# Patient Record
Sex: MALE | Race: WHITE | NOT HISPANIC OR LATINO | Employment: OTHER | URBAN - METROPOLITAN AREA
[De-identification: names, ages, dates, MRNs, and addresses within clinical notes are randomized per-mention and may not be internally consistent; named-entity substitution may affect disease eponyms.]

---

## 2017-01-05 DIAGNOSIS — J45.20 INTERMITTENT ASTHMA, UNCOMPLICATED: Primary | ICD-10-CM

## 2017-01-05 NOTE — TELEPHONE ENCOUNTER
mometasone (ASMANEX 30 METERED DOSES) 220 MCG/INH inhaler       Last Written Prescription Date: 11-23-15  Last Fill Quantity: 3, # refills: 3    Last Office Visit with G, BRAULIO or [facility] prescribing provider:  8-26-16   Future Office Visit:       Date of Last Asthma Action Plan Letter:   Asthma Action Plan Q1 Year    Topic Date Due     Asthma Action Plan - yearly  11/23/2016      Asthma Control Test:   ACT Total Scores 8/26/2016   ACT TOTAL SCORE -   ASTHMA ER VISITS -   ASTHMA HOSPITALIZATIONS -   ACT TOTAL SCORE (Goal Greater than or Equal to 20) 25   In the past 12 months, how many times did you visit the emergency room for your asthma without being admitted to the hospital? 0   In the past 12 months, how many times were you hospitalized overnight because of your asthma? 0       Date of Last Spirometry Test:   No results found for this or any previous visit.

## 2017-02-01 ENCOUNTER — OFFICE VISIT (OUTPATIENT)
Dept: OTOLARYNGOLOGY | Facility: CLINIC | Age: 57
End: 2017-02-01

## 2017-02-01 VITALS
BODY MASS INDEX: 24.64 KG/M2 | WEIGHT: 157 LBS | DIASTOLIC BLOOD PRESSURE: 81 MMHG | HEART RATE: 75 BPM | HEIGHT: 67 IN | OXYGEN SATURATION: 98 % | SYSTOLIC BLOOD PRESSURE: 132 MMHG

## 2017-02-01 DIAGNOSIS — C09.0 MALIGNANT NEOPLASM OF TONSILLAR FOSSA (H): Primary | ICD-10-CM

## 2017-02-01 ASSESSMENT — PAIN SCALES - GENERAL: PAINLEVEL: NO PAIN (0)

## 2017-02-01 NOTE — Clinical Note
2/1/2017        RE: Jose Wise  [address] [id number]     Dear Colleague,    Thank you for referring your patient, Jose Wise, to the [facility] EAR [facility]ROAT at [facility]. Please see a copy of my visit note below.    Dear Dr. Alvarez:    I had the pleasure of seeing Jose Wise in follow-up today at the [facility].     History of Present Illness:   Mr Wise is a 56 year old man with a history of a [id number] SCC of the right tonsil. He was treated with concurrent chemoradiation, completed 3/14/2014. His post-treatment PET showed complete treatment response. He was seen by Dr Romo on 11/8/2016. His TSH was checked in November and was normal. He had a chest and neck CT at that time as well which did not show any evidence of locoregional recurrence. There were several small pulmonary nodules present which will be observed, with plans for repeat imaging in November 2017. I last saw him 11/2016 at which time he was doing well. He comes in today for follow-up. He has stable dry mouth. He is eating well and able to maintain his weight. He has no speaking or swallowing concerns. He has no new neck masses or ear pain. He has stable tinnitus and does have some occasional lightheadedness which he describes as being similar to getting up too quickly. He has a cyst in his right jaw that he had aspirated in August 2016 with interval resolution that is again reaccumulating. He is not smoking.       MEDICATIONS:     Current Outpatient Prescriptions   Medication Sig Dispense Refill     mometasone (ASMANEX 30 METERED DOSES) 220 MCG/INH Inhaler Inhale 1 puff into the lungs daily 3 Inhaler 3     clobetasol (TEMOVATE) 0.05 % external solution Apply topically 2 times daily 50 mL 3     FLUOCINOLONE ACETONIDE SCALP 0.01 % OIL Externally apply 1 Application topically daily 118 mL 3     ketoconazole (NIZORAL) 2 % cream Apply  topically daily To sides of nose and eyebrows for seborrheic dermatitis of the face. 30 g 2     Salicylic Acid 6 % SHAM        ketoconazole (NIZORAL) 2 % shampoo Apply topically three times a week Alternate with H&S/Selsun Blue, and salicylic acid 120 mL 3     metroNIDAZOLE (METROCREAM) 0.75 % cream Apply topically 2 times daily as needed 45 g 11     albuterol (VENTOLIN HFA) 108 (90 BASE) MCG/ACT inhaler Inhale 2 puffs into the lungs 4 times daily as needed 1 Inhaler 11     calcium carbonate (OS-KAVYA 500 MG [place]. [place]) 500 MG tablet Take by mouth 2 times daily       Glucosamine-Chondroitin (GLUCOSAMINE CHONDR COMPLEX PO) Take 1,000 mg by mouth       Omega-3 Fatty Acids (FISH OIL) 1200 MG CAPS        Multiple Vitamin (MULTI-VITAMIN PO) Take  by mouth. Occasionally           ALLERGIES:    Allergies   Allergen Reactions     Animal Dander Itching     Pet hair causes watery eyes and sneezing     Grass Itching     Red tipped grasses, sneezing and watery eyes       HABITS/SOCIAL HISTORY:     Quit smoking years ago    PAST MEDICAL HISTORY:   Past Medical History   Diagnosis Date     Substance abuse      POLYSUBSTANCE     Asthma      Contact with or exposure to other communicable diseases      Spasm of muscle      Lateral epicondylitis  of elbow      Pain in joint, upper arm      Unspecified asthma(493.90)      takes daily inhaler, never been intubated, no attacks  since 2012     Pure hypercholesterolemia      Impaired fasting glucose 9/4/2012     Tension headache, chronic 10/17/2012     Tinea versicolor 12/5/2012     Rosacea 12/5/2012     Impacted cerumen 10/14/2013     Seasonal allergies      Tonsillar cancer (H) 2013     chemo and radiation     History of radiation therapy      Chemical dependency (H)      Hoarseness      Tinnitus         FAMILY HISTORY:    Family History   Problem Relation Age of Onset     GASTROINTESTINAL DISEASE Son      crohn's disease/ colostomy     Circulatory Mother 45     cerebral  "aneurysm     Alcohol/Drug Mother      sibling     Other - See Comments Mother      aneurysm     Substance Abuse Mother      CANCER Mother      Cardiovascular Father      Heart Attack     Coronary Artery Disease Father      heart attack/ triple bypass     CANCER Father      skin cancer     Skin Cancer Father      Other Cancer Brother      upper thorax     Substance Abuse Brother      CANCER Sister      CANCER Brother      Melanoma No family hx of        REVIEW OF SYSTEMS:  12 point ROS was negative other than the symptoms noted above in the HPI.  Patient Supplied Answers to Review of Systems  [facility] ROS 2/1/2017   Constitutional -   Neurology Dizzy spells, Headache   Ears, Nose, Throat Ringing/noise in ears, Nasal congestion or drainage, Trouble swallowing   Gastrointestinal/Genitourinary -   Musculoskeletal -   Allergy/Immunology -   Endocrine -         PHYSICAL EXAMINATION:   /81 mmHg  Pulse 75  Ht 1.7 m (5' 6.93\")  Wt 71.215 kg (157 lb)  BMI 24.64 kg/m2  SpO2 98%  Appearance:   normal; NAD, age-appropriate appearance, well-developed, normal habitus   Communication:   normal; communicates verbally, normal voice quality   Head/Face:   inspection -  Normal; no scars or visible lesions   Salivary glands -  Normal size, no tenderness, swelling, or palpable masses   Facial strength -  Normal and symmetric bilateral; H/B I/VI   Skin:  normal, no rash   Ocular Motility:  normal occular movements   Ears:  auricle (AD) -  normal  EAC (AD) -  normal  TM (AD) -  Normal, no effusion  auricle (AS) -  normal  EAC (AS) -  normal  TM (AS) -  Normal, no effusion  Normal clinical speech reception   Nose:  Ext. inspection -  Normal  Internal Inspection -  Normal mucosa, septum, and turbinates   Nasopharynx:  normal mucosa, no masses   Oral Cavity:  lips -  Normal mucosa, oral competence, and stoma size   Age-appropriate dentition, healthy gingival mucosa   Hard palate, buccal, floor of mouth mucosa normal   Tongue - normal " movement, no lesions, normal sensation, no palpable BOT masses   Oropharynx:  mucosa -  Normal, no lesions  soft palate -  Normal, no lesions, no asymmetry, normal elevation  No palpable masses in BOT or tonsillar fossa   Hypopharynx:  Normal pyriform sinus and pharyngeal wall mucosa   No pooled secretions    Larynx:  Epiglottis, false vocal cord, true vocal cord normal in appearance, bilaterally mobile cords    Neck: No visible mass or asymmetry   thyroid -  Normal   Normal range of motion   Lymphatic:  no abnormal nodes   Cardiovascular: warm, pink, well-perfused extremities without swelling, tenderness, or edema   Respiratory: Normal respiratory effort, no stridor   Neuro/Psych.:  mood/affect -  normal  mental status -  normal  cranial nerves -  normal        PROCEDURES:   Flexible fiberoptic laryngoscopy: Verbal consent was obtained. The nasal cavity was prepped with an aerosolized solution of topical anesthetic and vasoconstrictive agent. The scope was passed through the anterior nasal cavity and advanced. Inspection of the nasopharynx revealed no gross abnormality. Inspection of the larynx revealed bilaterally mobile vocal cords. Pyriform sinuses are symmetric. No intra-arytenoid irregularities noted. The epiglottis, aryepiglottic folds, vallecula and base of tongue are unremarkable. There are radiation changes to the mucosa. The airway is patent. Procedure tolerated well with no immediate complications noted.    IMPRESSION AND PLAN:   Mr Wise has a history of oropharyngeal cancer, almost 2 years from his treatment. He has no evidence of recurrence at this time. We will plan on repeat imaging in a year with his small pulmonary nodules. I offered a hearing test for him today due to the tinnitus but he says that he tests it himself at work. I will see him back for routine follow-up in 3 months.     Thank you very much for the opportunity to participate in the care of your patient.      Yazmin Bundy,  M.D.  Otolaryngology- Head & Neck Surgery      CC:  Janes Romo   of Medicine  Division of Hematology, Oncology, and Transplantation    Valery Carreon MD  Department of Radiation Oncology  [facility]        Again, thank you for allowing me to participate in the care of your patient.      Sincerely,    Yazmin Bundy MD

## 2017-02-02 NOTE — PROGRESS NOTES
Dear Dr. Alvarez:    I had the pleasure of seeing Jose Wise in follow-up today at the [facility].     History of Present Illness:   Mr Wise is a 56 year old man with a history of a [id number] SCC of the right tonsil. He was treated with concurrent chemoradiation, completed 3/14/2014. His post-treatment PET showed complete treatment response. He was seen by Dr Romo on 11/8/2016. His TSH was checked in November and was normal. He had a chest and neck CT at that time as well which did not show any evidence of locoregional recurrence. There were several small pulmonary nodules present which will be observed, with plans for repeat imaging in November 2017. I last saw him 11/2016 at which time he was doing well. He comes in today for follow-up. He has stable dry mouth. He is eating well and able to maintain his weight. He has no speaking or swallowing concerns. He has no new neck masses or ear pain. He has stable tinnitus and does have some occasional lightheadedness which he describes as being similar to getting up too quickly. He has a cyst in his right jaw that he had aspirated in August 2016 with interval resolution that is again reaccumulating. He is not smoking.       MEDICATIONS:     Current Outpatient Prescriptions   Medication Sig Dispense Refill     mometasone (ASMANEX 30 METERED DOSES) 220 MCG/INH Inhaler Inhale 1 puff into the lungs daily 3 Inhaler 3     clobetasol (TEMOVATE) 0.05 % external solution Apply topically 2 times daily 50 mL 3     FLUOCINOLONE ACETONIDE SCALP 0.01 % OIL Externally apply 1 Application topically daily 118 mL 3     ketoconazole (NIZORAL) 2 % cream Apply topically daily To sides of nose and eyebrows for seborrheic dermatitis of the face. 30 g 2     Salicylic Acid 6 % SHAM        ketoconazole (NIZORAL) 2 % shampoo Apply topically three times a week Alternate with H&S/Selsun Blue, and salicylic acid 120 mL 3     metroNIDAZOLE (METROCREAM) 0.75 %  cream Apply topically 2 times daily as needed 45 g 11     albuterol (VENTOLIN HFA) 108 (90 BASE) MCG/ACT inhaler Inhale 2 puffs into the lungs 4 times daily as needed 1 Inhaler 11     calcium carbonate (OS-KAVYA 500 MG [place]. [place]) 500 MG tablet Take by mouth 2 times daily       Glucosamine-Chondroitin (GLUCOSAMINE CHONDR COMPLEX PO) Take 1,000 mg by mouth       Omega-3 Fatty Acids (FISH OIL) 1200 MG CAPS        Multiple Vitamin (MULTI-VITAMIN PO) Take  by mouth. Occasionally           ALLERGIES:    Allergies   Allergen Reactions     Animal Dander Itching     Pet hair causes watery eyes and sneezing     Grass Itching     Red tipped grasses, sneezing and watery eyes       HABITS/SOCIAL HISTORY:     Quit smoking years ago    PAST MEDICAL HISTORY:   Past Medical History   Diagnosis Date     Substance abuse      POLYSUBSTANCE     Asthma      Contact with or exposure to other communicable diseases      Spasm of muscle      Lateral epicondylitis  of elbow      Pain in joint, upper arm      Unspecified asthma(493.90)      takes daily inhaler, never been intubated, no attacks  since 2012     Pure hypercholesterolemia      Impaired fasting glucose 9/4/2012     Tension headache, chronic 10/17/2012     Tinea versicolor 12/5/2012     Rosacea 12/5/2012     Impacted cerumen 10/14/2013     Seasonal allergies      Tonsillar cancer (H) 2013     chemo and radiation     History of radiation therapy      Chemical dependency (H)      Hoarseness      Tinnitus         FAMILY HISTORY:    Family History   Problem Relation Age of Onset     GASTROINTESTINAL DISEASE Son      crohn's disease/ colostomy     Circulatory Mother 45     cerebral aneurysm     Alcohol/Drug Mother      sibling     Other - See Comments Mother      aneurysm     Substance Abuse Mother      CANCER Mother      Cardiovascular Father      Heart Attack     Coronary Artery Disease Father      heart attack/ triple bypass     CANCER Father      skin cancer     Skin  "Cancer Father      Other Cancer Brother      upper thorax     Substance Abuse Brother      CANCER Sister      CANCER Brother      Melanoma No family hx of        REVIEW OF SYSTEMS:  12 point ROS was negative other than the symptoms noted above in the HPI.  Patient Supplied Answers to Review of Systems  [facility] ROS 2/1/2017   Constitutional -   Neurology Dizzy spells, Headache   Ears, Nose, Throat Ringing/noise in ears, Nasal congestion or drainage, Trouble swallowing   Gastrointestinal/Genitourinary -   Musculoskeletal -   Allergy/Immunology -   Endocrine -         PHYSICAL EXAMINATION:   /81 mmHg  Pulse 75  Ht 1.7 m (5' 6.93\")  Wt 71.215 kg (157 lb)  BMI 24.64 kg/m2  SpO2 98%  Appearance:   normal; NAD, age-appropriate appearance, well-developed, normal habitus   Communication:   normal; communicates verbally, normal voice quality   Head/Face:   inspection -  Normal; no scars or visible lesions   Salivary glands -  Normal size, no tenderness, swelling, or palpable masses   Facial strength -  Normal and symmetric bilateral; H/B I/VI   Skin:  normal, no rash   Ocular Motility:  normal occular movements   Ears:  auricle (AD) -  normal  EAC (AD) -  normal  TM (AD) -  Normal, no effusion  auricle (AS) -  normal  EAC (AS) -  normal  TM (AS) -  Normal, no effusion  Normal clinical speech reception   Nose:  Ext. inspection -  Normal  Internal Inspection -  Normal mucosa, septum, and turbinates   Nasopharynx:  normal mucosa, no masses   Oral Cavity:  lips -  Normal mucosa, oral competence, and stoma size   Age-appropriate dentition, healthy gingival mucosa   Hard palate, buccal, floor of mouth mucosa normal   Tongue - normal movement, no lesions, normal sensation, no palpable BOT masses   Oropharynx:  mucosa -  Normal, no lesions  soft palate -  Normal, no lesions, no asymmetry, normal elevation  No palpable masses in BOT or tonsillar fossa   Hypopharynx:  Normal pyriform sinus and pharyngeal wall mucosa   No " pooled secretions    Larynx:  Epiglottis, false vocal cord, true vocal cord normal in appearance, bilaterally mobile cords    Neck: No visible mass or asymmetry   thyroid -  Normal   Normal range of motion   Lymphatic:  no abnormal nodes   Cardiovascular: warm, pink, well-perfused extremities without swelling, tenderness, or edema   Respiratory: Normal respiratory effort, no stridor   Neuro/Psych.:  mood/affect -  normal  mental status -  normal  cranial nerves -  normal        PROCEDURES:   Flexible fiberoptic laryngoscopy: Verbal consent was obtained. The nasal cavity was prepped with an aerosolized solution of topical anesthetic and vasoconstrictive agent. The scope was passed through the anterior nasal cavity and advanced. Inspection of the nasopharynx revealed no gross abnormality. Inspection of the larynx revealed bilaterally mobile vocal cords. Pyriform sinuses are symmetric. No intra-arytenoid irregularities noted. The epiglottis, aryepiglottic folds, vallecula and base of tongue are unremarkable. There are radiation changes to the mucosa. The airway is patent. Procedure tolerated well with no immediate complications noted.    IMPRESSION AND PLAN:   Mr Wise has a history of oropharyngeal cancer, almost 2 years from his treatment. He has no evidence of recurrence at this time. We will plan on repeat imaging in a year with his small pulmonary nodules. I offered a hearing test for him today due to the tinnitus but he says that he tests it himself at work. I will see him back for routine follow-up in 3 months.     Thank you very much for the opportunity to participate in the care of your patient.      Yazmin Bundy M.D.  Otolaryngology- Head & Neck Surgery      CC:  Janes Romo   of Medicine  Division of Hematology, Oncology, and Transplantation    Valery Carreon MD  Department of Radiation Oncology  [facility]

## 2017-03-02 ENCOUNTER — MYC MEDICAL ADVICE (OUTPATIENT)
Dept: FAMILY MEDICINE | Facility: CLINIC | Age: 57
End: 2017-03-02

## 2017-03-28 ENCOUNTER — OFFICE VISIT (OUTPATIENT)
Dept: DERMATOLOGY | Facility: CLINIC | Age: 57
End: 2017-03-28

## 2017-03-28 DIAGNOSIS — L71.9 ACNE ROSACEA: ICD-10-CM

## 2017-03-28 DIAGNOSIS — L40.8 SEBOPSORIASIS: Primary | ICD-10-CM

## 2017-03-28 DIAGNOSIS — I78.1 TELANGIECTASIA: ICD-10-CM

## 2017-03-28 ASSESSMENT — PAIN SCALES - GENERAL: PAINLEVEL: NO PAIN (0)

## 2017-03-28 NOTE — MR AVS SNAPSHOT
After Visit Summary   3/28/2017    Jose Wise    MRN: [id number]           Patient Information     Date Of Birth          1960        Visit Information        Provider Department      3/28/2017 4:30 PM Brandon Frausto MD [facility] Dermatology        Today's Diagnoses     Sebopsoriasis    -  1       Follow-ups after your visit        Follow-up notes from your care team     Return in about 9 months (around 12/28/2017).      Your next 10 appointments already scheduled     May 03, 2017  4:00 PM CDT   (Arrive by 3:45 PM)   Return Visit with Yazmin Bundy MD   [facility] Ear Nose and Throat ([facility])    [address]  4th Floor  [facility] [address]   [phone]            May 16, 2017  3:45 PM CDT   [facility] Draw with [facility] DRAW   [facility] Lab Draw ([facility])    [address] [place] [address]  [address] [facility] [address]   [phone]            May 16, 2017  4:20 PM CDT   (Arrive by 4:05 PM)   Return Visit with KENTON Mak   [facility] ([facility])    [address] [place] [address]  [address] [facility] [address]   [phone]            Nov 14, 2017 12:00 PM CST   (Arrive by 11:45 AM)   CT CHEST W CONTRAST with UCCT1   [facility] Imaging [place] [facility] ([facility])    [address] [place] [address]  1st Floor  [facility] [address]   [phone]           Please bring any scans or X-rays taken at other hospitals, if similar tests were done. Also bring a list of your medicines, including vitamins, minerals and over-the-counter drugs. It is safest to leave personal items at home.  Be sure to tell your doctor:   If you have any allergies.   If there s any chance you are pregnant.   If you are breastfeeding.   If you have any special needs.  You will have contrast for this exam. To prepare:   Do not eat or drink for 2 hours before your  exam. If you need to take medicine, you may take it with small sips of water. (We may ask you to take liquid medicine as well.)   The day before your exam, drink extra fluids at least six 8-ounce glasses (unless your doctor tells you to restrict your fluids).  Patients over 70 or patients with diabetes or kidney problems:   If you haven t had a blood test (creatinine test) within the last 30 days, go to your clinic or Diagnostic Imaging Department for this test.  If you have diabetes:   If your kidney function is normal, continue taking your metformin (Avandamet, Glucophage, Glucovance, Metaglip) on the day of your exam.   If your kidney function is abnormal, wait 48 hours before restarting this medicine.  Please wear loose clothing, such as a sweat suit or jogging clothes. Avoid snaps, zippers and other metal. We may ask you to undress and put on a hospital gown.  If you have any questions, please call the Imaging Department where you will have your exam.            Nov 14, 2017 12:20 PM CST   (Arrive by 12:05 PM)   CT SOFT TISSUE NECK W CONTRAST with UCCT1   [facility] Imaging Center CT ([facility])    [address] [address] [phone]           Please bring any scans or X-rays taken at other hospitals, if similar tests were done. Also bring a list of your medicines, including vitamins, minerals and over-the-counter drugs. It is safest to leave personal items at home.  Be sure to tell your doctor:   If you have any allergies.   If there s any chance you are pregnant.   If you are breastfeeding.   If you have any special needs.  You will have contrast for this exam. To prepare:   Do not eat or drink for 2 hours before your exam. If you need to take medicine, you may take it with small sips of water. (We may ask you to take liquid medicine as well.)   The day before your exam, drink extra fluids at least six 8-ounce glasses (unless your doctor tells you  to restrict your fluids).  Patients over 70 or patients with diabetes or kidney problems:   If you haven t had a blood test (creatinine test) within the last 30 days, go to your clinic or Diagnostic Imaging Department for this test.  If you have diabetes:   If your kidney function is normal, continue taking your metformin (Avandamet, Glucophage, Glucovance, Metaglip) on the day of your exam.   If your kidney function is abnormal, wait 48 hours before restarting this medicine.  Please wear loose clothing, such as a sweat suit or jogging clothes. Avoid snaps, zippers and other metal. We may ask you to undress and put on a hospital gown.  If you have any questions, please call the Imaging Department where you will have your exam.            Nov 14, 2017  3:00 PM CST   (Arrive by 2:45 PM)   Return Visit with Janes Romo DO   [facility] ([facility])    [address] [place] [address] [phone]              Who to contact     Please call your clinic at [phone] to:    Ask questions about your health    Make or cancel appointments    Discuss your medicines    Learn about your test results    Speak to your doctor   If you have compliments or concerns about an experience at your clinic, or if you wish to file a complaint, please contact [facility] Patient Relations at [phone] or email us at [email or web address]         Additional Information About Your Visit        [employer] Information     [employer] gives you secure access to your electronic health record. If you see a primary care provider, you can also send messages to your care team and make appointments. If you have questions, please call your primary care clinic.  If you do not have a primary care provider, please call [phone] and they will assist you.      [employer] is an electronic gateway that provides easy, online access to your  medical records. With [employer], you can request a clinic appointment, read your test results, renew a prescription or communicate with your care team.     To access your existing account, please contact your [facility] or call [phone] for assistance.        Care EveryWhere ID     This is your Care EveryWhere ID. This could be used by other organizations to access your [facility] medical records  [id number]         Blood Pressure from Last 3 Encounters:   No data found for BP    Weight from Last 3 Encounters:   No data found for Wt              Today, you had the following     No orders found for display         Today's Medication Changes          These changes are accurate as of: 3/28/17 11:59 PM.  If you have any questions, ask your nurse or doctor.               Stop taking these medicines if you haven't already. Please contact your care team if you have questions.     FLUOCINOLONE ACETONIDE SCALP 0.01 % Oil oil   Stopped by:  Brandon Frausto MD           Salicylic Acid 6 % Sham   Stopped by:  Brandon Frausto MD                    Primary Care Provider Office Phone # Fax #    Nicolás Morejon [phone]       [address] [address]        Thank you!     Thank you for choosing [facility] DERMATOLOGY  for your care. Our goal is always to provide you with excellent care. Hearing back from our patients is one way we can continue to improve our services. Please take a few minutes to complete the written survey that you may receive in the mail after your visit with us. Thank you!             Your Updated Medication List - Protect others around you: Learn how to safely use, store and throw away your medicines at www.disposemymeds.org.          This list is accurate as of: 3/28/17 11:59 PM.  Always use your most recent med list.                   Brand Name Dispense Instructions for use    albuterol 108 (90 BASE)  MCG/ACT Inhaler    VENTOLIN HFA    1 Inhaler    Inhale 2 puffs into the lungs 4 times daily as needed       calcium carbonate 500 MG tablet    OS-KAVYA 500 mg [place]. [place]     Take by mouth 2 times daily       clobetasol 0.05 % external solution    TEMOVATE    50 mL    Apply topically 2 times daily       Fish Oil 1200 MG Caps          GLUCOSAMINE CHONDR COMPLEX PO      Take 1,000 mg by mouth       * ketoconazole 2 % shampoo    NIZORAL    120 mL    Apply topically three times a week Alternate with H&S/Selsun Blue, and salicylic acid       * ketoconazole 2 % cream    NIZORAL    30 g    Apply topically daily To sides of nose and eyebrows for seborrheic dermatitis of the face.       metroNIDAZOLE 0.75 % cream    METROCREAM    45 g    Apply topically 2 times daily as needed       mometasone 220 MCG/INH Inhaler    ASMANEX 30 METERED DOSES    3 Inhaler    Inhale 1 puff into the lungs daily       MULTI-VITAMIN PO      Take  by mouth. Occasionally       * Notice:  This list has 2 medication(s) that are the same as other medications prescribed for you. Read the directions carefully, and ask your doctor or other care provider to review them with you.

## 2017-03-28 NOTE — PROGRESS NOTES
"[facility] Dermatology Note    Dermatology Problem List:  1. Sebopsoriasis   - s/p fluocinolone 0.01% oil  - alternate ketoconazole 2% shampoo, salicylic acid shampoo and head & shoulders shampoos, clobetasol 0.05% solution  2. Seborrheic dermatitis - ketoconazole 2% cream  3. Rosacea - metronidazole cream    CC:   Chief Complaint   Patient presents with     Derm Problem     Jose is here today for a 3 month follow up for his \"scalp and sebopsoriasis\" States it's \"better.\"       Date of Service: Mar 28, 2017    History of Present Illness:  Mr. Jose Wise is a 56 year old male who presents for a follow-up for seborrheic dermatitis, sebopsoriasis and rosacea. Today the patient reports that the scalp is doing great and it is feeling good. He does not use the oil on his scalp, but he is using the solution. He uses the solution when he remembers. He reports that there is a red spot on his nose which has not gone away and is still red. He also reports that awhile ago a site was drained on his right chin area and there still is a little \"bulge\" at the site, but this does not produce any symptoms. The patient reports no other lesions of concern at this time.     Otherwise, the patient reports no painful, bleeding, nonhealing, or pruritic lesions, and denies new or changing moles.    Past Medical History:   Patient Active Problem List   Diagnosis     Trigger finger, left long     CARDIOVASCULAR SCREENING; LDL GOAL LESS THAN 130     Impaired fasting glucose     Tension headache, chronic     Tinea versicolor     Rosacea     MALIG NEOPLASM TONSILLAR FOSSA     Counseling regarding advanced directives     Intermittent asthma, uncomplicated     Dermatitis, seborrheic     Past Medical History:   Diagnosis Date     Asthma      Chemical dependency (H)      Contact with or exposure to other communicable diseases      History of radiation therapy      Hoarseness      Impacted cerumen 10/14/2013     Impaired " fasting glucose 9/4/2012     Lateral epicondylitis  of elbow      Pain in joint, upper arm      Pure hypercholesterolemia      Rosacea 12/5/2012     Seasonal allergies      Spasm of muscle      Substance abuse     POLYSUBSTANCE     Tension headache, chronic 10/17/2012     Tinea versicolor 12/5/2012     Tinnitus      Tonsillar cancer (H) 2013    chemo and radiation     Unspecified asthma(493.90)     takes daily inhaler, never been intubated, no attacks  since 2012     Past Surgical History:   Procedure Laterality Date     ABDOMEN SURGERY  january 2014    peg and port     BIOPSY  October 2013    fine needle aspiration in my neck     C HAND/FINGER SURGERY UNLISTED  12/13/10    Lt middle TF relese     COLONOSCOPY  1-17-11    Repeat 10 years     HERNIA REPAIR  1960    as an infant     INSERT PORT VASCULAR ACCESS  1/22/2014    Procedure: INSERT PORT VASCULAR ACCESS;  Insertion Vascular Port Access, Percutaneous Insertion Gastrostomy Tube ;  Surgeon: Adebayo Alston MD;  Location: [facility] OR     LARYNGOSCOPY, ESOPHAGOSCOPY,  BIOPSY, COMBINED  1/15/2014    Procedure: COMBINED LARYNGOSCOPY, ESOPHAGOSCOPY,  BIOPSY;  Direct Laryngoscopy, Esophagoscopy, Biopsies ;  Surgeon: Jayashree Alvarez MD;  Location: [facility] OR     ORTHOPEDIC SURGERY      fracture repair arm, shoulder     SOFT TISSUE SURGERY      cyst removal     Social History:  The patient is .    Family History:  There is a family history of melanoma in the patient's father.    Medications:  Current Outpatient Prescriptions   Medication Sig Dispense Refill     mometasone (ASMANEX 30 METERED DOSES) 220 MCG/INH Inhaler Inhale 1 puff into the lungs daily 3 Inhaler 3     clobetasol (TEMOVATE) 0.05 % external solution Apply topically 2 times daily 50 mL 3     FLUOCINOLONE ACETONIDE SCALP 0.01 % OIL Externally apply 1 Application topically daily 118 mL 3     ketoconazole (NIZORAL) 2 % cream Apply topically daily To sides of nose and eyebrows for seborrheic  dermatitis of the face. 30 g 2     Salicylic Acid 6 % SHAM        ketoconazole (NIZORAL) 2 % shampoo Apply topically three times a week Alternate with H&S/Selsun Blue, and salicylic acid 120 mL 3     metroNIDAZOLE (METROCREAM) 0.75 % cream Apply topically 2 times daily as needed 45 g 11     calcium carbonate (OS-KAVYA 500 MG [place]. [place]) 500 MG tablet Take by mouth 2 times daily       Glucosamine-Chondroitin (GLUCOSAMINE CHONDR COMPLEX PO) Take 1,000 mg by mouth       Omega-3 Fatty Acids (FISH OIL) 1200 MG CAPS        Multiple Vitamin (MULTI-VITAMIN PO) Take  by mouth. Occasionally         albuterol (VENTOLIN HFA) 108 (90 BASE) MCG/ACT inhaler Inhale 2 puffs into the lungs 4 times daily as needed (Patient not taking: Reported on 3/28/2017) 1 Inhaler 11     Allergies:  Allergies   Allergen Reactions     Animal Dander Itching     Pet hair causes watery eyes and sneezing     Grass Itching     Red tipped grasses, sneezing and watery eyes     Review of Systems:  - A six point ROS was negative except as noted in HPI.     Physical exam:  Vitals: There were no vitals taken for this visit.  GEN: This is a well-developed, well-nourished male in no acute distress, in a pleasant mood.      SKIN: Sun-exposed skin, which includes the head/face, neck, both arms, digits, and/or nails was examined.   - Light pinkness on temporal scalp remains  - Top of the scalp and occiput is healthy  - Interval resolution of pink patches on scalp  - Prominent telangiectasias on cheeks and nose  - No inflammatory papules, conjunctivitis  - Pink vascular patch on right nasal dorsum and left nasal ala, eliminated with diascopy   - Well healed scar from prior procedures on the right jaw line with ill defined firm subcutaneous nodule superolateral to the scar  - Superficial yellowish papules on inferior eyelid  - No other lesions of concern on areas examined.     Impression/Plan:  1. Sebopsoriasis  - Continue washing scalp daily, alternating between  ketoconazole 2% shampoo, salicylic acid shampoo and head & shoulders shampoos as needed. Stop salicylic acid shampoo first.   - Stop fluocinolone 0.01% oil - can restart this if needed.   - Continue clobetasol 0.05% solution - apply to scalp as needed  - Discussion to slowly decrease the use of the topical medications until the patient is using the least amount of medication to suppress the symptoms.     2. Seborrheic dermatitis - resolved  - Continue ketoconazole 2% cream - apply daily to eyebrows as needed    3. Rosacea  - Continue metronidazole cream - apply BID on the upper cheeks and eyebrows    4. Telangiectasias - nasal tip, left nasal ala  - No further intervention required. Patient to report changes.     5. Site of prior EIC injected with kenalog, unchanged to my examination and without clinical evidence of residual cyst  - No further intervention required. Patient to report changes.   - If this site produces any further symptoms, consider biopsy.     6. [place] spots - face  - No further intervention required. Patient to report changes.     Follow-up in 9 months, earlier for new or changing lesions.    Staff Involved:  Scribe Disclosure:   I, Ugo Wilkinson, am serving as a scribe to document services personally performed by Dr. Brandon Frausto, based on data collection and the provider's statements to me.     Staff attestation:  The documentation recorded by the scribe accurately reflects the services I personally performed and the decisions I personally made.    Brandon Frausto MD  Staff Dermatologist    Department of Dermatology

## 2017-03-28 NOTE — NURSING NOTE
"Dermatology Rooming Note    Jose Wise's goals for this visit include:   Chief Complaint   Patient presents with     Derm Problem     Jose is here today for a 3 month follow up for his \"scalp and sebopsoriasis\" States it's \"better.\"         Stephani Ayala, APPLE    "

## 2017-03-28 NOTE — LETTER
"3/28/2017       RE: Jose Wise  [address] [address]     Dear Colleague,    Thank you for referring your patient, Jose Wise, to the [facility] DERMATOLOGY at [facility]. Please see a copy of my visit note below.    [facility] Dermatology Note    Dermatology Problem List:  1. Sebopsoriasis   - s/p fluocinolone 0.01% oil  - alternate ketoconazole 2% shampoo, salicylic acid shampoo and head & shoulders shampoos, clobetasol 0.05% solution  2. Seborrheic dermatitis - ketoconazole 2% cream  3. Rosacea - metronidazole cream    CC:   Chief Complaint   Patient presents with     Derm Problem     Jose is here today for a 3 month follow up for his \"scalp and sebopsoriasis\" States it's \"better.\"       Date of Service: Mar 28, 2017    History of Present Illness:  Mr. Jose Wise is a 56 year old male who presents for a follow-up for seborrheic dermatitis, sebopsoriasis and rosacea. Today the patient reports that the scalp is doing great and it is feeling good. He does not use the oil on his scalp, but he is using the solution. He uses the solution when he remembers. He reports that there is a red spot on his nose which has not gone away and is still red. He also reports that awhile ago a site was drained on his right chin area and there still is a little \"bulge\" at the site, but this does not produce any symptoms. The patient reports no other lesions of concern at this time.     Otherwise, the patient reports no painful, bleeding, nonhealing, or pruritic lesions, and denies new or changing moles.    Past Medical History:   Patient Active Problem List   Diagnosis     Trigger finger, left long     CARDIOVASCULAR SCREENING; LDL GOAL LESS THAN 130     Impaired fasting glucose     Tension headache, chronic     Tinea versicolor     Rosacea     MALIG NEOPLASM TONSILLAR FOSSA     Counseling regarding advanced directives     Intermittent " asthma, uncomplicated     Dermatitis, seborrheic     Past Medical History:   Diagnosis Date     Asthma      Chemical dependency (H)      Contact with or exposure to other communicable diseases      History of radiation therapy      Hoarseness      Impacted cerumen 10/14/2013     Impaired fasting glucose 9/4/2012     Lateral epicondylitis  of elbow      Pain in joint, upper arm      Pure hypercholesterolemia      Rosacea 12/5/2012     Seasonal allergies      Spasm of muscle      Substance abuse     POLYSUBSTANCE     Tension headache, chronic 10/17/2012     Tinea versicolor 12/5/2012     Tinnitus      Tonsillar cancer (H) 2013    chemo and radiation     Unspecified asthma(493.90)     takes daily inhaler, never been intubated, no attacks  since 2012     Past Surgical History:   Procedure Laterality Date     ABDOMEN SURGERY  january 2014    peg and port     BIOPSY  October 2013    fine needle aspiration in my neck     C HAND/FINGER SURGERY UNLISTED  12/13/10    Lt middle TF relese     COLONOSCOPY  1-17-11    Repeat 10 years     HERNIA REPAIR  1960    as an infant     INSERT PORT VASCULAR ACCESS  1/22/2014    Procedure: INSERT PORT VASCULAR ACCESS;  Insertion Vascular Port Access, Percutaneous Insertion Gastrostomy Tube ;  Surgeon: Adebayo Alston MD;  Location: [facility] OR     LARYNGOSCOPY, ESOPHAGOSCOPY,  BIOPSY, COMBINED  1/15/2014    Procedure: COMBINED LARYNGOSCOPY, ESOPHAGOSCOPY,  BIOPSY;  Direct Laryngoscopy, Esophagoscopy, Biopsies ;  Surgeon: Jayashree Alvarez MD;  Location: [facility] OR     ORTHOPEDIC SURGERY      fracture repair arm, shoulder     SOFT TISSUE SURGERY      cyst removal     Social History:  The patient is .    Family History:  There is a family history of melanoma in the patient's father.    Medications:  Current Outpatient Prescriptions   Medication Sig Dispense Refill     mometasone (ASMANEX 30 METERED DOSES) 220 MCG/INH Inhaler Inhale 1 puff into the lungs daily 3 Inhaler 3      clobetasol (TEMOVATE) 0.05 % external solution Apply topically 2 times daily 50 mL 3     FLUOCINOLONE ACETONIDE SCALP 0.01 % OIL Externally apply 1 Application topically daily 118 mL 3     ketoconazole (NIZORAL) 2 % cream Apply topically daily To sides of nose and eyebrows for seborrheic dermatitis of the face. 30 g 2     Salicylic Acid 6 % SHAM        ketoconazole (NIZORAL) 2 % shampoo Apply topically three times a week Alternate with H&S/Selsun Blue, and salicylic acid 120 mL 3     metroNIDAZOLE (METROCREAM) 0.75 % cream Apply topically 2 times daily as needed 45 g 11     calcium carbonate (OS-KAVYA 500 MG [place]. [place]) 500 MG tablet Take by mouth 2 times daily       Glucosamine-Chondroitin (GLUCOSAMINE CHONDR COMPLEX PO) Take 1,000 mg by mouth       Omega-3 Fatty Acids (FISH OIL) 1200 MG CAPS        Multiple Vitamin (MULTI-VITAMIN PO) Take  by mouth. Occasionally         albuterol (VENTOLIN HFA) 108 (90 BASE) MCG/ACT inhaler Inhale 2 puffs into the lungs 4 times daily as needed (Patient not taking: Reported on 3/28/2017) 1 Inhaler 11     Allergies:  Allergies   Allergen Reactions     Animal Dander Itching     Pet hair causes watery eyes and sneezing     Grass Itching     Red tipped grasses, sneezing and watery eyes     Review of Systems:  - A six point ROS was negative except as noted in HPI.     Physical exam:  Vitals: There were no vitals taken for this visit.  GEN: This is a well-developed, well-nourished male in no acute distress, in a pleasant mood.      SKIN: Sun-exposed skin, which includes the head/face, neck, both arms, digits, and/or nails was examined.   - Light pinkness on temporal scalp remains  - Top of the scalp and occiput is healthy  - Interval resolution of pink patches on scalp  - Prominent telangiectasias on cheeks and nose  - No inflammatory papules, conjunctivitis  - Pink vascular patch on right nasal dorsum and left nasal ala, eliminated with diascopy   - Well healed scar from prior procedures on  the right jaw line with ill defined firm subcutaneous nodule superolateral to the scar  - Superficial yellowish papules on inferior eyelid  - No other lesions of concern on areas examined.     Impression/Plan:  1. Sebopsoriasis  - Continue washing scalp daily, alternating between ketoconazole 2% shampoo, salicylic acid shampoo and head & shoulders shampoos as needed. Stop salicylic acid shampoo first.   - Stop fluocinolone 0.01% oil - can restart this if needed.   - Continue clobetasol 0.05% solution - apply to scalp as needed  - Discussion to slowly decrease the use of the topical medications until the patient is using the least amount of medication to suppress the symptoms.     2. Seborrheic dermatitis - resolved  - Continue ketoconazole 2% cream - apply daily to eyebrows as needed    3. Rosacea  - Continue metronidazole cream - apply BID on the upper cheeks and eyebrows    4. Telangiectasias - nasal tip, left nasal ala  - No further intervention required. Patient to report changes.     5. Site of prior EIC injected with kenalog, unchanged to my examination and without clinical evidence of residual cyst  - No further intervention required. Patient to report changes.   - If this site produces any symptoms, consider biopsy.     6. [place] spots - face  - No further intervention required. Patient to report changes.       Follow-up in 9 months, earlier for new or changing lesions.    Staff Involved:  Scribe Disclosure:   MARITZA, Ugo Wilkinson, am serving as a scribe to document services personally performed by Dr. Brandon Frausto, based on data collection and the provider's statements to me.         Again, thank you for allowing me to participate in the care of your patient.      Sincerely,    Brandon Frausto MD

## 2017-05-03 ENCOUNTER — OFFICE VISIT (OUTPATIENT)
Dept: OTOLARYNGOLOGY | Facility: CLINIC | Age: 57
End: 2017-05-03

## 2017-05-03 VITALS — WEIGHT: 159 LBS | HEIGHT: 67 IN | BODY MASS INDEX: 24.96 KG/M2

## 2017-05-03 DIAGNOSIS — K21.9 GASTROESOPHAGEAL REFLUX DISEASE WITHOUT ESOPHAGITIS: Primary | ICD-10-CM

## 2017-05-03 DIAGNOSIS — C09.0 MALIGNANT NEOPLASM OF TONSILLAR FOSSA (H): ICD-10-CM

## 2017-05-03 ASSESSMENT — PAIN SCALES - GENERAL: PAINLEVEL: NO PAIN (0)

## 2017-05-03 NOTE — PATIENT INSTRUCTIONS
Please follow up to see Dr Bundy in 4 months. For questions or concerns please call the RN care coordinator.   Ector Major RN  [phone]

## 2017-05-03 NOTE — PROGRESS NOTES
Dear Dr. Alvarez:    I had the pleasure of seeing Jose Wise in follow-up today at the [facility] Otolaryngology [facility].     History of Present Illness:   Mr Wise is a 56 year old man with a history of a [id number] SCC of the right tonsil. He was treated with concurrent chemoradiation, completed 3/14/2014. His post-treatment PET showed complete treatment response. His last imaging in November 2016 did show several small pulmonary nodules, with the plan for repeat imaging in November 2017. His last TSH was in 11/2016 and was normal. He was last seen in my clinic 2/2017, at which time he was doing well. He comes in today with his only complaint being recently having nightly heartburn. He intermittently takes Rolaids for this. His swallowing is stable, with needs for liquids while eating to help facilitate swallowing. He says he lost his swallowing exercises instructions and can't remember how to do them. He denies any sore throat, odynophagia, otalgia, neck masses, hemoptysis. His weight is stable.         MEDICATIONS:     Current Outpatient Prescriptions   Medication Sig Dispense Refill     ranitidine (ZANTAC) 300 MG tablet Take 1 tablet (300 mg) by mouth At Bedtime 30 tablet 5     mometasone (ASMANEX 30 METERED DOSES) 220 MCG/INH Inhaler Inhale 1 puff into the lungs daily 3 Inhaler 3     clobetasol (TEMOVATE) 0.05 % external solution Apply topically 2 times daily 50 mL 3     ketoconazole (NIZORAL) 2 % cream Apply topically daily To sides of nose and eyebrows for seborrheic dermatitis of the face. 30 g 2     ketoconazole (NIZORAL) 2 % shampoo Apply topically three times a week Alternate with H&S/Selsun Blue, and salicylic acid 120 mL 3     metroNIDAZOLE (METROCREAM) 0.75 % cream Apply topically 2 times daily as needed 45 g 11     albuterol (VENTOLIN HFA) 108 (90 BASE) MCG/ACT inhaler Inhale 2 puffs into the lungs 4 times daily as needed 1 Inhaler 11     calcium carbonate (OS-KAVYA 500 MG [place]. [place])  500 MG tablet Take by mouth 2 times daily       Glucosamine-Chondroitin (GLUCOSAMINE CHONDR COMPLEX PO) Take 1,000 mg by mouth       Omega-3 Fatty Acids (FISH OIL) 1200 MG CAPS        Multiple Vitamin (MULTI-VITAMIN PO) Take  by mouth. Occasionally           ALLERGIES:    Allergies   Allergen Reactions     Animal Dander Itching     Pet hair causes watery eyes and sneezing     Grass Itching     Red tipped grasses, sneezing and watery eyes       HABITS/SOCIAL HISTORY:     Quit smoking years ago    PAST MEDICAL HISTORY:   Past Medical History:   Diagnosis Date     Asthma      Chemical dependency (H)      Contact with or exposure to other communicable diseases      History of radiation therapy      Hoarseness      Impacted cerumen 10/14/2013     Impaired fasting glucose 9/4/2012     Lateral epicondylitis  of elbow      Pain in joint, upper arm      Pure hypercholesterolemia      Rosacea 12/5/2012     Seasonal allergies      Spasm of muscle      Substance abuse     POLYSUBSTANCE     Tension headache, chronic 10/17/2012     Tinea versicolor 12/5/2012     Tinnitus      Tonsillar cancer (H) 2013    chemo and radiation     Unspecified asthma(493.90)     takes daily inhaler, never been intubated, no attacks  since 2012        FAMILY HISTORY:    Family History   Problem Relation Age of Onset     Circulatory Mother 45     cerebral aneurysm     Alcohol/Drug Mother      sibling     Other - See Comments Mother      aneurysm     Substance Abuse Mother      CANCER Mother      Cardiovascular Father      Heart Attack     Coronary Artery Disease Father      heart attack/ triple bypass     CANCER Father      skin cancer     Skin Cancer Father      Other Cancer Brother      upper thorax     Substance Abuse Brother      CANCER Sister      CANCER Brother      GASTROINTESTINAL DISEASE Son      crohn's disease/ colostomy     Melanoma No family hx of        REVIEW OF SYSTEMS:  12 point ROS was negative other than the  "symptoms noted above in the HPI.  Patient Supplied Answers to Review of Systems   ENT ROS 4/30/2017   Constitutional -   Neurology Dizzy spells, Headache   Ears, Nose, Throat Ringing/noise in ears, Trouble swallowing   Gastrointestinal/Genitourinary Heartburn/indigestion   Musculoskeletal -   Allergy/Immunology -   Endocrine -         PHYSICAL EXAMINATION:   Ht 1.7 m (5' 6.93\")  Wt 72.1 kg (159 lb)  BMI 24.96 kg/m2  Appearance:   normal; NAD, age-appropriate appearance, well-developed, normal habitus   Communication:   normal; communicates verbally, normal voice quality   Head/Face:   inspection -  Normal; no scars or visible lesions   Salivary glands -  Normal size, no tenderness, swelling, or palpable masses   Facial strength -  Normal and symmetric bilateral; H/B I/VI   Skin:  normal, no rash   Ocular Motility:  normal occular movements   Ears:  auricle (AD) -  normal  EAC (AD) -  normal  TM (AD) -  Normal, no effusion  auricle (AS) -  normal  EAC (AS) -  normal  TM (AS) -  Normal, no effusion  Normal clinical speech reception   Nose:  Ext. inspection -  Normal  Internal Inspection -  Normal mucosa, septum, and turbinates   Nasopharynx:  normal mucosa, no masses   Oral Cavity:  lips -  Normal mucosa, oral competence, and stoma size   Age-appropriate dentition, healthy gingival mucosa   Hard palate, buccal, floor of mouth mucosa normal   Tongue - normal movement, no lesions, normal sensation, no palpable BOT masses   Oropharynx:  mucosa -  Normal, no lesions  soft palate -  Normal, no lesions, no asymmetry, normal elevation  No palpable masses in BOT or tonsillar fossa   Hypopharynx:  Normal pyriform sinus and pharyngeal wall mucosa   No pooled secretions    Larynx:  Epiglottis, false vocal cord, true vocal cord normal in appearance, bilaterally mobile cords  No significant signs of LPR    Neck: No visible mass or asymmetry   thyroid -  Normal   Normal range of motion   Lymphatic:  no abnormal nodes "   Cardiovascular: warm, pink, well-perfused extremities without swelling, tenderness, or edema   Respiratory: Normal respiratory effort, no stridor   Neuro/Psych.:  mood/affect -  normal  mental status -  normal  cranial nerves -  normal        PROCEDURES:   Flexible fiberoptic laryngoscopy: Verbal consent was obtained. The nasal cavity was prepped with an aerosolized solution of topical anesthetic and vasoconstrictive agent. The scope was passed through the anterior nasal cavity and advanced. Inspection of the nasopharynx revealed no gross abnormality. Inspection of the larynx revealed bilaterally mobile vocal cords. Pyriform sinuses are symmetric. No intra-arytenoid irregularities noted. The epiglottis, aryepiglottic folds, vallecula and base of tongue are unremarkable. There are radiation changes to the mucosa. The airway is patent. Procedure tolerated well with no immediate complications noted.    IMPRESSION AND PLAN:   Mr Wise has a history of oropharyngeal cancer, 3 years from his treatment. He has no evidence of recurrence at this time. We will plan on repeat imaging in November 2017 with his small pulmonary nodules. We will have him see speech today to discuss swallowing exercises. I did offer him a swallow study but he deferred at this time. If his swallowing becomes more bothersome we can certainly obtain a swallow study at his next visit. I will plan on seeing him back in about 4 months.    Thank you very much for the opportunity to participate in the care of your patient.      Yazmin Bundy M.D.  Otolaryngology- Head & Neck Surgery      CC:  [facility]   of Medicine  Division of Hematology, Oncology, and Transplantation    Valery Carreon MD  Department of Radiation Oncology  [facility]

## 2017-05-03 NOTE — LETTER
5/3/2017       RE: Jose Wise  [address] [id number]     Dear Colleague,    Thank you for referring your patient, Jose Wise, to the [facility] EAR NOSE AND THROAT at [facility]. Please see a copy of my visit note below.    Dear Dr. Alvarez:    I had the pleasure of seeing Jose Wise in follow-up today at the [facility] Otolaryngology Clinic.     History of Present Illness:   Mr Wise is a 56 year old man with a history of a [id number] SCC of the right tonsil. He was treated with concurrent chemoradiation, completed 3/14/2014. His post-treatment PET showed complete treatment response. His last imaging in November 2016 did show several small pulmonary nodules, with the plan for repeat imaging in November 2017. His last TSH was in 11/2016 and was normal. He was last seen in my clinic 2/2017, at which time he was doing well. He comes in today with his only complaint being recently having nightly heartburn. He intermittently takes Rolaids for this. His swallowing is stable, with needs for liquids while eating to help facilitate swallowing. He says he lost his swallowing exercises instructions and can't remember how to do them. He denies any sore throat, odynophagia, otalgia, neck masses, hemoptysis. His weight is stable.         MEDICATIONS:     Current Outpatient Prescriptions   Medication Sig Dispense Refill     ranitidine (ZANTAC) 300 MG tablet Take 1 tablet (300 mg) by mouth At Bedtime 30 tablet 5     mometasone (ASMANEX 30 METERED DOSES) 220 MCG/INH Inhaler Inhale 1 puff into the lungs daily 3 Inhaler 3     clobetasol (TEMOVATE) 0.05 % external solution Apply topically 2 times daily 50 mL 3     ketoconazole (NIZORAL) 2 % cream Apply topically daily To sides of nose and eyebrows for seborrheic dermatitis of the face. 30 g 2     ketoconazole (NIZORAL) 2 % shampoo Apply topically three times a week Alternate with H&S/Selsun Blue, and  salicylic acid 120 mL 3     metroNIDAZOLE (METROCREAM) 0.75 % cream Apply topically 2 times daily as needed 45 g 11     albuterol (VENTOLIN HFA) 108 (90 BASE) MCG/ACT inhaler Inhale 2 puffs into the lungs 4 times daily as needed 1 Inhaler 11     calcium carbonate (OS-KAVYA 500 MG [place]. [place]) 500 MG tablet Take by mouth 2 times daily       Glucosamine-Chondroitin (GLUCOSAMINE CHONDR COMPLEX PO) Take 1,000 mg by mouth       Omega-3 Fatty Acids (FISH OIL) 1200 MG CAPS        Multiple Vitamin (MULTI-VITAMIN PO) Take  by mouth. Occasionally           ALLERGIES:    Allergies   Allergen Reactions     Animal Dander Itching     Pet hair causes watery eyes and sneezing     Grass Itching     Red tipped grasses, sneezing and watery eyes       HABITS/SOCIAL HISTORY:     Quit smoking years ago    PAST MEDICAL HISTORY:   Past Medical History:   Diagnosis Date     Asthma      Chemical dependency (H)      Contact with or exposure to other communicable diseases      History of radiation therapy      Hoarseness      Impacted cerumen 10/14/2013     Impaired fasting glucose 9/4/2012     Lateral epicondylitis  of elbow      Pain in joint, upper arm      Pure hypercholesterolemia      Rosacea 12/5/2012     Seasonal allergies      Spasm of muscle      Substance abuse     POLYSUBSTANCE     Tension headache, chronic 10/17/2012     Tinea versicolor 12/5/2012     Tinnitus      Tonsillar cancer (H) 2013    chemo and radiation     Unspecified asthma(493.90)     takes daily inhaler, never been intubated, no attacks  since 2012        FAMILY HISTORY:    Family History   Problem Relation Age of Onset     Circulatory Mother 45     cerebral aneurysm     Alcohol/Drug Mother      sibling     Other - See Comments Mother      aneurysm     Substance Abuse Mother      CANCER Mother      Cardiovascular Father      Heart Attack     Coronary Artery Disease Father      heart attack/ triple bypass     CANCER Father      skin cancer     Skin  "Cancer Father      Other Cancer Brother      upper thorax     Substance Abuse Brother      CANCER Sister      CANCER Brother      GASTROINTESTINAL DISEASE Son      crohn's disease/ colostomy     Melanoma No family hx of        REVIEW OF SYSTEMS:  12 point ROS was negative other than the symptoms noted above in the HPI.  Patient Supplied Answers to Review of Systems  [facility] ROS 4/30/2017   Constitutional -   Neurology Dizzy spells, Headache   Ears, Nose, Throat Ringing/noise in ears, Trouble swallowing   Gastrointestinal/Genitourinary Heartburn/indigestion   Musculoskeletal -   Allergy/Immunology -   Endocrine -         PHYSICAL EXAMINATION:   Ht 1.7 m (5' 6.93\")  Wt 72.1 kg (159 lb)  BMI 24.96 kg/m2  Appearance:   normal; NAD, age-appropriate appearance, well-developed, normal habitus   Communication:   normal; communicates verbally, normal voice quality   Head/Face:   inspection -  Normal; no scars or visible lesions   Salivary glands -  Normal size, no tenderness, swelling, or palpable masses   Facial strength -  Normal and symmetric bilateral; H/B I/VI   Skin:  normal, no rash   Ocular Motility:  normal occular movements   Ears:  auricle (AD) -  normal  EAC (AD) -  normal  TM (AD) -  Normal, no effusion  auricle (AS) -  normal  EAC (AS) -  normal  TM (AS) -  Normal, no effusion  Normal clinical speech reception   Nose:  Ext. inspection -  Normal  Internal Inspection -  Normal mucosa, septum, and turbinates   Nasopharynx:  normal mucosa, no masses   Oral Cavity:  lips -  Normal mucosa, oral competence, and stoma size   Age-appropriate dentition, healthy gingival mucosa   Hard palate, buccal, floor of mouth mucosa normal   Tongue - normal movement, no lesions, normal sensation, no palpable BOT masses   Oropharynx:  mucosa -  Normal, no lesions  soft palate -  Normal, no lesions, no asymmetry, normal elevation  No palpable masses in BOT or tonsillar fossa   Hypopharynx:  Normal pyriform sinus and pharyngeal wall " mucosa   No pooled secretions    Larynx:  Epiglottis, false vocal cord, true vocal cord normal in appearance, bilaterally mobile cords  No significant signs of LPR    Neck: No visible mass or asymmetry   thyroid -  Normal   Normal range of motion   Lymphatic:  no abnormal nodes   Cardiovascular: warm, pink, well-perfused extremities without swelling, tenderness, or edema   Respiratory: Normal respiratory effort, no stridor   Neuro/Psych.:  mood/affect -  normal  mental status -  normal  cranial nerves -  normal        PROCEDURES:   Flexible fiberoptic laryngoscopy: Verbal consent was obtained. The nasal cavity was prepped with an aerosolized solution of topical anesthetic and vasoconstrictive agent. The scope was passed through the anterior nasal cavity and advanced. Inspection of the nasopharynx revealed no gross abnormality. Inspection of the larynx revealed bilaterally mobile vocal cords. Pyriform sinuses are symmetric. No intra-arytenoid irregularities noted. The epiglottis, aryepiglottic folds, vallecula and base of tongue are unremarkable. There are radiation changes to the mucosa. The airway is patent. Procedure tolerated well with no immediate complications noted.    IMPRESSION AND PLAN:   Mr Wise has a history of oropharyngeal cancer, 3 years from his treatment. He has no evidence of recurrence at this time. We will plan on repeat imaging in November 2017 with his small pulmonary nodules. We will have him see speech today to discuss swallowing exercises. I did offer him a swallow study but he deferred at this time. If his swallowing becomes more bothersome we can certainly obtain a swallow study at his next visit. I will plan on seeing him back in about 4 months.    Thank you very much for the opportunity to participate in the care of your patient.      Yazmin Bundy M.D.  Otolaryngology- Head & Neck Surgery      CC:  [facility]   of Medicine  Division of Hematology, Oncology,  and Transplantation    Valery Carreon MD  Department of Radiation Oncology  [facility]

## 2017-05-03 NOTE — MR AVS SNAPSHOT
After Visit Summary   5/3/2017    Jose Wise    MRN: [id number]           Patient Information     Date Of Birth          1960        Visit Information        Provider Department      5/3/2017 4:00 PM Yazmin Bundy MD [facility]        Today's Diagnoses     Gastroesophageal reflux disease without esophagitis    -  1      Care Instructions    Please follow up to see Dr Bundy in 4 months. For questions or concerns please call the RN care coordinator.   Ector MajorRN  [phone]            Follow-ups after your visit        Your next 10 appointments already scheduled     May 16, 2017  3:45 PM CDT   [facility]   [facility] ([facility])    [address] [place] [address]  [facility] [address]   [phone]            May 16, 2017  4:20 PM CDT   (Arrive by 4:05 PM)   Return Visit with Dana Boston PA-C   [facility] ([facility])    [address] [place] [address]  [facility] [address]   [phone]            Sep 06, 2017  4:20 PM CDT   (Arrive by 4:05 PM)   Return Visit with Yazmin Bundy MD   [facility] ([facility])    [address] [place] [address]  4th Floor  [facility] [address]   [phone]            Nov 14, 2017 12:00 PM CST   (Arrive by 11:45 AM)   CT CHEST W CONTRAST with UCCT1   [facility] ([facility])    [address] [place] [address]  1st Floor  [facility] [address]   [phone]           Please bring any scans or X-rays taken at other hospitals, if similar tests were done. Also bring a list of your medicines, including vitamins, minerals and over-the-counter drugs. It is safest to leave personal items at home.  Be sure to tell your doctor:   If you have any allergies.   If there s any chance you are pregnant.   If you are  breastfeeding.   If you have any special needs.  You will have contrast for this exam. To prepare:   Do not eat or drink for 2 hours before your exam. If you need to take medicine, you may take it with small sips of water. (We may ask you to take liquid medicine as well.)   The day before your exam, drink extra fluids at least six 8-ounce glasses (unless your doctor tells you to restrict your fluids).  Patients over 70 or patients with diabetes or kidney problems:   If you haven t had a blood test (creatinine test) within the last 30 days, go to your clinic or Diagnostic Imaging Department for this test.  If you have diabetes:   If your kidney function is normal, continue taking your metformin (Avandamet, Glucophage, Glucovance, Metaglip) on the day of your exam.   If your kidney function is abnormal, wait 48 hours before restarting this medicine.  Please wear loose clothing, such as a sweat suit or jogging clothes. Avoid snaps, zippers and other metal. We may ask you to undress and put on a hospital gown.  If you have any questions, please call the Imaging Department where you will have your exam.            Nov 14, 2017 12:20 PM CST   (Arrive by 12:05 PM)   CT SOFT TISSUE NECK W CONTRAST with UCCT1   [facility] ([facility])    [address] [address] [phone]           Please bring any scans or X-rays taken at other hospitals, if similar tests were done. Also bring a list of your medicines, including vitamins, minerals and over-the-counter drugs. It is safest to leave personal items at home.  Be sure to tell your doctor:   If you have any allergies.   If there s any chance you are pregnant.   If you are breastfeeding.   If you have any special needs.  You will have contrast for this exam. To prepare:   Do not eat or drink for 2 hours before your exam. If you need to take medicine, you may take it with small sips of water. (We may ask  you to take liquid medicine as well.)   The day before your exam, drink extra fluids at least six 8-ounce glasses (unless your doctor tells you to restrict your fluids).  Patients over 70 or patients with diabetes or kidney problems:   If you haven t had a blood test (creatinine test) within the last 30 days, go to your clinic or Diagnostic Imaging Department for this test.  If you have diabetes:   If your kidney function is normal, continue taking your metformin (Avandamet, Glucophage, Glucovance, Metaglip) on the day of your exam.   If your kidney function is abnormal, wait 48 hours before restarting this medicine.  Please wear loose clothing, such as a sweat suit or jogging clothes. Avoid snaps, zippers and other metal. We may ask you to undress and put on a hospital gown.  If you have any questions, please call the Imaging Department where you will have your exam.            Nov 14, 2017  3:00 PM CST   (Arrive by 2:45 PM)   Return Visit with Janes Romo DO   [facility] ([facility] [place])    [address] [place] [address] [phone]              Who to contact     Please call your clinic at [phone] to:    Ask questions about your health    Make or cancel appointments    Discuss your medicines    Learn about your test results    Speak to your doctor   If you have compliments or concerns about an experience at your clinic, or if you wish to file a complaint, please contact [facility] Patient Relations at [phone] or email us at [email or web address]         Additional Information About Your Visit        MyChart Information     [employer] gives you secure access to your electronic health record. If you see a primary care provider, you can also send messages to your care team and make appointments. If you have questions, please call your primary care clinic.  If you do not have a primary care  "provider, please call [phone] and they will assist you.      [employer] is an electronic gateway that provides easy, online access to your medical records. With [employer], you can request a clinic appointment, read your test results, renew a prescription or communicate with your care team.     To access your existing account, please contact your [facility] or call [phone] for assistance.        Care EveryWhere ID     This is your Care EveryWhere ID. This could be used by other organizations to access your [place] medical records  [id number]        Your Vitals Were     Height BMI (Body Mass Index)                1.7 m (5' 6.93\") 24.96 kg/m2           Blood Pressure from Last 3 Encounters:   02/01/17 132/81   11/08/16 132/87   08/26/16 117/81    Weight from Last 3 Encounters:   05/03/17 72.1 kg (159 lb)   02/01/17 71.2 kg (157 lb)   11/09/16 70.8 kg (156 lb)              Today, you had the following     No orders found for display         Today's Medication Changes          These changes are accurate as of: 5/3/17  4:14 PM.  If you have any questions, ask your nurse or doctor.               Start taking these medicines.        Dose/Directions    ranitidine 300 MG tablet   Commonly known as:  ZANTAC   Used for:  Gastroesophageal reflux disease without esophagitis   Started by:  Yazmin Bundy MD        Dose:  300 mg   Take 1 tablet (300 mg) by mouth At Bedtime   Quantity:  30 tablet   Refills:  5            Where to get your medicines      These medications were sent to [facility]/pharmacy #[id number] - [place] - [address] [address]     Phone:  [phone]     ranitidine 300 MG tablet                Primary Care Provider Office Phone # Fax #    Nicolás Morejon [phone]       [facility] [address] [facility] [address]        Thank you!     Thank you for choosing [facility] E[facility]SE [facility] "  for your care. Our goal is always to provide you with excellent care. Hearing back from our patients is one way we can continue to improve our services. Please take a few minutes to complete the written survey that you may receive in the mail after your visit with us. Thank you!             Your Updated Medication List - Protect others around you: Learn how to safely use, store and throw away your medicines at www.disposemymeds.org.          This list is accurate as of: 5/3/17  4:14 PM.  Always use your most recent med list.                   Brand Name Dispense Instructions for use    albuterol 108 (90 BASE) MCG/ACT Inhaler    VENTOLIN HFA    1 Inhaler    Inhale 2 puffs into the lungs 4 times daily as needed       calcium carbonate 500 MG tablet    OS-KAVYA 500 mg [place]. Ca     Take by mouth 2 times daily       clobetasol 0.05 % external solution    TEMOVATE    50 mL    Apply topically 2 times daily       Fish Oil 1200 MG Caps          GLUCOSAMINE CHONDR COMPLEX PO      Take 1,000 mg by mouth       * ketoconazole 2 % shampoo    NIZORAL    120 mL    Apply topically three times a week Alternate with H&S/Selsun Blue, and salicylic acid       * ketoconazole 2 % cream    NIZORAL    30 g    Apply topically daily To sides of nose and eyebrows for seborrheic dermatitis of the face.       metroNIDAZOLE 0.75 % cream    METROCREAM    45 g    Apply topically 2 times daily as needed       mometasone 220 MCG/INH Inhaler    ASMANEX 30 METERED DOSES    3 Inhaler    Inhale 1 puff into the lungs daily       MULTI-VITAMIN PO      Take  by mouth. Occasionally       ranitidine 300 MG tablet    ZANTAC    30 tablet    Take 1 tablet (300 mg) by mouth At Bedtime       * Notice:  This list has 2 medication(s) that are the same as other medications prescribed for you. Read the directions carefully, and ask your doctor or other care provider to review them with you.

## 2017-05-16 ENCOUNTER — APPOINTMENT (OUTPATIENT)
Dept: LAB | Facility: CLINIC | Age: 57
End: 2017-05-16
Attending: PHYSICIAN ASSISTANT
Payer: COMMERCIAL

## 2017-05-16 ENCOUNTER — ONCOLOGY VISIT (OUTPATIENT)
Dept: ONCOLOGY | Facility: CLINIC | Age: 57
End: 2017-05-16
Attending: PHYSICIAN ASSISTANT
Payer: COMMERCIAL

## 2017-05-16 VITALS
SYSTOLIC BLOOD PRESSURE: 123 MMHG | BODY MASS INDEX: 24.88 KG/M2 | WEIGHT: 158.5 LBS | RESPIRATION RATE: 16 BRPM | TEMPERATURE: 98.4 F | DIASTOLIC BLOOD PRESSURE: 85 MMHG | OXYGEN SATURATION: 98 % | HEART RATE: 91 BPM

## 2017-05-16 DIAGNOSIS — C09.0 MALIGNANT NEOPLASM OF TONSILLAR FOSSA (H): ICD-10-CM

## 2017-05-16 LAB
ALBUMIN SERPL-MCNC: 3.8 G/DL (ref 3.4–5)
ALP SERPL-CCNC: 79 U/L (ref 40–150)
ALT SERPL W P-5'-P-CCNC: 24 U/L (ref 0–70)
ANION GAP SERPL CALCULATED.3IONS-SCNC: 8 MMOL/L (ref 3–14)
AST SERPL W P-5'-P-CCNC: 18 U/L (ref 0–45)
BASOPHILS # BLD AUTO: 0 10E9/L (ref 0–0.2)
BASOPHILS NFR BLD AUTO: 0.4 %
BILIRUB SERPL-MCNC: 0.5 MG/DL (ref 0.2–1.3)
BUN SERPL-MCNC: 19 MG/DL (ref 7–30)
CALCIUM SERPL-MCNC: 8.6 MG/DL (ref 8.5–10.1)
CHLORIDE SERPL-SCNC: 104 MMOL/L (ref 94–109)
CO2 SERPL-SCNC: 25 MMOL/L (ref 20–32)
CREAT SERPL-MCNC: 0.94 MG/DL (ref 0.66–1.25)
DIFFERENTIAL METHOD BLD: NORMAL
EOSINOPHIL # BLD AUTO: 0.1 10E9/L (ref 0–0.7)
EOSINOPHIL NFR BLD AUTO: 3.1 %
ERYTHROCYTE [DISTWIDTH] IN BLOOD BY AUTOMATED COUNT: 12.6 % (ref 10–15)
GFR SERPL CREATININE-BSD FRML MDRD: 82 ML/MIN/1.7M2
GLUCOSE SERPL-MCNC: 94 MG/DL (ref 70–99)
HCT VFR BLD AUTO: 41.9 % (ref 40–53)
HGB BLD-MCNC: 14.4 G/DL (ref 13.3–17.7)
IMM GRANULOCYTES # BLD: 0 10E9/L (ref 0–0.4)
IMM GRANULOCYTES NFR BLD: 0.2 %
LYMPHOCYTES # BLD AUTO: 1.2 10E9/L (ref 0.8–5.3)
LYMPHOCYTES NFR BLD AUTO: 26.4 %
MCH RBC QN AUTO: 31.6 PG (ref 26.5–33)
MCHC RBC AUTO-ENTMCNC: 34.4 G/DL (ref 31.5–36.5)
MCV RBC AUTO: 92 FL (ref 78–100)
MONOCYTES # BLD AUTO: 0.5 10E9/L (ref 0–1.3)
MONOCYTES NFR BLD AUTO: 10.9 %
NEUTROPHILS # BLD AUTO: 2.7 10E9/L (ref 1.6–8.3)
NEUTROPHILS NFR BLD AUTO: 59 %
NRBC # BLD AUTO: 0 10*3/UL
NRBC BLD AUTO-RTO: 0 /100
PLATELET # BLD AUTO: 186 10E9/L (ref 150–450)
POTASSIUM SERPL-SCNC: 4 MMOL/L (ref 3.4–5.3)
PROT SERPL-MCNC: 6.9 G/DL (ref 6.8–8.8)
RBC # BLD AUTO: 4.56 10E12/L (ref 4.4–5.9)
SODIUM SERPL-SCNC: 138 MMOL/L (ref 133–144)
TSH SERPL DL<=0.005 MIU/L-ACNC: 2.84 MU/L (ref 0.4–4)
WBC # BLD AUTO: 4.6 10E9/L (ref 4–11)

## 2017-05-16 PROCEDURE — 99214 OFFICE O/P EST MOD 30 MIN: CPT | Performed by: PHYSICIAN ASSISTANT

## 2017-05-16 PROCEDURE — 85025 COMPLETE CBC W/AUTO DIFF WBC: CPT | Performed by: INTERNAL MEDICINE

## 2017-05-16 PROCEDURE — 36415 COLL VENOUS BLD VENIPUNCTURE: CPT

## 2017-05-16 PROCEDURE — 80053 COMPREHEN METABOLIC PANEL: CPT | Performed by: INTERNAL MEDICINE

## 2017-05-16 PROCEDURE — 84443 ASSAY THYROID STIM HORMONE: CPT | Performed by: INTERNAL MEDICINE

## 2017-05-16 PROCEDURE — 99212 OFFICE O/P EST SF 10 MIN: CPT | Mod: ZF

## 2017-05-16 ASSESSMENT — PAIN SCALES - GENERAL: PAINLEVEL: NO PAIN (0)

## 2017-05-16 NOTE — LETTER
5/16/2017      RE: Jose Wise  [address] [facility] [address]       REASON FOR VISIT:    CANCER STAGE: MALIG NEOPLASM TONSILLAR FOSSA    Staging form: Pharynx - Oropharynx, AJCC 7th Edition      Clinical: Stage KWADWO (T2, N2b, M0) - Signed by Janes Romo DO on 11/8/2016     HISTORY OF PRESENT ILLNESS:  - 10/2013 lump in his R neck.  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 There was no LN element so it could be a soft tissue mass.  - 1/9/14 He was referred to Dr. Jayashree Alvarez at [facility] who perfromed a laryngoscopy on  revealed enlarged R tonsil.    - 1/15/14 Direct laryngoscopy and a biopsy of the R tonsil was identified squamous cell carcinoma.  P16+.     - 1/9/14 PET/CT showed a hypermetabolic tonsillar mass and 2 hypermetabolic LNs on the R side in level 2, measured 2.1cm.  No evidence of distant mets, but multiple tiny pulmonary nodules were seen, felt unlikely to be mets.     -1/17/14 he consulted radiation oncology and Dr. Romo in medical oncology.     -1/27/14 started on chemorads with HD cisplatin. He developed ototoxicity and was switched to carboplatin (AUC 6) for day 22.  He did not receive carboplatin day 43 due to thrombocytopenia.    Radiation therapy completed on 3/14/14 (7000cGy).    - His PET/CT after treatment showed a complete response. .       Jose returns today in follow up. He is a little over 3 years out from completion of therapy. He is feeling well. He continues to have dry mouth, but is able to eat most foods well. His hearing is still affected but does not really bother him. He is very active and him and his wife just placed an offer on a new home today and are hoping to move to a new home locally. He has had some worsening dysphagia over the past several months. He discussed this with ENT earlier this month. His exam and in clinic scope was not revealing for any signs of disease. They discussed a VSS but he preferred to start with swallow  exercises before further w/u. It is minimally bothersome to him at this time and not really limiting. He otherwise has no concerns.     Review Of Systems  10-point review of systems were negative except as noted in HPI.        EXAM:  Blood pressure 123/85, pulse 91, temperature 98.4  F (36.9  C), temperature source Oral, resp. rate 16, weight 71.9 kg (158 lb 8 oz), SpO2 98 %.   Wt Readings from Last 10 Encounters:   05/16/17 71.9 kg (158 lb 8 oz)   05/03/17 72.1 kg (159 lb)   02/01/17 71.2 kg (157 lb)   11/09/16 70.8 kg (156 lb)   11/08/16 71.1 kg (156 lb 12 oz)   08/26/16 70.8 kg (156 lb)   05/10/16 71.6 kg (157 lb 12.8 oz)   03/16/16 71.7 kg (158 lb)   01/28/16 71.2 kg (157 lb)   01/18/16 71.2 kg (157 lb)       GEN: alert and oriented x 3, nad  HEENT: perrla, eomi, sclera anicteric, oral mucosa moist without thrush or lesions  NECK: supple, no palpable LAD  HT: reg rate and rhythm, no murmurs  LUNGS: clear to auscultation bilaterally  ABD: soft, nt, nd, +bs x 4  EXT: no clubbing, cyanosis, or edema  NEURO: CN 2-12 grossly intact, MS grossly 5/5 b/l    Current Outpatient Prescriptions   Medication Sig Dispense Refill     ranitidine (ZANTAC) 300 MG tablet Take 1 tablet (300 mg) by mouth At Bedtime 30 tablet 5     mometasone (ASMANEX 30 METERED DOSES) 220 MCG/INH Inhaler Inhale 1 puff into the lungs daily 3 Inhaler 3     clobetasol (TEMOVATE) 0.05 % external solution Apply topically 2 times daily 50 mL 3     ketoconazole (NIZORAL) 2 % cream Apply topically daily To sides of nose and eyebrows for seborrheic dermatitis of the face. 30 g 2     ketoconazole (NIZORAL) 2 % shampoo Apply topically three times a week Alternate with H&S/Selsun Blue, and salicylic acid 120 mL 3     metroNIDAZOLE (METROCREAM) 0.75 % cream Apply topically 2 times daily as needed 45 g 11     albuterol (VENTOLIN HFA) 108 (90 BASE) MCG/ACT inhaler Inhale 2 puffs into the lungs 4 times daily as needed 1 Inhaler 11     calcium carbonate (OS-KAVYA 500  MG [place]. [place]) 500 MG tablet Take by mouth 2 times daily       Glucosamine-Chondroitin (GLUCOSAMINE CHONDR COMPLEX PO) Take 1,000 mg by mouth       Omega-3 Fatty Acids (FISH OIL) 1200 MG CAPS        Multiple Vitamin (MULTI-VITAMIN PO) Take  by mouth. Occasionally         LABs:   Results for NGA SWEET (MRN [id number]) as of 5/18/2017 07:25   Ref. Range 11/7/2016 10:42 5/16/2017 15:46   Sodium Latest Ref Range: 133 - 144 mmol/L 139 138   Potassium Latest Ref Range: 3.4 - 5.3 mmol/L 4.0 4.0   Chloride Latest Ref Range: 94 - 109 mmol/L 104 104   Carbon Dioxide Latest Ref Range: 20 - 32 mmol/L 29 25   Urea Nitrogen Latest Ref Range: 7 - 30 mg/dL 16 19   Creatinine Latest Ref Range: 0.66 - 1.25 mg/dL 1.05 0.94   GFR Estimate Latest Ref Range: >60 mL/min/1.7m2 73 82   GFR Estimate If Black Latest Ref Range: >60 mL/min/1.7m2 88 >90...   Calcium Latest Ref Range: 8.5 - 10.1 mg/dL 8.2 (L) 8.6   Anion Gap Latest Ref Range: 3 - 14 mmol/L 6 8   Albumin Latest Ref Range: 3.4 - 5.0 g/dL 3.2 (L) 3.8   Protein Total Latest Ref Range: 6.8 - 8.8 g/dL 6.1 (L) 6.9   Bilirubin Total Latest Ref Range: 0.2 - 1.3 mg/dL 0.7 0.5   Alkaline Phosphatase Latest Ref Range: 40 - 150 U/L 67 79   ALT Latest Ref Range: 0 - 70 U/L 19 24   AST Latest Ref Range: 0 - 45 U/L 13 18   TSH Latest Ref Range: 0.40 - 4.00 mU/L 2.74 2.84   Glucose Latest Ref Range: 70 - 99 mg/dL 89 94   WBC Latest Ref Range: 4.0 - 11.0 10e9/L 3.9 (L) 4.6   Hemoglobin Latest Ref Range: 13.3 - 17.7 g/dL 13.0 (L) 14.4   Hematocrit Latest Ref Range: 40.0 - 53.0 % 38.5 (L) 41.9   Platelet Count Latest Ref Range: 150 - 450 10e9/L 166 186   RBC Count Latest Ref Range: 4.4 - 5.9 10e12/L 4.13 (L) 4.56   MCV Latest Ref Range: 78 - 100 fl 93 92   MCH Latest Ref Range: 26.5 - 33.0 pg 31.5 31.6   MCHC Latest Ref Range: 31.5 - 36.5 g/dL 33.8 34.4   RDW Latest Ref Range: 10.0 - 15.0 % 12.6 12.6   Diff Method Unknown Automated Method Automated Method   % Neutrophils Latest Units: % 65.0  59.0   % Lymphocytes Latest Units: % 23.0 26.4   % Monocytes Latest Units: % 9.2 10.9   % Eosinophils Latest Units: % 2.0 3.1   % Basophils Latest Units: % 0.5 0.4   % Immature Granulocytes Latest Units: % 0.3 0.2   Nucleated RBCs Latest Ref Range: 0 /100 0 0   Absolute Neutrophil Latest Ref Range: 1.6 - 8.3 10e9/L 2.5 2.7   Absolute Lymphocytes Latest Ref Range: 0.8 - 5.3 10e9/L 0.9 1.2   Absolute Monocytes Latest Ref Range: 0.0 - 1.3 10e9/L 0.4 0.5   Absolute Eosinophils Latest Ref Range: 0.0 - 0.7 10e9/L 0.1 0.1   Absolute Basophils Latest Ref Range: 0.0 - 0.2 10e9/L 0.0 0.0   Abs Immature Granulocytes Latest Ref Range: 0 - 0.4 10e9/L 0.0 0.0   Absolute Nucleated RBC Unknown 0.0 0.0                 Assessment/Plan  R tonsil cancer [id number] - HPV+ - Jose is doing great. He has not had any evidence of disease on his scans-last in Nov 2016 and is a little over 3 years out from treatment.  He has minimal late toxicity from treatment. He has had some worsening dysphagia over the past several months-it is minimally bothersome at this time and not really limiting. He discussed this at his ENT appt earlier this month. Their exam at that time was unremarkable. They had recommended a videoswallow study, but Jose decided he would like to meet with speech and work on some swallowing exercises first and if he was not seeming to notice any change then would pursue further w/u. He has not really been diligent about doing hte exercises. He will start doing them routinely and let us know if there is no improvement-he again notes this change is quite mild at this time. Otherwise he has ENT f/u in Sept and sees Dr. Romo again in November with annual CT chest and CT neck.      Pulmonary nodules - These are small and stable.  We will continue to follow with yearly surveillance.     TSH - His TSH is normal today.     It is my privilege to be involved in the care of the above patient.     Dana Boston PA-C  [facility]  [address]  [place] [address]  [phone]

## 2017-05-16 NOTE — MR AVS SNAPSHOT
After Visit Summary   5/16/2017    Jose Wise    MRN: [id number]           Patient Information     Date Of Birth          1960        Visit Information        Provider Department      5/16/2017 4:20 PM Dana Boston PA-C [facility]        Today's Diagnoses     MALIG NEOPLASM TONSILLAR FOSSA           Follow-ups after your visit        Your next 10 appointments already scheduled     Sep 06, 2017  4:20 PM CDT   (Arrive by 4:05 PM)   Return Visit with Yazmin Bundy MD   [facility] ([facility] [place])    [address]  4th Floor  [facility] [address]   [phone]            Nov 14, 2017 12:00 PM CST   (Arrive by 11:45 AM)   CT CHEST W CONTRAST with UCCT1   [facility] CT ([facility] [place])    [address]  1st Floor  [facility] [id number] [phone]           Please bring any scans or X-rays taken at other hospitals, if similar tests were done. Also bring a list of your medicines, including vitamins, minerals and over-the-counter drugs. It is safest to leave personal items at home.  Be sure to tell your doctor:   If you have any allergies.   If there s any chance you are pregnant.   If you are breastfeeding.   If you have any special needs.  You will have contrast for this exam. To prepare:   Do not eat or drink for 2 hours before your exam. If you need to take medicine, you may take it with small sips of water. (We may ask you to take liquid medicine as well.)   The day before your exam, drink extra fluids at least six 8-ounce glasses (unless your doctor tells you to restrict your fluids).  Patients over 70 or patients with diabetes or kidney problems:   If you haven t had a blood test (creatinine test) within the last 30 days, go to your clinic or Diagnostic Imaging Department for this test.  If you have diabetes:   If your kidney function is normal, continue  taking your metformin (Avandamet, Glucophage, Glucovance, Metaglip) on the day of your exam.   If your kidney function is abnormal, wait 48 hours before restarting this medicine.  Please wear loose clothing, such as a sweat suit or jogging clothes. Avoid snaps, zippers and other metal. We may ask you to undress and put on a hospital gown.  If you have any questions, please call the Imaging Department where you will have your exam.            Nov 14, 2017 12:20 PM CST   (Arrive by 12:05 PM)   CT SOFT TISSUE NECK W CONTRAST with UCCT1   [facility] [place] CT ([facility] [place])    [address]  1st Floor  [facility] [address] [phone]           Please bring any scans or X-rays taken at other hospitals, if similar tests were done. Also bring a list of your medicines, including vitamins, minerals and over-the-counter drugs. It is safest to leave personal items at home.  Be sure to tell your doctor:   If you have any allergies.   If there s any chance you are pregnant.   If you are breastfeeding.   If you have any special needs.  You will have contrast for this exam. To prepare:   Do not eat or drink for 2 hours before your exam. If you need to take medicine, you may take it with small sips of water. (We may ask you to take liquid medicine as well.)   The day before your exam, drink extra fluids at least six 8-ounce glasses (unless your doctor tells you to restrict your fluids).  Patients over 70 or patients with diabetes or kidney problems:   If you haven t had a blood test (creatinine test) within the last 30 days, go to your clinic or Diagnostic Imaging Department for this test.  If you have diabetes:   If your kidney function is normal, continue taking your metformin (Avandamet, Glucophage, Glucovance, Metaglip) on the day of your exam.   If your kidney function is abnormal, wait 48 hours before restarting this medicine.  Please wear loose clothing, such as a sweat suit or  jogging clothes. Avoid snaps, zippers and other metal. We may ask you to undress and put on a hospital gown.  If you have any questions, please call the Imaging Department where you will have your exam.            Nov 14, 2017  3:00 PM CST   (Arrive by 2:45 PM)   Return Visit with Janes Romo DO   [facility] ([facility] [place])    [address]  2nd Floor  [facility] [address] [phone]              Who to contact     If you have questions or need follow up information about today's clinic visit or your schedule please contact [facility] directly at [phone].  Normal or non-critical lab and imaging results will be communicated to you by Ch[employer]an[employer], letter or phone within 4 business days after the clinic has received the results. If you do not hear from us within 7 days, please contact the clinic through Chobanihart or phone. If you have a critical or abnormal lab result, we will notify you by phone as soon as possible.  Submit refill requests through [employer] or call your pharmacy and they will forward the refill request to us. Please allow 3 business days for your refill to be completed.          Additional Information About Your Visit        [employer] Information     [employer] gives you secure access to your electronic health record. If you see a primary care provider, you can also send messages to your care team and make appointments. If you have questions, please call your primary care clinic.  If you do not have a primary care provider, please call [phone] and they will assist you.        Care EveryWhere ID     This is your Care EveryWhere ID. This could be used by other organizations to access your [place] medical records  [id number]        Your Vitals Were     Pulse Temperature Respirations Pulse Oximetry BMI (Body Mass Index)       91 98.4  F (36.9  C) (Oral) 16 98% 24.88 kg/m2        Blood Pressure from Last 3 Encounters:    05/16/17 123/85   02/01/17 132/81   11/08/16 132/87    Weight from Last 3 Encounters:   05/16/17 71.9 kg (158 lb 8 oz)   05/03/17 72.1 kg (159 lb)   02/01/17 71.2 kg (157 lb)              We Performed the Following     CBC with platelets differential     Comprehensive metabolic panel     TSH with free T4 reflex        Primary Care Provider Office Phone # Fax #    Nicolás Morejon [phone]       [facility] [address] [facility] [address]        Thank you!     Thank you for choosing [facility]  for your care. Our goal is always to provide you with excellent care. Hearing back from our patients is one way we can continue to improve our services. Please take a few minutes to complete the written survey that you may receive in the mail after your visit with us. Thank you!             Your Updated Medication List - Protect others around you: Learn how to safely use, store and throw away your medicines at www.disposemymeds.org.          This list is accurate as of: 5/16/17 11:59 PM.  Always use your most recent med list.                   Brand Name Dispense Instructions for use    albuterol 108 (90 BASE) MCG/ACT Inhaler    VENTOLIN HFA    1 Inhaler    Inhale 2 puffs into the lungs 4 times daily as needed       calcium carbonate 500 MG tablet    OS-KAVYA 500 mg [place]. [place]     Take by mouth 2 times daily       clobetasol 0.05 % external solution    TEMOVATE    50 mL    Apply topically 2 times daily       Fish Oil 1200 MG Caps          GLUCOSAMINE CHONDR COMPLEX PO      Take 1,000 mg by mouth       * ketoconazole 2 % shampoo    NIZORAL    120 mL    Apply topically three times a week Alternate with H&S/Selsun Blue, and salicylic acid       * ketoconazole 2 % cream    NIZORAL    30 g    Apply topically daily To sides of nose and eyebrows for seborrheic dermatitis of the face.       metroNIDAZOLE 0.75 % cream    METROCREAM    45 g    Apply topically 2 times  daily as needed       mometasone 220 MCG/INH Inhaler    ASMANEX 30 METERED DOSES    3 Inhaler    Inhale 1 puff into the lungs daily       MULTI-VITAMIN PO      Take  by mouth. Occasionally       ranitidine 300 MG tablet    ZANTAC    30 tablet    Take 1 tablet (300 mg) by mouth At Bedtime       * Notice:  This list has 2 medication(s) that are the same as other medications prescribed for you. Read the directions carefully, and ask your doctor or other care provider to review them with you.

## 2017-05-16 NOTE — NURSING NOTE
Chief Complaint   Patient presents with     Blood Draw     labs collected from R arm venipuncture, vitals WDL      Arabella Ferrari RN

## 2017-05-16 NOTE — NURSING NOTE
"Oncology Rooming Note    May 16, 2017 4:04 PM   Jose Wise is a 56 year old male who presents for:    Chief Complaint   Patient presents with     Blood Draw     labs collected from R arm venipuncture, vitals WDL      Oncology Clinic Visit     Return for Tonsil Ca      Initial Vitals: /85  Pulse 91  Temp 98.4  F (36.9  C) (Oral)  Resp 16  Wt 71.9 kg (158 lb 8 oz)  SpO2 98%  BMI 24.88 kg/m2 Estimated body mass index is 24.88 kg/(m^2) as calculated from the following:    Height as of 5/3/17: 1.7 m (5' 6.93\").    Weight as of this encounter: 71.9 kg (158 lb 8 oz). Body surface area is 1.84 meters squared.  No Pain (0) Comment: Data Unavailable   No LMP for male patient.  Allergies reviewed: Yes  Medications reviewed: Yes    Medications: Medication refills not needed today.  Pharmacy name entered into EPIC:    [id number] - [place], [place] - [address] [facility] - [place], [place] [address] [place] [address] [facility] - [place], [place] - [address] [place] [address] [facility] [place] - [place], [place] - [address] [place] [address]    Clinical concerns: no    Dana was notified.    6 minutes for nursing intake (face to face time)     Lauren Montero MA              "

## 2017-05-18 NOTE — PROGRESS NOTES
REASON FOR VISIT:    CANCER STAGE: MALIG NEOPLASM TONSILLAR FOSSA    Staging form: Pharynx - Oropharynx, AJCC 7th Edition      Clinical: Stage KWADWO (T2, N2b, M0) - Signed by Janes Romo DO on 11/8/2016     HISTORY OF PRESENT ILLNESS:  - 10/2013 lump in his R neck.  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 There was no LN element so it could be a soft tissue mass.  - 1/9/14 He was referred to Dr. Jayashree Alvarez at [facility] who perfromed a laryngoscopy on  revealed enlarged R tonsil.    - 1/15/14 Direct laryngoscopy and a biopsy of the R tonsil was identified squamous cell carcinoma.  P16+.     - 1/9/14 PET/CT showed a hypermetabolic tonsillar mass and 2 hypermetabolic LNs on the R side in level 2, measured 2.1cm.  No evidence of distant mets, but multiple tiny pulmonary nodules were seen, felt unlikely to be mets.     -1/17/14 he consulted radiation oncology and Dr. Romo in medical oncology.     -1/27/14 started on chemorads with HD cisplatin. He developed ototoxicity and was switched to carboplatin (AUC 6) for day 22.  He did not receive carboplatin day 43 due to thrombocytopenia.    Radiation therapy completed on 3/14/14 (7000cGy).    - His PET/CT after treatment showed a complete response. .       Jose returns today in follow up. He is a little over 3 years out from completion of therapy. He is feeling well. He continues to have dry mouth, but is able to eat most foods well. His hearing is still affected but does not really bother him. He is very active and him and his wife just placed an offer on a new home today and are hoping to move to a new home locally. He has had some worsening dysphagia over the past several months. He discussed this with ENT earlier this month. His exam and in clinic scope was not revealing for any signs of disease. They discussed a VSS but he preferred to start with swallow exercises before further w/u. It is minimally bothersome to him at this time and not  really limiting. He otherwise has no concerns.     Review Of Systems  10-point review of systems were negative except as noted in HPI.        EXAM:  Blood pressure 123/85, pulse 91, temperature 98.4  F (36.9  C), temperature source Oral, resp. rate 16, weight 71.9 kg (158 lb 8 oz), SpO2 98 %.   Wt Readings from Last 10 Encounters:   05/16/17 71.9 kg (158 lb 8 oz)   05/03/17 72.1 kg (159 lb)   02/01/17 71.2 kg (157 lb)   11/09/16 70.8 kg (156 lb)   11/08/16 71.1 kg (156 lb 12 oz)   08/26/16 70.8 kg (156 lb)   05/10/16 71.6 kg (157 lb 12.8 oz)   03/16/16 71.7 kg (158 lb)   01/28/16 71.2 kg (157 lb)   01/18/16 71.2 kg (157 lb)       GEN: alert and oriented x 3, nad  HEENT: perrla, eomi, sclera anicteric, oral mucosa moist without thrush or lesions  NECK: supple, no palpable LAD  HT: reg rate and rhythm, no murmurs  LUNGS: clear to auscultation bilaterally  ABD: soft, nt, nd, +bs x 4  EXT: no clubbing, cyanosis, or edema  NEURO: CN 2-12 grossly intact, MS grossly 5/5 b/l    Current Outpatient Prescriptions   Medication Sig Dispense Refill     ranitidine (ZANTAC) 300 MG tablet Take 1 tablet (300 mg) by mouth At Bedtime 30 tablet 5     mometasone (ASMANEX 30 METERED DOSES) 220 MCG/INH Inhaler Inhale 1 puff into the lungs daily 3 Inhaler 3     clobetasol (TEMOVATE) 0.05 % external solution Apply topically 2 times daily 50 mL 3     ketoconazole (NIZORAL) 2 % cream Apply topically daily To sides of nose and eyebrows for seborrheic dermatitis of the face. 30 g 2     ketoconazole (NIZORAL) 2 % shampoo Apply topically three times a week Alternate with H&S/Selsun Blue, and salicylic acid 120 mL 3     metroNIDAZOLE (METROCREAM) 0.75 % cream Apply topically 2 times daily as needed 45 g 11     albuterol (VENTOLIN HFA) 108 (90 BASE) MCG/ACT inhaler Inhale 2 puffs into the lungs 4 times daily as needed 1 Inhaler 11     calcium carbonate (OS-KAVYA 500 MG [place]. [place]) 500 MG tablet Take by mouth 2 times daily       Glucosamine-Chondroitin  (GLUCOSAMINE CHONDR COMPLEX PO) Take 1,000 mg by mouth       Omega-3 Fatty Acids (FISH OIL) 1200 MG CAPS        Multiple Vitamin (MULTI-VITAMIN PO) Take  by mouth. Occasionally         LABs:   Results for NGA SWEET (MRN [id number]) as of 5/18/2017 07:25   Ref. Range 11/7/2016 10:42 5/16/2017 15:46   Sodium Latest Ref Range: 133 - 144 mmol/L 139 138   Potassium Latest Ref Range: 3.4 - 5.3 mmol/L 4.0 4.0   Chloride Latest Ref Range: 94 - 109 mmol/L 104 104   Carbon Dioxide Latest Ref Range: 20 - 32 mmol/L 29 25   Urea Nitrogen Latest Ref Range: 7 - 30 mg/dL 16 19   Creatinine Latest Ref Range: 0.66 - 1.25 mg/dL 1.05 0.94   GFR Estimate Latest Ref Range: >60 mL/min/1.7m2 73 82   GFR Estimate If Black Latest Ref Range: >60 mL/min/1.7m2 88 >90...   Calcium Latest Ref Range: 8.5 - 10.1 mg/dL 8.2 (L) 8.6   Anion Gap Latest Ref Range: 3 - 14 mmol/L 6 8   Albumin Latest Ref Range: 3.4 - 5.0 g/dL 3.2 (L) 3.8   Protein Total Latest Ref Range: 6.8 - 8.8 g/dL 6.1 (L) 6.9   Bilirubin Total Latest Ref Range: 0.2 - 1.3 mg/dL 0.7 0.5   Alkaline Phosphatase Latest Ref Range: 40 - 150 U/L 67 79   ALT Latest Ref Range: 0 - 70 U/L 19 24   AST Latest Ref Range: 0 - 45 U/L 13 18   TSH Latest Ref Range: 0.40 - 4.00 mU/L 2.74 2.84   Glucose Latest Ref Range: 70 - 99 mg/dL 89 94   WBC Latest Ref Range: 4.0 - 11.0 10e9/L 3.9 (L) 4.6   Hemoglobin Latest Ref Range: 13.3 - 17.7 g/dL 13.0 (L) 14.4   Hematocrit Latest Ref Range: 40.0 - 53.0 % 38.5 (L) 41.9   Platelet Count Latest Ref Range: 150 - 450 10e9/L 166 186   RBC Count Latest Ref Range: 4.4 - 5.9 10e12/L 4.13 (L) 4.56   MCV Latest Ref Range: 78 - 100 fl 93 92   MCH Latest Ref Range: 26.5 - 33.0 pg 31.5 31.6   MCHC Latest Ref Range: 31.5 - 36.5 g/dL 33.8 34.4   RDW Latest Ref Range: 10.0 - 15.0 % 12.6 12.6   Diff Method Unknown Automated Method Automated Method   % Neutrophils Latest Units: % 65.0 59.0   % Lymphocytes Latest Units: % 23.0 26.4   % Monocytes Latest Units: % 9.2 10.9    % Eosinophils Latest Units: % 2.0 3.1   % Basophils Latest Units: % 0.5 0.4   % Immature Granulocytes Latest Units: % 0.3 0.2   Nucleated RBCs Latest Ref Range: 0 /100 0 0   Absolute Neutrophil Latest Ref Range: 1.6 - 8.3 10e9/L 2.5 2.7   Absolute Lymphocytes Latest Ref Range: 0.8 - 5.3 10e9/L 0.9 1.2   Absolute Monocytes Latest Ref Range: 0.0 - 1.3 10e9/L 0.4 0.5   Absolute Eosinophils Latest Ref Range: 0.0 - 0.7 10e9/L 0.1 0.1   Absolute Basophils Latest Ref Range: 0.0 - 0.2 10e9/L 0.0 0.0   Abs Immature Granulocytes Latest Ref Range: 0 - 0.4 10e9/L 0.0 0.0   Absolute Nucleated RBC Unknown 0.0 0.0                 Assessment/Plan  R tonsil cancer [id number] - HPV+ - Jose is doing great. He has not had any evidence of disease on his scans-last in Nov 2016 and is a little over 3 years out from treatment.  He has minimal late toxicity from treatment. He has had some worsening dysphagia over the past several months-it is minimally bothersome at this time and not really limiting. He discussed this at his ENT appt earlier this month. Their exam at that time was unremarkable. They had recommended a videoswallow study, but Jose decided he would like to meet with speech and work on some swallowing exercises first and if he was not seeming to notice any change then would pursue further w/u. He has not really been diligent about doing hte exercises. He will start doing them routinely and let us know if there is no improvement-he again notes this change is quite mild at this time. Otherwise he has ENT f/u in Sept and sees Dr. Romo again in November with annual CT chest and CT neck.      Pulmonary nodules - These are small and stable.  We will continue to follow with yearly surveillance.     TSH - His TSH is normal today.     It is my privilege to be involved in the care of the above patient.     Dana Boston PA-C  [facility] [address] [address]  [phone]

## 2017-08-07 DIAGNOSIS — K21.9 GASTROESOPHAGEAL REFLUX DISEASE WITHOUT ESOPHAGITIS: ICD-10-CM

## 2017-08-07 NOTE — TELEPHONE ENCOUNTER
Routing refill request to provider for review/approval because:  Drug not active on patient's medication list  Rx has       Kiana Chavarria RN  [facility]

## 2017-08-07 NOTE — TELEPHONE ENCOUNTER
ranitidine (ZANTAC) 300 MG tablet      Last Written Prescription Date:  5-3-17  Last Fill Quantity: 30,   # refills: 5  Last Office Visit with [facility], [facility] or [facility] prescribing provider: 8-26-16  Future Office visit:       Routing refill request to provider for review/approval because:  Drug not active on patient's medication list

## 2017-09-06 ENCOUNTER — OFFICE VISIT (OUTPATIENT)
Dept: OTOLARYNGOLOGY | Facility: CLINIC | Age: 57
End: 2017-09-06

## 2017-09-06 VITALS — BODY MASS INDEX: 24.64 KG/M2 | WEIGHT: 157 LBS | HEIGHT: 67 IN

## 2017-09-06 DIAGNOSIS — C09.0 MALIGNANT NEOPLASM OF TONSILLAR FOSSA (H): ICD-10-CM

## 2017-09-06 DIAGNOSIS — R13.19 OTHER DYSPHAGIA: Primary | ICD-10-CM

## 2017-09-06 ASSESSMENT — PAIN SCALES - GENERAL: PAINLEVEL: NO PAIN (0)

## 2017-09-06 NOTE — LETTER
"9/6/2017       RE: Jose Wise  [address]  [facility] [address]     Dear Colleague,    Thank you for referring your patient, Jose Wise, to the [facility] EAR NOSE AND THROAT at [facility]. Please see a copy of my visit note below.    Dear Dr. Alvarez:    I had the pleasure of seeing Jose Wise in follow-up today at the [facility].     History of Present Illness:   Mr Wise is a 57 year old man with a history of a [id number] SCC of the right tonsil. He was treated with concurrent chemoradiation, completed 3/14/2014. His post-treatment PET showed complete treatment response. His last imaging in November 2016 did show several small pulmonary nodules, with the plan for repeat imaging in November 2017. His last TSH was in 5/2017 and was normal.     He was last seen in clinic 5/2017 and was doing well. Since that time he was seen by medical oncology. He continues to have ongoing intermittent issues with his swallowing. He admits to not doing his swallow exercises regularly. He feels that things get \"hung up\" near the back of his throat when he tries to swallow. We had offered him a swallow study in the past but he declined. He is not having any reflux symptoms with use of ranitidine. He denies any sore throat, odynophagia, neck masses, hemoptysis, otalgia. His weight is stable. He is not smoking. He denies any other changes in his general health. He is scheduled for repeat imaging by Dr Romo in 11/2017.      MEDICATIONS:     Current Outpatient Prescriptions   Medication Sig Dispense Refill     ranitidine (ZANTAC) 300 MG tablet Take 1 tablet (300 mg) by mouth At Bedtime 30 tablet 5     mometasone (ASMANEX 30 METERED DOSES) 220 MCG/INH Inhaler Inhale 1 puff into the lungs daily 3 Inhaler 3     clobetasol (TEMOVATE) 0.05 % external solution Apply topically 2 times daily 50 mL 3     ketoconazole (NIZORAL) 2 % cream Apply topically daily " To sides of nose and eyebrows for seborrheic dermatitis of the face. 30 g 2     ketoconazole (NIZORAL) 2 % shampoo Apply topically three times a week Alternate with H&S/Selsun Blue, and salicylic acid 120 mL 3     metroNIDAZOLE (METROCREAM) 0.75 % cream Apply topically 2 times daily as needed 45 g 11     albuterol (VENTOLIN HFA) 108 (90 BASE) MCG/ACT inhaler Inhale 2 puffs into the lungs 4 times daily as needed 1 Inhaler 11     calcium carbonate (OS-KAVYA 500 MG [place]. CA) 500 MG tablet Take by mouth 2 times daily       Glucosamine-Chondroitin (GLUCOSAMINE CHONDR COMPLEX PO) Take 1,000 mg by mouth       Omega-3 Fatty Acids (FISH OIL) 1200 MG CAPS        Multiple Vitamin (MULTI-VITAMIN PO) Take  by mouth. Occasionally           ALLERGIES:    Allergies   Allergen Reactions     Animal Dander Itching     Pet hair causes watery eyes and sneezing     Grass Itching     Red tipped grasses, sneezing and watery eyes       HABITS/SOCIAL HISTORY:     Quit smoking years ago    PAST MEDICAL HISTORY:   Past Medical History:   Diagnosis Date     Asthma      Chemical dependency (H)      Communic dis contact NEC      History of radiation therapy      Hoarseness      Impacted cerumen 10/14/2013     Impaired fasting glucose 9/4/2012     Lateral epicondylitis  of elbow      Pain in joint, upper arm      Pure hypercholesterolemia      Rosacea 12/5/2012     Seasonal allergies      Spasm of muscle      Substance abuse     POLYSUBSTANCE     Tension headache, chronic 10/17/2012     Tinea versicolor 12/5/2012     Tinnitus      Tonsillar cancer (H) 2013    chemo and radiation     Unspecified asthma(493.90)     takes daily inhaler, never been intubated, no attacks  since 2012        FAMILY HISTORY:    Family History   Problem Relation Age of Onset     Circulatory Mother 45     cerebral aneurysm     Alcohol/Drug Mother      sibling     Other - See Comments Mother      aneurysm     Substance Abuse Mother      CANCER Mother       "Cardiovascular Father      Heart Attack     Coronary Artery Disease Father      heart attack/ triple bypass     CANCER Father      skin cancer     Skin Cancer Father      Other Cancer Brother      upper thorax     Substance Abuse Brother      CANCER Sister      CANCER Brother      GASTROINTESTINAL DISEASE Son      crohn's disease/ colostomy     Melanoma No family hx of        REVIEW OF SYSTEMS:  12 point ROS was negative other than the symptoms noted above in the HPI.  Patient Supplied Answers to Review of Systems  [facility] ROS 9/6/2017   Constitutional -   Neurology Dizzy spells, Headache   Ears, Nose, Throat Ringing/noise in ears, Trouble swallowing, Hoarseness   Gastrointestinal/Genitourinary -   Musculoskeletal -   Allergy/Immunology -   Endocrine -         PHYSICAL EXAMINATION:   Ht 1.69 m (5' 6.53\")  Wt 71.2 kg (157 lb)  BMI 24.93 kg/m2  Appearance:   normal; NAD, age-appropriate appearance, well-developed, normal habitus   Communication:   normal; communicates verbally, normal voice quality   Head/Face:   inspection -  Normal; no scars or visible lesions   Salivary glands -  Normal size, no tenderness, swelling, or palpable masses   Facial strength -  Normal and symmetric bilateral; H/B I/VI   Skin:  normal, no rash   Ocular Motility:  normal occular movements   Ears:  auricle (AD) -  normal  EAC (AD) -  normal  TM (AD) -  Normal, no effusion  auricle (AS) -  normal  EAC (AS) -  normal  TM (AS) -  Normal, no effusion  Normal clinical speech reception   Nose:  Ext. inspection -  Normal  Internal Inspection -  Normal mucosa, septum, and turbinates   Nasopharynx:  normal mucosa, no masses   Oral Cavity:  lips -  Normal mucosa, oral competence, and stoma size   Age-appropriate dentition, healthy gingival mucosa   Hard palate, buccal, floor of mouth mucosa normal   Tongue - normal movement, no lesions, normal sensation, no palpable BOT masses   Oropharynx:  mucosa -  Normal, no lesions  soft palate -  Normal, no " lesions, no asymmetry, normal elevation  No palpable masses in BOT or tonsillar fossa   Hypopharynx:  Normal pyriform sinus and pharyngeal wall mucosa   No pooled secretions    Larynx:  Epiglottis, false vocal cord, true vocal cord normal in appearance, bilaterally mobile cords   Neck: No visible mass or asymmetry   thyroid -  Normal   Normal range of motion   Lymphatic:  no abnormal nodes   Cardiovascular: warm, pink, well-perfused extremities without swelling, tenderness, or edema   Respiratory: Normal respiratory effort, no stridor   Neuro/Psych.:  mood/affect -  normal  mental status -  normal  cranial nerves -  normal        PROCEDURES:   Flexible fiberoptic laryngoscopy: Verbal consent was obtained. The nasal cavity was prepped with an aerosolized solution of topical anesthetic and vasoconstrictive agent. The scope was passed through the anterior nasal cavity and advanced. Inspection of the nasopharynx revealed no gross abnormality. Inspection of the larynx revealed bilaterally mobile vocal cords. Pyriform sinuses are symmetric. No intra-arytenoid irregularities noted. The epiglottis, aryepiglottic folds, vallecula and base of tongue are unremarkable. There are radiation changes to the mucosa. The airway is patent. Procedure tolerated well with no immediate complications noted.    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 He has no evidence of recurrence at this time. We will plan on repeat imaging in November 2017 for his small pulmonary nodules. He is amenable to the swallow study if we can coordinate it with his other imaging in November which we will attempt to facilitate. We will perform an esophagram with the video swallow to ensure no strictures, webs, or reflux. I will plan on seeing him back in 6 months unless his swallow study shows findings indicating need for intervention.    Thank you very much for the opportunity to participate in the  care of your patient.      Yazmin Bundy M.D.  Otolaryngology- Head & Neck Surgery      CC:  Janes Romo   of Medicine  Division of Hematology, Oncology, and Transplantation    Valery Carreon MD  Department of Radiation Oncology  [facility]

## 2017-09-06 NOTE — MR AVS SNAPSHOT
After Visit Summary   9/6/2017    Jose Wise    MRN: [id number]           Patient Information     Date Of Birth          1960        Visit Information        Provider Department      9/6/2017 4:20 PM Yazmin Bundy MD [facility]        Today's Diagnoses     Other dysphagia    -  1    MALIG NEOPLASM TONSILLAR FOSSA           Follow-ups after your visit        Additional Services     Speech Therapy Referral       *This order will print in the [facility] Central Scheduling Office*    [facility] provides Speech Therapy evaluation and treatment and many specialty services across the [facility] system.  If requesting a specialty program, please choose from the list below.    Call [phone] to schedule [facility] at all locations, with the exception of [facility], please call [phone].     Treatment: Evaluation & Treatment  Speech Treatment Diagnosis: Dysphagia  Special Instructions: To be done with [facility]  Special Programs: Video Swallow Study    Please be aware that coverage of these services is subject to the terms and limitations of your health insurance plan.  Call member services at your health plan with any benefit or coverage questions.      **Note to Provider** To refer patients to therapy outside of the location list, change the order class to External Referral in the order composer.                  Your next 10 appointments already scheduled     Oct 16, 2017  8:00 AM CDT   MyChart Physical Adult with Nicolás Morejon MD   [facility] ([facility])    [address] [place] [id number]   [phone]            Nov 14, 2017  8:20 AM CST   (Arrive by 8:05 AM)   CT SOFT TISSUE NECK W CONTRAST with UCCT1   [facility] ([facility])    [address]  [id number] [phone]           Please bring any scans or X-rays taken at other hospitals, if similar tests were done. Also bring a list of your medicines, including vitamins, minerals and over-the-counter drugs. It is safest to leave personal items at home.  Be sure to tell your doctor:   If you have any allergies.   If there s any chance you are pregnant.   If you are breastfeeding.   If you have any special needs.  You will have contrast for this exam. To prepare:   Do not eat or drink for 2 hours before your exam. If you need to take medicine, you may take it with small sips of water. (We may ask you to take liquid medicine as well.)   The day before your exam, drink extra fluids at least six 8-ounce glasses (unless your doctor tells you to restrict your fluids).  Patients over 70 or patients with diabetes or kidney problems:   If you haven t had a blood test (creatinine test) within the last 30 days, go to your clinic or Diagnostic Imaging Department for this test.  If you have diabetes:   If your kidney function is normal, continue taking your metformin (Avandamet, Glucophage, Glucovance, Metaglip) on the day of your exam.   If your kidney function is abnormal, wait 48 hours before restarting this medicine.  Please wear loose clothing, such as a sweat suit or jogging clothes. Avoid snaps, zippers and other metal. We may ask you to undress and put on a hospital gown.  If you have any questions, please call the Imaging Department where you will have your exam.            Nov 14, 2017  8:40 AM CST   (Arrive by 8:25 AM)   CT CHEST W CONTRAST with UCCT1   [facility] ([facility])    [address] [facility]  1st Floor  [facility] [id number] [phone]           Please bring any scans or X-rays taken at other hospitals, if similar tests were done. Also bring a list of your medicines, including vitamins, minerals and over-the-counter drugs. It is safest to leave  personal items at home.  Be sure to tell your doctor:   If you have any allergies.   If there s any chance you are pregnant.   If you are breastfeeding.   If you have any special needs.  You will have contrast for this exam. To prepare:   Do not eat or drink for 2 hours before your exam. If you need to take medicine, you may take it with small sips of water. (We may ask you to take liquid medicine as well.)   The day before your exam, drink extra fluids at least six 8-ounce glasses (unless your doctor tells you to restrict your fluids).  Patients over 70 or patients with diabetes or kidney problems:   If you haven t had a blood test (creatinine test) within the last 30 days, go to your clinic or Diagnostic Imaging Department for this test.  If you have diabetes:   If your kidney function is normal, continue taking your metformin (Avandamet, Glucophage, Glucovance, Metaglip) on the day of your exam.   If your kidney function is abnormal, wait 48 hours before restarting this medicine.  Please wear loose clothing, such as a sweat suit or jogging clothes. Avoid snaps, zippers and other metal. We may ask you to undress and put on a hospital gown.  If you have any questions, please call the Imaging Department where you will have your exam.            Nov 14, 2017  9:00 AM CST   Video Swallow with GRANT Garcia   [facility] ([facility])    [address] [place] [address]  4th [facility] [address] [phone]           Please check in for this visit in Imaging on the 1st floor.            Nov 14, 2017  9:00 AM CST   XR VIDEO SPEECH EVALUATION WITH ESOPHAGRAM with UCXR2, UC GIGU RAD   [facility] [place] [facility] ([facility])    [address] [place] [address]  1st [facility] [address] [phone]           Please bring a list of your current medicines to your exam. (Include vitamins, minerals and over-the-counter medicines.) Leave your valuables at home.   Tell the doctor if there is a chance you could be pregnant.  Do not eat for 4 hours before the exam. Keep drinking clear liquids until 2 hours before the exam.  You may take pain medicine (with a sip of water) up to 4 hours before the exam.  Do not swallow any other medicines unless your doctor tells you to. Talk to your doctor to be sure it s safe to stop your medicines.  Please call the Imaging Department at your exam site with any questions.            Nov 14, 2017  3:00 PM CST   (Arrive by 2:45 PM)   Return Visit with Janes Romo DO   [facility] ([facility])    [address] [place] [address]  2nd Floor  [facility] [address] [phone]            Mar 07, 2018  4:20 PM CST   (Arrive by 4:05 PM)   Return Visit with Yazmin Bundy MD   [facility] ([facility])    [address] [facility]  4th Floor  [facility] [address] [phone]              Future tests that were ordered for you today     Open Future Orders        Priority Expected Expires Ordered    XR Video Swallow w Esophagram - Order with Speech Therapy Referral Routine 9/6/2017 9/6/2018 9/6/2017            Who to contact     Please call your clinic at [phone] to:    Ask questions about your health    Make or cancel appointments    Discuss your medicines    Learn about your test results    Speak to your doctor   If you have compliments or concerns about an experience at your clinic, or if you wish to file a complaint, please contact [facility] Patient Relations at [phone] or email us at [email or web address]         Additional Information About Your Visit        MyChart Information     [facility] gives you secure access to your electronic health record. If you see a primary care provider, you can also send messages to your care team and make appointments. If you have questions, please call your primary care  "clinic.  If you do not have a primary care provider, please call [phone] and they will assist you.      [employer] is an electronic gateway that provides easy, online access to your medical records. With [employer], you can request a clinic appointment, read your test results, renew a prescription or communicate with your care team.     To access your existing account, please contact your [facility] or call [phone] for assistance.        Care EveryWhere ID     This is your Care EveryWhere ID. This could be used by other organizations to access your [facility] medical records  [id number]        Your Vitals Were     Height BMI (Body Mass Index)                1.69 m (5' 6.53\") 24.93 kg/m2           Blood Pressure from Last 3 Encounters:   05/16/17 123/85   02/01/17 132/81   11/08/16 132/87    Weight from Last 3 Encounters:   09/06/17 71.2 kg (157 lb)   05/16/17 71.9 kg (158 lb 8 oz)   05/03/17 72.1 kg (159 lb)              We Performed the Following     Speech Therapy Referral        Primary Care Provider Office Phone # Fax #    Nicolás Morejon [phone]       [address] [facility] [address]        Equal Access to Services     ANUJA STEWART : Hadii aad ku hadasho Soomaali, waaxda luqadaha, qaybta kaalmada adeegyada, waxay michellein hayget duron. So [facility] [phone].    ATENCIÓN: Si habla español, tiene a william disposición servicios gratuitos de asistencia lingüística. Llcolleen al [phone].    We comply with applicable federal civil rights laws and [place] laws. We do not discriminate on the basis of race, color, national origin, age, disability sex, sexual orientation or gender identity.            Thank you!     Thank you for choosing [facility] EAR NOSE AND THROAT  for your care. Our goal is always to provide you with excellent care. Hearing back from our patients is one way we can continue to improve our services. Please take a few " minutes to complete the written survey that you may receive in the mail after your visit with us. Thank you!             Your Updated Medication List - Protect others around you: Learn how to safely use, store and throw away your medicines at www.disposemymeds.org.          This list is accurate as of: 9/6/17 11:59 PM.  Always use your most recent med list.                   Brand Name Dispense Instructions for use Diagnosis    albuterol 108 (90 BASE) MCG/ACT Inhaler    VENTOLIN HFA    1 Inhaler    Inhale 2 puffs into the lungs 4 times daily as needed    Intermittent asthma, uncomplicated       calcium carbonate 1250 MG tablet    OS-KAVYA 500 mg [place]. Ca     Take by mouth 2 times daily        clobetasol 0.05 % external solution    TEMOVATE    50 mL    Apply topically 2 times daily    Seborrheic dermatitis       Fish Oil 1200 MG Caps           GLUCOSAMINE CHONDR COMPLEX PO      Take 1,000 mg by mouth        * ketoconazole 2 % shampoo    NIZORAL    120 mL    Apply topically three times a week Alternate with H&S/Selsun Blue, and salicylic acid    Seborrheic dermatitis       * ketoconazole 2 % cream    NIZORAL    30 g    Apply topically daily To sides of nose and eyebrows for seborrheic dermatitis of the face.    Sebopsoriasis       metroNIDAZOLE 0.75 % cream    METROCREAM    45 g    Apply topically 2 times daily as needed    Rosacea       mometasone 220 MCG/INH Inhaler    ASMANEX 30 METERED DOSES    3 Inhaler    Inhale 1 puff into the lungs daily    Intermittent asthma, uncomplicated       MULTI-VITAMIN PO      Take  by mouth. Occasionally        ranitidine 300 MG tablet    ZANTAC    30 tablet    Take 1 tablet (300 mg) by mouth At Bedtime    Gastroesophageal reflux disease without esophagitis       * Notice:  This list has 2 medication(s) that are the same as other medications prescribed for you. Read the directions carefully, and ask your doctor or other care provider to review them with you.

## 2017-09-06 NOTE — PROGRESS NOTES
"Dear Dr. Alvarez:    I had the pleasure of seeing Jose Wise in follow-up today at the [facility].     History of Present Illness:   Mr Wise is a 57 year old man with a history of a [id number] SCC of the right tonsil. He was treated with concurrent chemoradiation, completed 3/14/2014. His post-treatment PET showed complete treatment response. His last imaging in November 2016 did show several small pulmonary nodules, with the plan for repeat imaging in November 2017. His last TSH was in 5/2017 and was normal.     He was last seen in clinic 5/2017 and was doing well. Since that time he was seen by medical oncology. He continues to have ongoing intermittent issues with his swallowing. He admits to not doing his swallow exercises regularly. He feels that things get \"hung up\" near the back of his throat when he tries to swallow. We had offered him a swallow study in the past but he declined. He is not having any reflux symptoms with use of ranitidine. He denies any sore throat, odynophagia, neck masses, hemoptysis, otalgia. His weight is stable. He is not smoking. He denies any other changes in his general health. He is scheduled for repeat imaging by Dr Romo in 11/2017.      MEDICATIONS:     Current Outpatient Prescriptions   Medication Sig Dispense Refill     ranitidine (ZANTAC) 300 MG tablet Take 1 tablet (300 mg) by mouth At Bedtime 30 tablet 5     mometasone (ASMANEX 30 METERED DOSES) 220 MCG/INH Inhaler Inhale 1 puff into the lungs daily 3 Inhaler 3     clobetasol (TEMOVATE) 0.05 % external solution Apply topically 2 times daily 50 mL 3     ketoconazole (NIZORAL) 2 % cream Apply topically daily To sides of nose and eyebrows for seborrheic dermatitis of the face. 30 g 2     ketoconazole (NIZORAL) 2 % shampoo Apply topically three times a week Alternate with H&S/Selsun Blue, and salicylic acid 120 mL 3     metroNIDAZOLE (METROCREAM) 0.75 % cream Apply topically 2 times daily " as needed 45 g 11     albuterol (VENTOLIN HFA) 108 (90 BASE) MCG/ACT inhaler Inhale 2 puffs into the lungs 4 times daily as needed 1 Inhaler 11     calcium carbonate (OS-KAVYA 500 MG [place]. [place]) 500 MG tablet Take by mouth 2 times daily       Glucosamine-Chondroitin (GLUCOSAMINE CHONDR COMPLEX PO) Take 1,000 mg by mouth       Omega-3 Fatty Acids (FISH OIL) 1200 MG CAPS        Multiple Vitamin (MULTI-VITAMIN PO) Take  by mouth. Occasionally           ALLERGIES:    Allergies   Allergen Reactions     Animal Dander Itching     Pet hair causes watery eyes and sneezing     Grass Itching     Red tipped grasses, sneezing and watery eyes       HABITS/SOCIAL HISTORY:     Quit smoking years ago    PAST MEDICAL HISTORY:   Past Medical History:   Diagnosis Date     Asthma      Chemical dependency (H)      Communic dis contact [facility]EC      History of radiation therapy      Hoarseness      Impacted cerumen 10/14/2013     Impaired fasting glucose 9/4/2012     Lateral epicondylitis  of elbow      Pain in joint, upper arm      Pure hypercholesterolemia      Rosacea 12/5/2012     Seasonal allergies      Spasm of muscle      Substance abuse     POLYSUBSTANCE     Tension headache, chronic 10/17/2012     Tinea versicolor 12/5/2012     Tinnitus      Tonsillar cancer (H) 2013    chemo and radiation     Unspecified asthma(493.90)     takes daily inhaler, never been intubated, no attacks  since 2012        FAMILY HISTORY:    Family History   Problem Relation Age of Onset     Circulatory Mother 45     cerebral aneurysm     Alcohol/Drug Mother      sibling     Other - See Comments Mother      aneurysm     Substance Abuse Mother      CANCER Mother      Cardiovascular Father      Heart Attack     Coronary Artery Disease Father      heart attack/ triple bypass     CANCER Father      skin cancer     Skin Cancer Father      Other Cancer Brother      upper thorax     Substance Abuse Brother      CANCER Sister      CANCER Brother       "GASTROINTESTINAL DISEASE Son      crohn's disease/ colostomy     Melanoma No family hx of        REVIEW OF SYSTEMS:  12 point ROS was negative other than the symptoms noted above in the HPI.  Patient Supplied Answers to Review of Systems  [facility] E[facility] ROS 9/6/2017   Constitutional -   Neurology Dizzy spells, Headache   Ears, Nose, Throat Ringing/noise in ears, Trouble swallowing, Hoarseness   Gastrointestinal/Genitourinary -   Musculoskeletal -   Allergy/Immunology -   Endocrine -         PHYSICAL EXAMINATION:   Ht 1.69 m (5' 6.53\")  Wt 71.2 kg (157 lb)  BMI 24.93 kg/m2  Appearance:   normal; NAD, age-appropriate appearance, well-developed, normal habitus   Communication:   normal; communicates verbally, normal voice quality   Head/Face:   inspection -  Normal; no scars or visible lesions   Salivary glands -  Normal size, no tenderness, swelling, or palpable masses   Facial strength -  Normal and symmetric bilateral; H/B I/VI   Skin:  normal, no rash   Ocular Motility:  normal occular movements   Ears:  auricle (AD) -  normal  EAC (AD) -  normal  TM (AD) -  Normal, no effusion  auricle (AS) -  normal  EAC (AS) -  normal  TM (AS) -  Normal, no effusion  Normal clinical speech reception   Nose:  Ext. inspection -  Normal  Internal Inspection -  Normal mucosa, septum, and turbinates   Nasopharynx:  normal mucosa, no masses   Oral Cavity:  lips -  Normal mucosa, oral competence, and stoma size   Age-appropriate dentition, healthy gingival mucosa   Hard palate, buccal, floor of mouth mucosa normal   Tongue - normal movement, no lesions, normal sensation, no palpable BOT masses   Oropharynx:  mucosa -  Normal, no lesions  soft palate -  Normal, no lesions, no asymmetry, normal elevation  No palpable masses in BOT or tonsillar fossa   Hypopharynx:  Normal pyriform sinus and pharyngeal wall mucosa   No pooled secretions    Larynx:  Epiglottis, false vocal cord, true vocal cord normal in appearance, bilaterally mobile cords "   Neck: No visible mass or asymmetry   thyroid -  Normal   Normal range of motion   Lymphatic:  no abnormal nodes   Cardiovascular: warm, pink, well-perfused extremities without swelling, tenderness, or edema   Respiratory: Normal respiratory effort, no stridor   Neuro/Psych.:  mood/affect -  normal  mental status -  normal  cranial nerves -  normal        PROCEDURES:   Flexible fiberoptic laryngoscopy: Verbal consent was obtained. The nasal cavity was prepped with an aerosolized solution of topical anesthetic and vasoconstrictive agent. The scope was passed through the anterior nasal cavity and advanced. Inspection of the nasopharynx revealed no gross abnormality. Inspection of the larynx revealed bilaterally mobile vocal cords. Pyriform sinuses are symmetric. No intra-arytenoid irregularities noted. The epiglottis, aryepiglottic folds, vallecula and base of tongue are unremarkable. There are radiation changes to the mucosa. The airway is patent. Procedure tolerated well with no immediate complications noted.    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 He has no evidence of recurrence at this time. We will plan on repeat imaging in November 2017 for his small pulmonary nodules. He is amenable to the swallow study if we can coordinate it with his other imaging in November which we will attempt to facilitate. We will perform an esophagram with the video swallow to ensure no strictures, webs, or reflux. I will plan on seeing him back in 6 months unless his swallow study shows findings indicating need for intervention.    Thank you very much for the opportunity to participate in the care of your patient.      Yazmin Bundy M.D.  Otolaryngology- Head & Neck Surgery      CC:  Janes Romo   of Medicine  Division of Hematology, Oncology, and Transplantation    Valery Carreon MD  Department of Radiation Oncology  [facility]  [facility]

## 2017-09-25 ENCOUNTER — MYC MEDICAL ADVICE (OUTPATIENT)
Dept: FAMILY MEDICINE | Facility: CLINIC | Age: 57
End: 2017-09-25

## 2017-09-25 ENCOUNTER — OFFICE VISIT (OUTPATIENT)
Dept: FAMILY MEDICINE | Facility: CLINIC | Age: 57
End: 2017-09-25
Payer: COMMERCIAL

## 2017-09-25 VITALS
DIASTOLIC BLOOD PRESSURE: 68 MMHG | OXYGEN SATURATION: 99 % | SYSTOLIC BLOOD PRESSURE: 111 MMHG | HEART RATE: 67 BPM | WEIGHT: 158 LBS | TEMPERATURE: 98.1 F | BODY MASS INDEX: 25.09 KG/M2

## 2017-09-25 DIAGNOSIS — H81.12 BPPV (BENIGN PAROXYSMAL POSITIONAL VERTIGO), LEFT: Primary | ICD-10-CM

## 2017-09-25 DIAGNOSIS — J45.20 INTERMITTENT ASTHMA, UNCOMPLICATED: ICD-10-CM

## 2017-09-25 PROCEDURE — 99213 OFFICE O/P EST LOW 20 MIN: CPT | Performed by: FAMILY MEDICINE

## 2017-09-25 ASSESSMENT — PAIN SCALES - GENERAL: PAINLEVEL: NO PAIN (0)

## 2017-09-25 NOTE — TELEPHONE ENCOUNTER
Routing to PCP to review and advise.  Scheduled appointment 11:20 am today for vitrigo started Sunday, does have some ear pain, no temp, no stiff neck    Adelaide Arnold RN  [facility]

## 2017-09-25 NOTE — LETTER
[address] [address] [phone]              2017    To whom it may concern:    Jose Wise (  [birth year] ) was seen here in clinic today.  He missed work for medical reasons but should be okay to return to work tomorrow.            Sincerely,                    Nicolás Mroejon MD

## 2017-09-25 NOTE — LETTER
My Asthma Action Plan  Name: Jose Wise   YOB: 1960  Date: 9/25/2017   My doctor: Nicolás Morejon MD   My clinic: [facility]        My Control Medicine: Mometasone (Asmanex) -  Twisthaler 220 mcg    My Rescue Medicine: Albuterol (Proair/Ventolin/Proventil) inhaler     My Asthma Severity: intermittent  Avoid your asthma triggers: see list                GREEN ZONE   Good Control    I feel good    No cough or wheeze    Can work, sleep and play without asthma symptoms       Take your asthma control medicine every day.     1. If exercise triggers your asthma, take your rescue medication    15 minutes before exercise or sports, and    During exercise if you have asthma symptoms  2. Spacer to use with inhaler: If you have a spacer, make sure to use it with your inhaler             YELLOW ZONE Getting Worse  I have ANY of these:    I do not feel good    Cough or wheeze    Chest feels tight    Wake up at night   1. Keep taking your Green Zone medications  2. Start taking your rescue medicine:    every 20 minutes for up to 1 hour. Then every 4 hours for 24-48 hours.  3. If you stay in the Yellow Zone for more than 12-24 hours, contact your doctor.  4. If you do not return to the Green Zone in 12-24 hours or you get worse, start taking your oral steroid medicine if prescribed by your provider.           RED ZONE Medical Alert - Get Help  I have ANY of these:    I feel awful    Medicine is not helping    Breathing getting harder    Trouble walking or talking    Nose opens wide to breathe       1. Take your rescue medicine NOW  2. If your provider has prescribed an oral steroid medicine, start taking it NOW  3. Call your doctor NOW  4. If you are still in the Red Zone after 20 minutes and you have not reached your doctor:    Take your rescue medicine again and    Call 911 or go to the emergency room right away    See your regular doctor within 2 weeks of an Emergency Room or Urgent  Care visit for follow-up treatment.        Electronically signed by: Nicolás Morejon, September 25, 2017    Annual Reminders:  Meet with Asthma Educator,  Flu Shot in the Fall, consider Pneumonia Vaccination for patients with asthma (aged 19 and older).    Pharmacy:    "[employer] Simple[employer] Fast and [employer]ant Tablet [employer] App" - A MAIL ORDER [facility]  [id number] [place] - [address] [address] [facility]  [facility] [place], [place] - [address] [facility] - [place], [place] - [address] [facility] [place] - [place], [place] - [address] [facility]                    Asthma Triggers  How To Control Things That Make Your Asthma Worse    Triggers are things that make your asthma worse.  Look at the list below to help you find your triggers and what you can do about them.  You can help prevent asthma flare-ups by staying away from your triggers.      Trigger                                                          What you can do   Cigarette Smoke  Tobacco smoke can make asthma worse. Do not allow smoking in your home, car or around you.  Be sure no one smokes at a child s day care or school.  If you smoke, ask your health care provider for ways to help you quit.  Ask family members to quit too.  Ask your health care provider for a referral to Quit Plan to help you quit smoking, or call 3-064-991-PLAN.     Colds, Flu, Bronchitis  These are common triggers of asthma. Wash your hands often.  Don t touch your eyes, nose or mouth.  Get a flu shot every year.     Dust Mites  These are tiny bugs that live in cloth or carpet. They are too small to see. Wash sheets and blankets in hot water every week.   Encase pillows and mattress in dust mite proof covers.  Avoid having carpet if you can. If you have carpet, vacuum weekly.   Use a dust mask and HEPA vacuum.   Pollen and Outdoor Mold  Some people are allergic to trees, grass, or weed pollen, or molds. Try to keep your windows closed.  Limit time out doors  when pollen count is high.   Ask you health care provider about taking medicine during allergy season.     Animal Dander  Some people are allergic to skin flakes, urine or saliva from pets with fur or feathers. Keep pets with fur or feathers out of your home.    If you can t keep the pet outdoors, then keep the pet out of your bedroom.  Keep the bedroom door closed.  Keep pets off cloth furniture and away from stuffed toys.     Mice, Rats, and Cockroaches  Some people are allergic to the waste from these pests.   Cover food and garbage.  Clean up spills and food crumbs.  Store grease in the refrigerator.   Keep food out of the bedroom.   Indoor Mold  This can be a trigger if your home has high moisture. Fix leaking faucets, pipes, or other sources of water.   Clean moldy surfaces.  Dehumidify basement if it is damp and smelly.   Smoke, Strong Odors, and Sprays  These can reduce air quality. Stay away from strong odors and sprays, such as perfume, powder, hair spray, paints, smoke incense, paint, cleaning products, candles and new carpet.   Exercise or Sports  Some people with asthma have this trigger. Be active!  Ask your doctor about taking medicine before sports or exercise to prevent symptoms.    Warm up for 5-10 minutes before and after sports or exercise.     Other Triggers of Asthma  Cold air:  Cover your nose and mouth with a scarf.  Sometimes laughing or crying can be a trigger.  Some medicines and food can trigger asthma.

## 2017-09-25 NOTE — MR AVS SNAPSHOT
After Visit Summary   9/25/2017    Jose Wise    MRN: [id number]           Patient Information     Date Of Birth          1960        Visit Information        Provider Department      9/25/2017 11:20 AM Nicolás Morejon MD [facility]        Today's Diagnoses     BPPV (benign paroxysmal positional vertigo), left    -  1    Intermittent asthma, uncomplicated          Care Instructions    Stay well hydrated    Monitor symptoms    Return to clinic / call as needed             Follow-ups after your visit        Your next 10 appointments already scheduled     Oct 16, 2017  8:00 AM CDT   MyChart Physical Adult with Nicolás Morejon MD   [facility] ([facility])    [address] [facility] [address] [facility] [address]   [phone]            Nov 14, 2017  8:20 AM CST   (Arrive by 8:05 AM)   CT SOFT TISSUE NECK W CONTRAST with UCCT1   [facility] ([facility])    [address] [address] [phone]           Please bring any scans or X-rays taken at other hospitals, if similar tests were done. Also bring a list of your medicines, including vitamins, minerals and over-the-counter drugs. It is safest to leave personal items at home.  Be sure to tell your doctor:   If you have any allergies.   If there s any chance you are pregnant.   If you are breastfeeding.   If you have any special needs.  You will have contrast for this exam. To prepare:   Do not eat or drink for 2 hours before your exam. If you need to take medicine, you may take it with small sips of water. (We may ask you to take liquid medicine as well.)   The day before your exam, drink extra fluids at least six 8-ounce glasses (unless your doctor tells you to restrict your fluids).  Patients over 70 or patients with diabetes or kidney problems:   If you haven t had a blood test (creatinine  test) within the last 30 days, go to your clinic or Diagnostic Imaging Department for this test.  If you have diabetes:   If your kidney function is normal, continue taking your metformin (Avandamet, Glucophage, Glucovance, Metaglip) on the day of your exam.   If your kidney function is abnormal, wait 48 hours before restarting this medicine.  Please wear loose clothing, such as a sweat suit or jogging clothes. Avoid snaps, zippers and other metal. We may ask you to undress and put on a hospital gown.  If you have any questions, please call the Imaging Department where you will have your exam.            Nov 14, 2017  8:40 AM CST   (Arrive by 8:25 AM)   CT CHEST W CONTRAST with UCCT1   [facility] [place] [facility] ([facility])    [address] [address] [phone]           Please bring any scans or X-rays taken at other hospitals, if similar tests were done. Also bring a list of your medicines, including vitamins, minerals and over-the-counter drugs. It is safest to leave personal items at home.  Be sure to tell your doctor:   If you have any allergies.   If there s any chance you are pregnant.   If you are breastfeeding.   If you have any special needs.  You will have contrast for this exam. To prepare:   Do not eat or drink for 2 hours before your exam. If you need to take medicine, you may take it with small sips of water. (We may ask you to take liquid medicine as well.)   The day before your exam, drink extra fluids at least six 8-ounce glasses (unless your doctor tells you to restrict your fluids).  Patients over 70 or patients with diabetes or kidney problems:   If you haven t had a blood test (creatinine test) within the last 30 days, go to your clinic or Diagnostic Imaging Department for this test.  If you have diabetes:   If your kidney function is normal, continue taking your metformin (Avandamet, Glucophage, Glucovance, Metaglip) on the day  of your exam.   If your kidney function is abnormal, wait 48 hours before restarting this medicine.  Please wear loose clothing, such as a sweat suit or jogging clothes. Avoid snaps, zippers and other metal. We may ask you to undress and put on a hospital gown.  If you have any questions, please call the Imaging Department where you will have your exam.            Nov 14, 2017  9:00 AM CST   Video Swallow with GRANT Sun   [facility] ([facility])    [address] [place] [address]  4th Floor  [facility] [address] [phone]           Please check in for this visit in Imaging on the 1st floor.            Nov 14, 2017  9:00 AM CST   XR VIDEO SPEECH EVALUATION WITH ESOPHAGRAM with UCXR2 UC GIGU RAD   [facility] ([facility])    [address] [place] [address]  1st [facility] [address] [phone]           Please bring a list of your current medicines to your exam. (Include vitamins, minerals and over-the-counter medicines.) Leave your valuables at home.  Tell the doctor if there is a chance you could be pregnant.  Do not eat for 4 hours before the exam. Keep drinking clear liquids until 2 hours before the exam.  You may take pain medicine (with a sip of water) up to 4 hours before the exam.  Do not swallow any other medicines unless your doctor tells you to. Talk to your doctor to be sure it s safe to stop your medicines.  Please call the Imaging Department at your exam site with any questions.            Nov 14, 2017  3:00 PM CST   (Arrive by 2:45 PM)   Return Visit with Janes Romo DO   [facility] ([facility])    [address] [place] [address]  2nd Floor  [facility] [address] [phone]            Mar 07, 2018  4:20 PM CST   (Arrive by 4:05 PM)   Return Visit with Yazmin Bundy MD   [facility] ([facility])    [address] [place] [address]  [facility] [address] [phone]              Who to contact     If you have questions or need follow up information about today's clinic visit or your schedule please contact [facility] directly at [phone].  Normal or non-critical lab and imaging results will be communicated to you by MyChart, letter or phone within 4 business days after the clinic has received the results. If you do not hear from us within 7 days, please contact the clinic through MyChart or phone. If you have a critical or abnormal lab result, we will notify you by phone as soon as possible.  Submit refill requests through RE2 or call your pharmacy and they will forward the refill request to us. Please allow 3 business days for your refill to be completed.          Additional Information About Your Visit        [facility] Information     RE2 gives you secure access to your electronic health record. If you see a primary care provider, you can also send messages to your care team and make appointments. If you have questions, please call your primary care clinic.  If you do not have a primary care provider, please call [phone] and they will assist you.        Care EveryWhere ID     This is your Care EveryWhere ID. This could be used by other organizations to access your [facility] medical records  [id number]        Your Vitals Were     Pulse Temperature Pulse Oximetry BMI (Body Mass Index)          67 98.1  F (36.7  C) (Oral) 99% 25.09 kg/m2         Blood Pressure from Last 3 Encounters:   09/25/17 111/68   05/16/17 123/85   02/01/17 132/81    Weight from Last 3 Encounters:   09/25/17 158 lb (71.7 kg)   09/06/17 157 lb (71.2 kg)   05/16/17 158 lb 8 oz (71.9 kg)              We Performed the Following     Asthma Action Plan (AAP)        Primary Care Provider Office Phone # Fax #    Nicolás Morejon [phone]       [address] [place], [place]. \Hospital Has a New Name and Outlook.\"" [id number]        Equal Access  to Services     ANUJA STEWART : Justin Bryan, wasascha ramirez, qajermantiera kachristenopal santiago. So [facility] [phone].    ATENCIÓN: Si habla wanda, tiene a william disposición servicios gratuitos de asistencia lingüística. Llame al [phone].    We comply with applicable federal civil rights laws and [place] laws. We do not discriminate on the basis of race, color, national origin, age, disability sex, sexual orientation or gender identity.            Thank you!     Thank you for choosing [facility]  for your care. Our goal is always to provide you with excellent care. Hearing back from our patients is one way we can continue to improve our services. Please take a few minutes to complete the written survey that you may receive in the mail after your visit with us. Thank you!             Your Updated Medication List - Protect others around you: Learn how to safely use, store and throw away your medicines at www.disposemymeds.org.          This list is accurate as of: 9/25/17 11:48 AM.  Always use your most recent med list.                   Brand Name Dispense Instructions for use Diagnosis    albuterol 108 (90 BASE) MCG/ACT Inhaler    VENTOLIN HFA    1 Inhaler    Inhale 2 puffs into the lungs 4 times daily as needed    Intermittent asthma, uncomplicated       calcium carbonate 1250 MG tablet    OS-KAVYA 500 mg [place]. [place]     Take by mouth 2 times daily        Fish Oil 1200 MG Caps           GLUCOSAMINE CHONDR COMPLEX PO      Take 1,000 mg by mouth        metroNIDAZOLE 0.75 % cream    METROCREAM    45 g    Apply topically 2 times daily as needed    Rosacea       mometasone 220 MCG/INH Inhaler    ASMANEX 30 METERED DOSES    3 Inhaler    Inhale 1 puff into the lungs daily    Intermittent asthma, uncomplicated       MULTI-VITAMIN PO      Take  by mouth. Occasionally        ranitidine 300 MG tablet    ZANTAC    30 tablet    Take 1 tablet (300 mg) by  mouth At Bedtime    Gastroesophageal reflux disease without esophagitis

## 2017-09-25 NOTE — NURSING NOTE
"Chief Complaint   Patient presents with     Dizziness     Health Maintenance     ACT       Initial /68 (BP Location: Left arm, Patient Position: Chair, Cuff Size: Adult Regular)  Pulse 67  Temp 98.1  F (36.7  C) (Oral)  Wt 158 lb (71.7 kg)  SpO2 99%  BMI 25.09 kg/m2 Estimated body mass index is 25.09 kg/(m^2) as calculated from the following:    Height as of 9/6/17: 5' 6.53\" (1.69 m).    Weight as of this encounter: 158 lb (71.7 kg).  Medication Reconciliation: complete   Hemalatha Parikh CMA      "

## 2017-09-25 NOTE — PROGRESS NOTES
SUBJECTIVE:   Jose Wise is a 57 year old male who presents to clinic today for the following health issues:       Dizziness  Onset: yesterday morning    Description:   Do you feel faint:  no   Does it feel like the surroundings (bed, room) are moving: YES  Unsteady/off balance: YES  Have you passed out or fallen: no     Intensity: mild    Progression of Symptoms:  same    Accompanying Signs & Symptoms:  Heart palpitations: no   Nausea, vomiting: YES- nausea  Weakness in arms or legs: no   Fatigue: YES  Vision or speech changes: no   Ringing in ears (Tinnitus): YES  Hearing Loss: no     History:   Head trauma/concussion hx: no   Previous similar symptoms: no   Recent bleeding history: no     Precipitating factors:   Worse with activity or head movement: YES  Any new medications (BP?): no   Alcohol/drug abuse/withdrawal: no     Alleviating factors:   Does staying in a fixed position give relief:  YES    Therapies Tried and outcome: none      none    Problem list and histories reviewed & adjusted, as indicated.  Additional history: as documented         [facility]  Meds  Problems       Reviewed and updated as needed this visit by Provider          woke patient up about 5 am yest    Had to grab hold of bed    Room was spinning    Sat up, better    Then layed down, okay    Went to left side, worse    Sat down on couch, went to sleep    Fine during the day    Most of last night bothering     Not much sleep last night    Drank a lot of water yest     Day before yest was out side, mowing etc     No confusion/ speech problems    In 2009 had something similar      for work, so not real active      Physical Exam   Constitutional: He is oriented to person, place, and time and well-developed, well-nourished, and in no distress. No distress.   HENT:   Head: Normocephalic and atraumatic.   Right Ear: Tympanic membrane, external ear and ear  canal normal.   Left Ear: Tympanic membrane, external ear and ear canal normal.   Nose: Nose normal.   Mouth/Throat: Oropharynx is clear and moist.   Eyes: Conjunctivae are normal.   Neck: Neck supple. Carotid bruit is not present.   Cardiovascular: Normal rate, regular rhythm, normal heart sounds and intact distal pulses.  Exam reveals no gallop and no friction rub.    No murmur heard.  Pulmonary/Chest: Effort normal and breath sounds normal. No respiratory distress. He has no wheezes. He has no rales.   Musculoskeletal: He exhibits no edema.   Lymphadenopathy:     He has no cervical adenopathy.   Neurological: He is alert and oriented to person, place, and time.   Skin: He is not diaphoretic.   Psychiatric: Mood and affect normal.     Sensation/strength in extremities normal.  Romberg, finger nose test, standing on one leg test, heel toe walk all normal.  Cranial nerves normal.  Provocative test for bppv neg here in clinic    See ACT    ASSESSMENT / PLAN:  (H81.12) BPPV (benign paroxysmal positional vertigo), left  (primary encounter diagnosis)  Comment: even though provocative test neg here, the history is classic for bppv.  Exam normal; reassuring.   Plan: monitor symptoms closely.  Follow up prn.  Stay well hydrated.      (J45.20) Intermittent asthma, uncomplicated  Comment: very well controlled as demonstrated by ACT score   Plan: Asthma Action Plan (AAP)        Continue current plan.  Hardly ever needs the albuterol.       I reviewed the patient's medications, allergies, medical history, family history, and social history.    Nicolás Morejon MD

## 2017-09-25 NOTE — TELEPHONE ENCOUNTER
My chart message sent requesting patient call nurse line for appointment     Adelaide Arnold RN  [facility]

## 2017-09-26 ASSESSMENT — ASTHMA QUESTIONNAIRES: ACT_TOTALSCORE: 24

## 2017-10-16 ENCOUNTER — OFFICE VISIT (OUTPATIENT)
Dept: FAMILY MEDICINE | Facility: CLINIC | Age: 57
End: 2017-10-16
Payer: COMMERCIAL

## 2017-10-16 VITALS
WEIGHT: 158.5 LBS | TEMPERATURE: 97.3 F | DIASTOLIC BLOOD PRESSURE: 77 MMHG | BODY MASS INDEX: 25.17 KG/M2 | SYSTOLIC BLOOD PRESSURE: 117 MMHG | HEART RATE: 57 BPM | OXYGEN SATURATION: 100 %

## 2017-10-16 DIAGNOSIS — Z11.9 SCREENING EXAMINATION FOR INFECTIOUS DISEASE: ICD-10-CM

## 2017-10-16 DIAGNOSIS — R73.01 IMPAIRED FASTING GLUCOSE: ICD-10-CM

## 2017-10-16 DIAGNOSIS — Z00.00 ROUTINE GENERAL MEDICAL EXAMINATION AT A HEALTH CARE FACILITY: Primary | ICD-10-CM

## 2017-10-16 DIAGNOSIS — L71.9 ROSACEA: ICD-10-CM

## 2017-10-16 DIAGNOSIS — K21.9 GASTROESOPHAGEAL REFLUX DISEASE, ESOPHAGITIS PRESENCE NOT SPECIFIED: ICD-10-CM

## 2017-10-16 DIAGNOSIS — Z12.5 SCREENING FOR PROSTATE CANCER: ICD-10-CM

## 2017-10-16 DIAGNOSIS — J45.20 INTERMITTENT ASTHMA, UNCOMPLICATED: ICD-10-CM

## 2017-10-16 DIAGNOSIS — Z23 NEED FOR PROPHYLACTIC VACCINATION AND INOCULATION AGAINST INFLUENZA: ICD-10-CM

## 2017-10-16 LAB
HBA1C MFR BLD: 5.4 % (ref 4.3–6)
PSA SERPL-ACNC: 0.55 UG/L (ref 0–4)

## 2017-10-16 PROCEDURE — 90471 IMMUNIZATION ADMIN: CPT | Performed by: FAMILY MEDICINE

## 2017-10-16 PROCEDURE — 83036 HEMOGLOBIN GLYCOSYLATED A1C: CPT | Performed by: FAMILY MEDICINE

## 2017-10-16 PROCEDURE — 36415 COLL VENOUS BLD VENIPUNCTURE: CPT | Performed by: FAMILY MEDICINE

## 2017-10-16 PROCEDURE — 90686 IIV4 VACC NO PRSV 0.5 ML IM: CPT | Performed by: FAMILY MEDICINE

## 2017-10-16 PROCEDURE — G0103 PSA SCREENING: HCPCS | Performed by: FAMILY MEDICINE

## 2017-10-16 PROCEDURE — 99396 PREV VISIT EST AGE 40-64: CPT | Mod: 25 | Performed by: FAMILY MEDICINE

## 2017-10-16 PROCEDURE — 86803 HEPATITIS C AB TEST: CPT | Performed by: FAMILY MEDICINE

## 2017-10-16 ASSESSMENT — PAIN SCALES - GENERAL: PAINLEVEL: NO PAIN (0)

## 2017-10-16 NOTE — MR AVS SNAPSHOT
After Visit Summary   10/16/2017    Jose Wise    MRN: [id number]           Patient Information     Date Of Birth          1960        Visit Information        Provider Department      10/16/2017 8:00 AM Nicolás Morejon MD [facility]        Today's Diagnoses     Need for prophylactic vaccination and inoculation against influenza    -  1    Screening examination for infectious disease        Screening for prostate cancer        Impaired fasting glucose        Gastroesophageal reflux disease without esophagitis        Intermittent asthma, uncomplicated        Rosacea          Care Instructions    We will send you lab results    Stay on same medcations for now    Call to schedule upper scope exam              Follow-ups after your visit        Additional Services     GASTROENTEROLOGY ADULT REF PROCEDURE ONLY       Last Lab Result: Creatinine (mg/dL)       Date                     Value                 05/16/2017               0.94             ----------  Body mass index is 25.17 kg/(m^2).     Needed:  No  Language:  English    Patient will be contacted to schedule procedure.     Please be aware that coverage of these services is subject to the terms and limitations of your health insurance plan.  Call member services at your health plan with any benefit or coverage questions.  Any procedures must be performed at a [facility] facility OR coordinated by your clinic's referral office.    Please bring the following with you to your appointment:    (1) Any X-Rays, CTs or MRIs which have been performed.  Contact the facility where they were done to arrange for  prior to your scheduled appointment.    (2) List of current medications   (3) This referral request   (4) Any documents/labs given to you for this referral                  Your next 10 appointments already scheduled     Nov 14, 2017  8:20 AM CST   (Arrive by 8:05 AM)   CT SOFT TISSUE NECK W CONTRAST  with UCCT1   [facility] ([facility])    [address] [id number] [phone]           Please bring any scans or X-rays taken at other hospitals, if similar tests were done. Also bring a list of your medicines, including vitamins, minerals and over-the-counter drugs. It is safest to leave personal items at home.  Be sure to tell your doctor:   If you have any allergies.   If there s any chance you are pregnant.   If you are breastfeeding.   If you have any special needs.  You will have contrast for this exam. To prepare:   Do not eat or drink for 2 hours before your exam. If you need to take medicine, you may take it with small sips of water. (We may ask you to take liquid medicine as well.)   The day before your exam, drink extra fluids at least six 8-ounce glasses (unless your doctor tells you to restrict your fluids).  Patients over 70 or patients with diabetes or kidney problems:   If you haven t had a blood test (creatinine test) within the last 30 days, go to your clinic or Diagnostic Imaging Department for this test.  If you have diabetes:   If your kidney function is normal, continue taking your metformin (Avandamet, Glucophage, Glucovance, Metaglip) on the day of your exam.   If your kidney function is abnormal, wait 48 hours before restarting this medicine.  Please wear loose clothing, such as a sweat suit or jogging clothes. Avoid snaps, zippers and other metal. We may ask you to undress and put on a hospital gown.  If you have any questions, please call the Imaging Department where you will have your exam.            Nov 14, 2017  8:40 AM CST   (Arrive by 8:25 AM)   CT CHEST W CONTRAST with UCCT1   [facility] CT ([facility])    [address] [id number] [phone]           Please bring any scans or X-rays taken at other hospitals, if similar tests were  done. Also bring a list of your medicines, including vitamins, minerals and over-the-counter drugs. It is safest to leave personal items at home.  Be sure to tell your doctor:   If you have any allergies.   If there s any chance you are pregnant.   If you are breastfeeding.   If you have any special needs.  You will have contrast for this exam. To prepare:   Do not eat or drink for 2 hours before your exam. If you need to take medicine, you may take it with small sips of water. (We may ask you to take liquid medicine as well.)   The day before your exam, drink extra fluids at least six 8-ounce glasses (unless your doctor tells you to restrict your fluids).  Patients over 70 or patients with diabetes or kidney problems:   If you haven t had a blood test (creatinine test) within the last 30 days, go to your clinic or Diagnostic Imaging Department for this test.  If you have diabetes:   If your kidney function is normal, continue taking your metformin (Avandamet, Glucophage, Glucovance, Metaglip) on the day of your exam.   If your kidney function is abnormal, wait 48 hours before restarting this medicine.  Please wear loose clothing, such as a sweat suit or jogging clothes. Avoid snaps, zippers and other metal. We may ask you to undress and put on a hospital gown.  If you have any questions, please call the Imaging Department where you will have your exam.            Nov 14, 2017  9:00 AM CST   Video Swallow with GRANT Sun   [facility] ([facility])    [address] [place] [address]  4th Floor  [place] [address] [phone]           Please check in for this visit in Imaging on the 1st floor.            Nov 14, 2017  9:00 AM CST   XR VIDEO SPEECH EVALUATION WITH ESOPHAGRAM with UCXR2, KEELEY GIGU RAD   [facility] ([facility])    [address] [place] [address]  1st Floor  [facility] [id number] [phone]           Please bring a list of your  current medicines to your exam. (Include vitamins, minerals and over-the-counter medicines.) Leave your valuables at home.  Tell the doctor if there is a chance you could be pregnant.  Do not eat for 4 hours before the exam. Keep drinking clear liquids until 2 hours before the exam.  You may take pain medicine (with a sip of water) up to 4 hours before the exam.  Do not swallow any other medicines unless your doctor tells you to. Talk to your doctor to be sure it s safe to stop your medicines.  Please call the Imaging Department at your exam site with any questions.            Nov 14, 2017  3:00 PM CST   (Arrive by 2:45 PM)   Return Visit with Janes Romo DO   [facility] ([facility])    [address] [place] [address]  2nd [facility] [address] [phone]            Mar 07, 2018  4:20 PM CST   (Arrive by 4:05 PM)   Return Visit with Yazmin Bundy MD   [facility] ([facility])    [address] [place] [address]  4th [facility] [address] [phone]              Who to contact     If you have questions or need follow up information about today's clinic visit or your schedule please contact [facility] directly at [phone].  Normal or non-critical lab and imaging results will be communicated to you by MyChart, letter or phone within 4 business days after the clinic has received the results. If you do not hear from us within 7 days, please contact the clinic through MyChart or phone. If you have a critical or abnormal lab result, we will notify you by phone as soon as possible.  Submit refill requests through [employer] or call your pharmacy and they will forward the refill request to us. Please allow 3 business days for your refill to be completed.          Additional Information About Your Visit        [employer] Information     [employer] gives you secure access to your electronic health  record. If you see a primary care provider, you can also send messages to your care team and make appointments. If you have questions, please call your primary care clinic.  If you do not have a primary care provider, please call [phone] and they will assist you.        Care EveryWhere ID     This is your Care EveryWhere ID. This could be used by other organizations to access your [facility] medical records  [id number]        Your Vitals Were     Pulse Temperature Pulse Oximetry BMI (Body Mass Index)          57 97.3  F (36.3  C) (Oral) 100% 25.17 kg/m2         Blood Pressure from Last 3 Encounters:   10/16/17 117/77   09/25/17 111/68   05/16/17 123/85    Weight from Last 3 Encounters:   10/16/17 158 lb 8 oz (71.9 kg)   09/25/17 158 lb (71.7 kg)   09/06/17 157 lb (71.2 kg)              We Performed the Following     FLU VAC, SPLIT VIRUS IM > 3 YO (QUADRIVALENT) [32516]     GASTROENTEROLOGY ADULT REF PROCEDURE ONLY     Hemoglobin A1c     Hepatitis C antibody     Prostate spec antigen screen     Vaccine Administration, Initial [00826]          Where to get your medicines      These medications were sent to [facility]/pharmacy #[id number] [address] [address]     Phone:  [phone]     metroNIDAZOLE 0.75 % cream         These medications were sent to [facility] - [address] [address]     Phone:  [phone]     mometasone 220 MCG/INH Inhaler    ranitidine 300 MG tablet          Primary Care Provider Office Phone # Fax #    Nicolás Morejon [phone]       [address] [facility] [address]        Equal Access to Services     ANUJA STEWART AH: Justin Bryan, wateshada luqadaha, qaybta kaalmada dorothyyada, opal duron. So [facility] [phone].    ATENCIÓN: Si habla español, tiene a william disposición servicios gratuitos de  asistencia lingüística. Magen al [phone].    We comply with applicable federal civil rights laws and [place] laws. We do not discriminate on the basis of race, color, national origin, age, disability, sex, sexual orientation, or gender identity.            Thank you!     Thank you for choosing [facility]  for your care. Our goal is always to provide you with excellent care. Hearing back from our patients is one way we can continue to improve our services. Please take a few minutes to complete the written survey that you may receive in the mail after your visit with us. Thank you!             Your Updated Medication List - Protect others around you: Learn how to safely use, store and throw away your medicines at www.disposemymeds.org.          This list is accurate as of: 10/16/17  8:36 AM.  Always use your most recent med list.                   Brand Name Dispense Instructions for use Diagnosis    albuterol 108 (90 BASE) MCG/ACT Inhaler    VENTOLIN HFA    1 Inhaler    Inhale 2 puffs into the lungs 4 times daily as needed    Intermittent asthma, uncomplicated       calcium carbonate 1250 MG tablet    OS-KAVYA 500 mg [place]. Ca     Take by mouth 2 times daily        fish Oil 1200 MG capsule           GLUCOSAMINE CHONDR COMPLEX PO      Take 1,000 mg by mouth        metroNIDAZOLE 0.75 % cream    METROCREAM    45 g    Apply topically 2 times daily as needed    Rosacea       mometasone 220 MCG/INH Inhaler    ASMANEX 30 METERED DOSES    3 Inhaler    Inhale 1 puff into the lungs daily    Intermittent asthma, uncomplicated       MULTI-VITAMIN PO      Take  by mouth. Occasionally        ranitidine 300 MG tablet    ZANTAC    90 tablet    Take 1 tablet (300 mg) by mouth At Bedtime    Gastroesophageal reflux disease without esophagitis

## 2017-10-16 NOTE — PATIENT INSTRUCTIONS
We will send you lab results    Stay on same medcations for now    Call to schedule upper scope exam

## 2017-10-16 NOTE — PROGRESS NOTES
SUBJECTIVE:   CC: Jose Wise is an 57 year old male who presents for preventative health visit.     Physical   Annual:     Getting at least 3 servings of Calcium per day::  Yes    Bi-annual eye exam::  Yes    Dental care twice a year::  Yes    Sleep apnea or symptoms of sleep apnea::  Daytime drowsiness    Diet::  Regular (no restrictions)    Frequency of exercise::  None    Taking medications regularly::  Yes    Medication side effects::  None    Additional concerns today::  YES            None    Feeling well    Not as dizzy as was, but notices that it is there      Today's PHQ-2 Score:   PHQ-2 ( 1999 Pfizer) 10/15/2017   Q1: Little interest or pleasure in doing things 0   Q2: Feeling down, depressed or hopeless 0   PHQ-2 Score 0   Q1: Little interest or pleasure in doing things Not at all   Q2: Feeling down, depressed or hopeless Not at all   PHQ-2 Score 0       Abuse: Current or Past(Physical, Sexual or Emotional)- No  Do you feel safe in your environment - Yes    Social History   Substance Use Topics     Smoking status: Former Smoker     Packs/day: 0.50     Years: 25.00     Types: Cigarettes     Start date: 6/6/1980     Quit date: 1/9/2006     Smokeless tobacco: Never Used      Comment: 1 pack a day     Alcohol use No      Comment: 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    Last PSA:   PSA   Date Value Ref Range Status   12/15/2014 0.53 0 - 4 ug/L Final       Reviewed orders with patient. Reviewed health maintenance and updated orders accordingly - Yes       Reviewed and updated as needed this visit by clinical staff  Tobacco  Allergies  Meds  Problems  Med Hx  Surg Hx  Fam Hx  Soc Hx          Reviewed and updated as needed this visit by Provider              ROS:  C: NEGATIVE for fever, chills, change in weight  I: NEGATIVE for worrisome rashes, moles or lesions  E: NEGATIVE for vision changes or irritation  ENT: NEGATIVE  for ear, mouth and throat problems  R: NEGATIVE for significant cough or SOB  CV: NEGATIVE for chest pain, palpitations or peripheral edema  GI: NEGATIVE for nausea, abdominal pain, heartburn, or change in bowel habits   male: negative for dysuria, hematuria, decreased urinary stream, erectile dysfunction, urethral discharge  M: NEGATIVE for significant arthralgias or myalgia  N: NEGATIVE for weakness, dizziness or paresthesias    Very little exercise    Walks a fair amount, active    Some nights has some problems sleeping, not taking anything for it    OBJECTIVE:   /77 (BP Location: Right arm, Patient Position: Chair, Cuff Size: Adult Regular)  Pulse 57  Temp 97.3  F (36.3  C) (Oral)  Wt 158 lb 8 oz (71.9 kg)  SpO2 100%  BMI 25.17 kg/m2    EXAM:  GENERAL: healthy, alert and no distress  HENT: ear canals and TM's normal, nose and mouth without ulcers or lesions  NECK: no adenopathy, no asymmetry, masses, or scars and thyroid normal to palpation  RESP: lungs clear to auscultation - no rales, rhonchi or wheezes  CV: regular rate and rhythm, normal S1 S2, no S3 or S4, no murmur, click or rub, no peripheral edema and peripheral pulses strong  ABDOMEN: soft, nontender, no hepatosplenomegaly, no masses and bowel sounds normal   (male): normal male genitalia without lesions or urethral discharge, no hernia  RECTAL: normal sphincter tone, no rectal masses, prostate normal size, smooth, nontender without nodules or masses  MS: no gross musculoskeletal defects noted, no edema  SKIN: no suspicious lesions or rashes  NEURO: Normal strength and tone, mentation intact and speech normal  PSYCH: mentation appears normal, affect normal/bright    ASSESSMENT/PLAN:   Jose was seen today for physical and health maintenance.    Diagnoses and all orders for this visit:    Routine general medical examination at a health care facility    Gastroesophageal reflux disease, esophagitis presence not specified  -     ranitidine  "(ZANTAC) 300 MG tablet; Take 1 tablet (300 mg) by mouth At Bedtime  -     GASTROENTEROLOGY ADULT REF PROCEDURE ONLY  -     GASTROENTEROLOGY ADULT REF PROCEDURE ONLY    Need for prophylactic vaccination and inoculation against influenza  -     FLU VAC, SPLIT VIRUS IM > 3 YO (QUADRIVALENT) [26647]  -     Vaccine Administration, Initial [28676]    Screening examination for infectious disease  -     Hepatitis C antibody    Screening for prostate cancer  -     Prostate spec antigen screen    Impaired fasting glucose  -     Hemoglobin A1c    Intermittent asthma, uncomplicated  -     mometasone (ASMANEX 30 METERED DOSES) 220 MCG/INH Inhaler; Inhale 1 puff into the lungs daily    Rosacea  -     metroNIDAZOLE (METROCREAM) 0.75 % cream; Apply topically 2 times daily as needed      Overall patient stable  With the gerd and family history of esophageal cancer, prudent to do upper scope exam  I put in order; patient will call and schedule  Up to date on colonoscopy  Keep working on healthy diet/exercise   Refill meds  Check a few labs ( he had several checked earlier this year )   Has the scheduled follow up with specialist regarding history of cancer   Flu shot given        COUNSELING:   Reviewed preventive health counseling, as reflected in patient instructions       Regular exercise       Healthy diet/nutrition       Vision screening       Safe sex practices/STD prevention       Colon cancer screening       Prostate cancer screening           reports that he quit smoking about 11 years ago. His smoking use included Cigarettes. He started smoking about 37 years ago. He has a 12.50 pack-year smoking history. He has never used smokeless tobacco.      Estimated body mass index is 25.17 kg/(m^2) as calculated from the following:    Height as of 9/6/17: 5' 6.53\" (1.69 m).    Weight as of this encounter: 158 lb 8 oz (71.9 kg).         Counseling Resources:  ATP IV Guidelines  Pooled Cohorts Equation Calculator  FRAX Risk " Assessment  ICSI Preventive Guidelines  Dietary Guidelines for Americans, 2010  USDA's MyPlate  ASA Prophylaxis  Lung CA Screening    Nicolás Morejon MD  [facility] for HPI/ROS submitted by the patient on 10/15/2017   PHQ-2 Score: 0    Injectable Influenza Immunization Documentation    1.  Is the person to be vaccinated sick today?   No    2. Does the person to be vaccinated have an allergy to a component   of the vaccine?   No    3. Has the person to be vaccinated ever had a serious reaction   to influenza vaccine in the past?   No    4. Has the person to be vaccinated ever had Guillain-Barré syndrome?   No    Form completed by Hemalatha Parikh CMA

## 2017-10-16 NOTE — NURSING NOTE
"Chief Complaint   Patient presents with     Physical     Health Maintenance       Initial /77 (BP Location: Right arm, Patient Position: Chair, Cuff Size: Adult Regular)  Pulse 57  Temp 97.3  F (36.3  C) (Oral)  Wt 158 lb 8 oz (71.9 kg)  SpO2 100%  BMI 25.17 kg/m2 Estimated body mass index is 25.17 kg/(m^2) as calculated from the following:    Height as of 9/6/17: 5' 6.53\" (1.69 m).    Weight as of this encounter: 158 lb 8 oz (71.9 kg).  Medication Reconciliation: complete   Hemalatha Parikh CMA      "

## 2017-10-17 LAB — HCV AB SERPL QL IA: NONREACTIVE

## 2017-10-18 NOTE — PROGRESS NOTES
Here are these results, normal.    You do not have hepatitis C or diabetes.  Prostate test is fine.    Nicolás Morejon MD

## 2017-11-09 ASSESSMENT — ENCOUNTER SYMPTOMS
EYE PAIN: 0
RECTAL PAIN: 0
TROUBLE SWALLOWING: 1
DOUBLE VISION: 0
STIFFNESS: 0
FATIGUE: 0
SEIZURES: 0
EYE REDNESS: 0
ARTHRALGIAS: 1
DIARRHEA: 0
ALTERED TEMPERATURE REGULATION: 1
FEVER: 0
CONSTIPATION: 0
NECK PAIN: 1
BACK PAIN: 1
BLOOD IN STOOL: 0
HOARSE VOICE: 1
SPEECH CHANGE: 0
MUSCLE CRAMPS: 0
ORTHOPNEA: 0
EYE WATERING: 0
TREMORS: 0
PALPITATIONS: 0
MUSCLE WEAKNESS: 0
DISTURBANCES IN COORDINATION: 0
POLYPHAGIA: 0
WEIGHT GAIN: 0
JOINT SWELLING: 0
NUMBNESS: 0
HEADACHES: 1
TASTE DISTURBANCE: 0
EXERCISE INTOLERANCE: 0
SINUS PAIN: 0
TINGLING: 0
DECREASED APPETITE: 0
INCREASED ENERGY: 0
SINUS CONGESTION: 0
SYNCOPE: 0
VOMITING: 0
SMELL DISTURBANCE: 0
ABDOMINAL PAIN: 0
DIZZINESS: 1
POLYDIPSIA: 0
PARALYSIS: 0
LOSS OF CONSCIOUSNESS: 1
CHILLS: 1
NIGHT SWEATS: 0
BOWEL INCONTINENCE: 0
NAUSEA: 0
SLEEP DISTURBANCES DUE TO BREATHING: 0
WEIGHT LOSS: 0
HALLUCINATIONS: 0
HYPOTENSION: 0
SORE THROAT: 0
HYPERTENSION: 0
NECK MASS: 0
MYALGIAS: 1
HEARTBURN: 1
LIGHT-HEADEDNESS: 1
BLOATING: 0
EYE IRRITATION: 1
LEG PAIN: 0
WEAKNESS: 0
JAUNDICE: 0

## 2017-11-14 ENCOUNTER — THERAPY VISIT (OUTPATIENT)
Dept: SPEECH THERAPY | Facility: CLINIC | Age: 57
End: 2017-11-14
Attending: OTOLARYNGOLOGY

## 2017-11-14 ENCOUNTER — ONCOLOGY VISIT (OUTPATIENT)
Dept: ONCOLOGY | Facility: CLINIC | Age: 57
End: 2017-11-14
Attending: INTERNAL MEDICINE
Payer: COMMERCIAL

## 2017-11-14 VITALS
SYSTOLIC BLOOD PRESSURE: 127 MMHG | HEART RATE: 80 BPM | WEIGHT: 158 LBS | RESPIRATION RATE: 18 BRPM | DIASTOLIC BLOOD PRESSURE: 83 MMHG | OXYGEN SATURATION: 96 % | BODY MASS INDEX: 24.8 KG/M2 | TEMPERATURE: 98.2 F | HEIGHT: 67 IN

## 2017-11-14 DIAGNOSIS — R13.13 PHARYNGEAL DYSPHAGIA: ICD-10-CM

## 2017-11-14 DIAGNOSIS — C09.0 MALIGNANT NEOPLASM OF TONSILLAR FOSSA (H): Primary | ICD-10-CM

## 2017-11-14 PROCEDURE — 99213 OFFICE O/P EST LOW 20 MIN: CPT | Mod: GC | Performed by: INTERNAL MEDICINE

## 2017-11-14 PROCEDURE — 99213 OFFICE O/P EST LOW 20 MIN: CPT | Mod: ZF

## 2017-11-14 ASSESSMENT — PAIN SCALES - GENERAL: PAINLEVEL: NO PAIN (0)

## 2017-11-14 NOTE — MR AVS SNAPSHOT
After Visit Summary   11/14/2017    Jose Wise    MRN: [id number]           Patient Information     Date Of Birth          1960        Visit Information        Provider Department      11/14/2017 3:00 PM Janes Romo DO [facility]        Today's Diagnoses     MALIG NEOPLASM TONSILLAR FOSSA    -  1       Follow-ups after your visit        Follow-up notes from your care team     Return for Physical Exam, Lab Work.      Your next 10 appointments already scheduled     Mar 07, 2018  4:20 PM CST   (Arrive by 4:05 PM)   Return Visit with Yazmin Bundy MD   [facility] ([facility])    [address] [place] [address]  [address]th [facility] [id number] [phone]            May 14, 2018  4:30 PM CDT   (Arrive by 4:15 PM)   Return Visit with KENTON Mak   [facility] ([facility])    [address] [place] [address]  [address] [facility] [id number] [phone]            Nov 13, 2018  3:30 PM CST   [facility] Lab Draw with  [facility] DRAW   [facility] Lab Draw ([facility])    [address] [place] [address]  2[address] [facility] [id number] [phone]            Nov 13, 2018  4:00 PM CST   (Arrive by 3:45 PM)   Return Visit with Janes Romo DO   [facility] ([facility])    [address] [place] [address]  [address] [facility] [id number] [phone]              Who to contact     If you have questions or need follow up information about today's clinic visit or your schedule please contact [facility] directly at [phone].  Normal or non-critical lab and imaging results will be communicated to you by MyChart, letter or phone within 4 business days after the clinic has received the results. If you do not hear from us within 7 days, please contact the clinic through MyChart or phone.  "If you have a critical or abnormal lab result, we will notify you by phone as soon as possible.  Submit refill requests through HotPads or call your pharmacy and they will forward the refill request to us. Please allow 3 business days for your refill to be completed.          Additional Information About Your Visit        Sodbusterhart Information     HotPads gives you secure access to your electronic health record. If you see a primary care provider, you can also send messages to your care team and make appointments. If you have questions, please call your primary care clinic.  If you do not have a primary care provider, please call [phone] and they will assist you.        Care EveryWhere ID     This is your Care EveryWhere ID. This could be used by other organizations to access your [facility] medical records  [id number]        Your Vitals Were     Pulse Temperature Respirations Height Pulse Oximetry BMI (Body Mass Index)    80 98.2  F (36.8  C) (Tympanic) 18 1.69 m (5' 6.53\") 96% 25.09 kg/m2       Blood Pressure from Last 3 Encounters:   11/14/17 127/83   10/16/17 117/77   09/25/17 111/68    Weight from Last 3 Encounters:   11/14/17 71.7 kg (158 lb)   10/16/17 71.9 kg (158 lb 8 oz)   09/25/17 71.7 kg (158 lb)               Primary Care Provider Office Phone # Fax #    Nicolás Morejon [phone]       [address] [facility] [address]        Equal Access to Services     [facility] AH: Hadii aad ku hadasho Soomaali, waaxda luqadaha, qaybta kaalmada adeegyada, opal snell'get . So [facility] [phone].    ATENCIÓN: Si habla español, tiene a william disposición servicios gratuitos de asistencia lingüística. Llame al [phone].    We comply with applicable federal civil rights laws and [place] laws. We do not discriminate on the basis of race, color, national origin, age, disability, sex, sexual orientation, or gender identity.            Thank you!     Thank you " for choosing [facility]  for your care. Our goal is always to provide you with excellent care. Hearing back from our patients is one way we can continue to improve our services. Please take a few minutes to complete the written survey that you may receive in the mail after your visit with us. Thank you!             Your Updated Medication List - Protect others around you: Learn how to safely use, store and throw away your medicines at www.disposemymeds.org.          This list is accurate as of: 11/14/17 11:59 PM.  Always use your most recent med list.                   Brand Name Dispense Instructions for use Diagnosis    albuterol 108 (90 BASE) MCG/ACT Inhaler    VENTOLIN HFA    1 Inhaler    Inhale 2 puffs into the lungs 4 times daily as needed    Intermittent asthma, uncomplicated       calcium carbonate 1250 MG tablet    OS-KAVYA 500 mg [place]. Ca     Take by mouth 2 times daily        fish Oil 1200 MG capsule           GLUCOSAMINE CHONDR COMPLEX PO      Take 1,000 mg by mouth        metroNIDAZOLE 0.75 % cream    METROCREAM    45 g    Apply topically 2 times daily as needed    Rosacea       mometasone 220 MCG/INH Inhaler    ASMANEX 30 METERED DOSES    3 Inhaler    Inhale 1 puff into the lungs daily    Intermittent asthma, uncomplicated       MULTI-VITAMIN PO      Take  by mouth. Occasionally        ranitidine 300 MG tablet    ZANTAC    90 tablet    Take 1 tablet (300 mg) by mouth At Bedtime    Gastroesophageal reflux disease, esophagitis presence not specified

## 2017-11-14 NOTE — LETTER
11/14/2017       RE: Jose Wise  [address]  [place] [address]     Dear Colleague,    Thank you for referring your patient, Jose Wise, to the [facility]. Please see a copy of my visit note below.    REASON FOR VISIT:    CANCER STAGE: MALIG NEOPLASM TONSILLAR FOSSA    Staging form: Pharynx - Oropharynx, AJCC 7th Edition      Clinical: Stage KWADWO (T2, N2b, M0) - Signed by Janes Romo DO on 11/8/2016     HISTORY OF PRESENT ILLNESS:  - 10/2013 lump in his R neck.  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 There was no LN element so it could be a soft tissue mass.  - 1/9/14 He was referred to Dr. Jayashree Alvarez at [facility] who perfromed a laryngoscopy on  revealed enlarged R tonsil.    - 1/15/14 Direct laryngoscopy and a biopsy of the R tonsil was identified squamous cell carcinoma.  P16+.     - 1/9/14 PET/CT showed a hypermetabolic tonsillar mass and 2 hypermetabolic LNs on the R side in level 2, measured 2.1cm.  No evidence of distant mets, but multiple tiny pulmonary nodules were seen, felt unlikely to be mets.     -1/17/14 he consulted radiation oncology and Dr. Romo in medical oncology.     -1/27/14 started on chemorads with HD cisplatin. He developed ototoxicity and was switched to carboplatin (AUC 6) for day 22.  He did not receive carboplatin day 43 due to thrombocytopenia.    Radiation therapy completed on 3/14/14 (7000cGy).    - His PET/CT after treatment showed a complete response.       INTERVAL HISTORY:  Jose returns today for followup.  He reports overall doing well.  He does not feel any masses in his neck or any lymph nodes.  He still continues to have dry mouth; however, this is not bothersome to him.  He is having some difficulty with swallowing food, especially when it is dry food, but overall he really does not have any new symptoms or concerns.  He stays very active.  He has no limitations in his activities.  He does have some symptoms of  "heartburn and is having an upper endoscopy scheduled.  He also has chronic lower back pain that has been controlled with current medications.  He has no other new symptoms.  He has no bone pain, no fevers, no chills, no headaches, no dyspnea or other respiratory complaints, no abdominal pain.  He does have chronic ringing in his ears after the radiation and chemotherapy.                 Review Of Systems  10-point review of systems were negative except as noted in HPI.        EXAM:  Blood pressure 127/83, pulse 80, temperature 98.2  F (36.8  C), temperature source Tympanic, resp. rate 18, height 1.69 m (5' 6.53\"), weight 71.7 kg (158 lb), SpO2 96 %.   Wt Readings from Last 10 Encounters:   11/14/17 71.7 kg (158 lb)   10/16/17 71.9 kg (158 lb 8 oz)   09/25/17 71.7 kg (158 lb)   09/06/17 71.2 kg (157 lb)   05/16/17 71.9 kg (158 lb 8 oz)   05/03/17 72.1 kg (159 lb)   02/01/17 71.2 kg (157 lb)   11/09/16 70.8 kg (156 lb)   11/08/16 71.1 kg (156 lb 12 oz)   08/26/16 70.8 kg (156 lb)       GEN: alert and oriented x 3, nad  HEENT: perrla, eomi, sclera anicteric, oral mucosa moist without thrush or lesions  NECK: supple, no palpable LAD or masses  HT: reg rate and rhythm, no murmurs, or gallops  LUNGS: clear to auscultation bilaterally  ABD: soft, nt, nd, +bs x 4, no HSM appreciated  EXT: no clubbing, cyanosis, or edema  NEURO: CN 2-12 grossly intact    Current Outpatient Prescriptions   Medication Sig Dispense Refill     ranitidine (ZANTAC) 300 MG tablet Take 1 tablet (300 mg) by mouth At Bedtime 90 tablet 3     mometasone (ASMANEX 30 METERED DOSES) 220 MCG/INH Inhaler Inhale 1 puff into the lungs daily 3 Inhaler 3     metroNIDAZOLE (METROCREAM) 0.75 % cream Apply topically 2 times daily as needed 45 g 11     calcium carbonate (OS-KAVYA 500 MG [place]. CA) 500 MG tablet Take by mouth 2 times daily       Glucosamine-Chondroitin (GLUCOSAMINE CHONDR COMPLEX PO) Take 1,000 mg by mouth       Omega-3 Fatty Acids (FISH OIL) 1200 MG " CAPS        Multiple Vitamin (MULTI-VITAMIN PO) Take  by mouth. Occasionally         albuterol (VENTOLIN HFA) 108 (90 BASE) MCG/ACT inhaler Inhale 2 puffs into the lungs 4 times daily as needed (Patient not taking: Reported on 11/14/2017) 1 Inhaler 11     LABs:  No labs done today    Imaging: reviewed    CT chest 11/14/17:      IMPRESSION: Slight interval increase in size of peripheral left lower  lobe nodule, remainder of the sub-4 mm pulmonary nodules are  unchanged. No new pulmonary nodule. Recommend close attention on  follow-up scans.      CT neck 11/14/17:        Impression: No evidence of tumor recurrence. Stable postradiation  changes throughout the neck and degenerative spine disease at C5-C6.    Assessment/Plan  R tonsil cancer [id number] - HPV+ - Jose is doing great. He has not had any evidence of disease on his recent scans. He is ~3.5 years out from treatment.   Had video swallow studies done and is following with speech therapy for this.  Jose will be working on swallowing exercises he was taught earlier today. Otherwise, he has minimal late toxicity from treatment.    Pulmonary nodules - These are small and stable, there is may be some enlargement of one of the nodule in LLL, however it is unlikely significant.  We will continue to follow with yearly surveillance.     TSH - we will repeat it next visit.   He will return in 6 mo with labs to see AP/NP and in 1 year to see Dr. Romo with CT scans prior.    Patient seen and discussed with Dr. Romo.    Edilia Dominguez MD  Hem-Onc Fellow      I saw and examined th patient with Dr. dominguez and agree with her note.  Jose is doing well.  We will monitor the pulmonary nodules, but I am not concerned about these at this time.  We discussed that 1mm change is within the margin of error, but it bears watching and we will.  RTC in 6 months.     Again, thank you for allowing me to participate in the care of your patient.      Sincerely,    Janes Cruz  Demetrius, DO

## 2017-11-14 NOTE — PROGRESS NOTES
" 11/14/17 0900   General Information   Type Of Visit Initial   Start Of Care Date 11/14/17   Referring Physician Dr. Yazmin Bundy   Orders Evaluate And Treat   Orders Comment video swallow study   Medical Diagnosis Dysphagia   Onset Of Illness/injury Or Date Of Surgery 01/09/14   Precautions/limitations No Known Precautions/limitations   Hearing WFL   Pertinent History of Current Problem/OT: Additional Occupational Profile Info Jose Wise is a 57-year-old man with a history of a [id number] SCC of the right tonsil. He was treated with concurrent chemoradiation, completed 3/14/2014. Pt was last seen in ENT clinic in Sept 2016 where he reported ongoing intermittent issues with swallowing marked by feeling as if things get \"hung up\" near the back of his throat when he tries to swallow. He was referred for a VFSS to further evaluate his oropharyngeal swallowing function. Upon clinical interview, pt reported difficulties swallowing dry foods including chicken, pork chops, licorice, candy corn, and raw apples. He endorsed feeling of pharyngeal stasis when swallowing these foods. He stated he coughs them up at times but using a liquid wash often helps to relieve this felling. He reported taking small bites, adding sauces to foods, and chewing foods well also helps. Pt reported a history of reflux for which he takes daily medication; although, he stated he frequently has a \"burning\" sensation. Pt endorsed often having mucous in his throat. He denied pain while swallowing, unintentional weight loss, recent pneumonias, or difficulties swallowing pills.    Respiratory Status Room air   Prior Level Of Function Swallowing   Prior Level Of Function Comment Regular textures, thin liquids   General Observations Pt pleasant and appropriate   Patient/family Goals To assess swallowing function   Pain Assessment   Pain Reported No   FALL RISK SCREEN   Have you fallen 2 or more times in the last year? No   Have you fallen and had an " injury in the past year? No   Is the patient a fall risk? No   Clinical Swallow Evaluation   Oral Musculature generally intact   Structural Abnormalities none present   Dentition present and adequate   Mucosal Quality good   Mandibular Strength and Mobility intact   Oral Labial Strength and Mobility WFL   Lingual Strength and Mobility WFL   Velar Elevation intact   Oral Musculature Comments WFL   VFSS Evaluation   VFSS Additional Documentation Yes   VFSS Eval: Radiology   Radiologist Resident   Views Taken left lateral;A/P   Physical Location of Procedure [facility]   VFSS Eval: Thin Liquid Texture Trial   Mode of Presentation, Thin Liquid cup   Order of Presentation 1, 2, 3, 6, 9, 10, 11  (additional images w/ pill not saved)   Preparatory Phase WFL   Oral Phase, Thin Liquid WFL   Pharyngeal Phase, Thin Liquid Delayed swallow reflex   Rosenbek's Penetration Aspiration Scale: Thin Liquid Trial Results 6 - contrast passes glottis, no subglottic residue remains (aspiration)   Response to Aspiration productive volitional cough following clinician cue   Successful Strategies Trialed During Procedure, Thin Liquid other (see comments)  (small single sips)   Diagnostic Statement Pt exhibits consistent delayed swallow response, frequently to level of pyriform sinuses. Large sips of thin liquids result in penetration with 1x penetration to level of vocal folds. Penetration is eliminated with use of small single sips. Pt demonstrates 1x flash aspiration to below level of vocal folds when swallowing a barium tablet with thin liquids when his head was tilted back; aspirated material is cleared into laryngeal vestibule with force of swallow. No penetration or aspiration is noted when pt swallows pill with small sip of thin liquids and keeps head in a neutral position.    VFSS Eval: Nectar Thick Liquid Texture Trial   Mode of Presentation, Nectar cup   Order of Presentation 4   Preparatory Phase WFL   Oral Phase, [place] WFL    Pharyngeal Phase, Nectar Delayed swallow reflex   Rosenbek's Penetration Aspiration Scale: Nectar-Thick Liquid Trial Results 1 - no aspiration, contrast does not enter airway   Diagnostic Statement Delayed swallow response again to level of pyriform sinuses. No penetration or aspiration.    VFSS Eval: Puree Solid Texture Trial   Mode of Presentation, Puree spoon   Order of Presentation 5   Preparatory Phase WFL   Oral Phase, Puree WFL   Pharyngeal Phase, Puree WFL   Rosenbek's Penetration Aspiration Scale: Puree Food Trial Results 1 - no aspiration, contrast does not enter airway   Diagnostic Statement Prompt swallow response with effective airway protection.    VFSS Eval: Solid Food Texture Trial   Mode of Presentation, Solid self-fed   Order of Presentation 7, 8   Preparatory Phase WFL   Oral Phase, Solid WFL   Pharyngeal Phase, Solid WFL   Rosenbek's Penetration Aspiration Scale: Solid Food Trial Results 1 - no aspiration, contrast does not enter airway   Diagnostic Statement Adequate mastication. Prompt swallow response with effective airway protection. No pharyngeal residue.    Swallow Compensations   Swallow Compensations Reduce amounts   Results No difficulties noted   Educational Assessment   Barriers to Learning No barriers   Esophageal Phase of Swallow   Patient reports or presents with symptoms of esophageal dysphagia Yes   Esophageal sweep performed during today s vidofluoroscopic exam  Yes;Please refer to radiologist's report for details   Esophageal comments Esophagram completed after VFSS. Per radiologist note, a small hiatal hernia is seen with small amount of gastroesophageal reflux.   General Therapy Interventions   Planned Therapy Interventions Dysphagia Treatment   Dysphagia treatment Oropharyngeal exercise training;Instruction of safe swallow strategies;Compensatory strategies for swallowing   Swallow Eval: Clinical Impressions   Skilled Criteria for Therapy Intervention Skilled criteria met.   Treatment indicated.   Dysphagia Outcome Severity Scale (JUAN) Level 5 - JUAN   Treatment Diagnosis Mild pharyngeal dysphagia   Diet texture recommendations Regular diet;Thin liquids  (small single sips)   Recommended Feeding/Eating Techniques alternate between small bites and sips of food/liquid;small sips/bites;other (see comments)  (head in neutral position when swallowing, add sauces, chew foods well, general reflux precautions)   Rehab Potential good, to achieve stated therapy goals   Predicted Duration of Therapy Intervention (days/wks) 1x/month x 6 months   Anticipated Discharge Disposition home w/ outpatient services   Risks and Benefits of Treatment have been explained. Yes   Patient, family and/or staff in agreement with Plan of Care Yes   Clinical Impression Comments Pt presents with mild pharyngeal dysphagia s/p concurrent chemoradiation in 2014 for R tonsilar CA. Oral phase is WFL and marked by adequate mastication, bolus control, and oral clearance. Pharyngeal phase is characterized by consistent delayed swallow response frequently to level of pyriform sinuses with thin liquids. As a result of delay, pt demonstrates penetration with residuals when swallowing large sips of thin liquids; 1x deep penetration to level of vocal folds is seen. Penetration is eliminated with use of small single sips. Improved timing of swallowing is noted with solid textures. No pharyngeal residuals are seen during today's swallow study; however, textures trialed may not represent pt's reported difficulties as difficulties most frequently occur with dry textures such as meats. Barium tablet easily passed through pharynx with thin liquids; however, pt exhibits flash aspiration of thin liquids when swallowing barium tablet. Pt had his head tilted backwards when this occurred. No penetration or aspiration of thin liquids is seen when pt takes a small sip and swallows barium tablet with his head in neutral position.     Given  results of today's VFSS, pt is at increased risk of aspiration of thin liquids. Risk can be reduced with use of safe swallowing strategies such as small single sips and maintaining head in neutral position while swallowing. Recommend continue small bites of food, add extra sauces, cut food into bite-sized pieces, and chew food well with dry solid textures to prevent feeling of pharyngeal stasis. Also recommend general reflux precautions. Recommend continue regular textures and thin liquids at this time with above precautions.    Swallow Goals   SLP Swallow Goals 1;2   Swallow Goal 1   Goal Identifier Diet   Goal Description 1. Pt will tolerate regular textured foods and thin liquids with use of compensatory swallowing strategies, independently, in 95% of PO trials.    Target Date 02/12/18   Swallow Goal 2   Goal Identifier Exercises   Goal Description 2. Pt will improve and maintain pharyngeal strength and jaw ROM by completing 10 reps of 6/6 exercises with minimal written cueing 3x/daily.   Target Date 02/12/18   Total Session Time   Total Session Time 45   Total Evaluation Time 30     Thank you for the referral of Jose Wise. If you have any questions about this report, please contact me using the information below.     Dana Marcus MA, CCC-SLP  Speech-Language Pathologist   [facility]  Department of Otolaryngology/D&T - 4th floor  Pager: [phone]  Phone: [phone]  Email: [email or web address]

## 2017-11-14 NOTE — PROGRESS NOTES
REASON FOR VISIT:    CANCER STAGE: MALIG NEOPLASM TONSILLAR FOSSA    Staging form: Pharynx - Oropharynx, AJCC 7th Edition      Clinical: Stage KWADWO (T2, N2b, M0) - Signed by Janes Romo DO on 11/8/2016     HISTORY OF PRESENT ILLNESS:  - 10/2013 lump in his R neck.  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 There was no LN element so it could be a soft tissue mass.  - 1/9/14 He was referred to Dr. Jayashree Alvarez at [facility] who perfromed a laryngoscopy on  revealed enlarged R tonsil.    - 1/15/14 Direct laryngoscopy and a biopsy of the R tonsil was identified squamous cell carcinoma.  P16+.     - 1/9/14 PET/CT showed a hypermetabolic tonsillar mass and 2 hypermetabolic LNs on the R side in level 2, measured 2.1cm.  No evidence of distant mets, but multiple tiny pulmonary nodules were seen, felt unlikely to be mets.     -1/17/14 he consulted radiation oncology and Dr. Romo in medical oncology.     -1/27/14 started on chemorads with HD cisplatin. He developed ototoxicity and was switched to carboplatin (AUC 6) for day 22.  He did not receive carboplatin day 43 due to thrombocytopenia.    Radiation therapy completed on 3/14/14 (7000cGy).    - His PET/CT after treatment showed a complete response.       INTERVAL HISTORY:  Jose returns today for followup.  He reports overall doing well.  He does not feel any masses in his neck or any lymph nodes.  He still continues to have dry mouth; however, this is not bothersome to him.  He is having some difficulty with swallowing food, especially when it is dry food, but overall he really does not have any new symptoms or concerns.  He stays very active.  He has no limitations in his activities.  He does have some symptoms of heartburn and is having an upper endoscopy scheduled.  He also has chronic lower back pain that has been controlled with current medications.  He has no other new symptoms.  He has no bone pain, no fevers, no chills, no headaches, no  "dyspnea or other respiratory complaints, no abdominal pain.  He does have chronic ringing in his ears after the radiation and chemotherapy.                 Review Of Systems  10-point review of systems were negative except as noted in HPI.        EXAM:  Blood pressure 127/83, pulse 80, temperature 98.2  F (36.8  C), temperature source Tympanic, resp. rate 18, height 1.69 m (5' 6.53\"), weight 71.7 kg (158 lb), SpO2 96 %.   Wt Readings from Last 10 Encounters:   11/14/17 71.7 kg (158 lb)   10/16/17 71.9 kg (158 lb 8 oz)   09/25/17 71.7 kg (158 lb)   09/06/17 71.2 kg (157 lb)   05/16/17 71.9 kg (158 lb 8 oz)   05/03/17 72.1 kg (159 lb)   02/01/17 71.2 kg (157 lb)   11/09/16 70.8 kg (156 lb)   11/08/16 71.1 kg (156 lb 12 oz)   08/26/16 70.8 kg (156 lb)       GEN: alert and oriented x 3, nad  HEENT: perrla, eomi, sclera anicteric, oral mucosa moist without thrush or lesions  NECK: supple, no palpable LAD or masses  HT: reg rate and rhythm, no murmurs, or gallops  LUNGS: clear to auscultation bilaterally  ABD: soft, nt, nd, +bs x 4, no HSM appreciated  EXT: no clubbing, cyanosis, or edema  NEURO: CN 2-12 grossly intact    Current Outpatient Prescriptions   Medication Sig Dispense Refill     ranitidine (ZANTAC) 300 MG tablet Take 1 tablet (300 mg) by mouth At Bedtime 90 tablet 3     mometasone (ASMANEX 30 METERED DOSES) 220 MCG/INH Inhaler Inhale 1 puff into the lungs daily 3 Inhaler 3     metroNIDAZOLE (METROCREAM) 0.75 % cream Apply topically 2 times daily as needed 45 g 11     calcium carbonate (OS-KAVYA 500 MG [place]. [place]) 500 MG tablet Take by mouth 2 times daily       Glucosamine-Chondroitin (GLUCOSAMINE CHONDR COMPLEX PO) Take 1,000 mg by mouth       Omega-3 Fatty Acids (FISH OIL) 1200 MG CAPS        Multiple Vitamin (MULTI-VITAMIN PO) Take  by mouth. Occasionally         albuterol (VENTOLIN HFA) 108 (90 BASE) MCG/ACT inhaler Inhale 2 puffs into the lungs 4 times daily as needed (Patient not taking: Reported on " 11/14/2017) 1 Inhaler 11     LABs:  No labs done today    Imaging: reviewed    CT chest 11/14/17:      IMPRESSION: Slight interval increase in size of peripheral left lower  lobe nodule, remainder of the sub-4 mm pulmonary nodules are  unchanged. No new pulmonary nodule. Recommend close attention on  follow-up scans.      CT neck 11/14/17:        Impression: No evidence of tumor recurrence. Stable postradiation  changes throughout the neck and degenerative spine disease at C5-C6.    Assessment/Plan  R tonsil cancer [id number] - HPV+ - Jose is doing great. He has not had any evidence of disease on his recent scans. He is ~3.5 years out from treatment.   Had video swallow studies done and is following with speech therapy for this.  Jose will be working on swallowing exercises he was taught earlier today. Otherwise, he has minimal late toxicity from treatment.    Pulmonary nodules - These are small and stable, there is may be some enlargement of one of the nodule in LLL, however it is unlikely significant.  We will continue to follow with yearly surveillance.     TSH - we will repeat it next visit.   He will return in 6 mo with labs to see AP/NP and in 1 year to see Dr. Romo with CT scans prior.    Patient seen and discussed with Dr. Romo.    Edilia Dominguez MD  Hem-Onc Fellow      I saw and examined th patient with Dr. dominguez and agree with her note.  Jose is doing well.  We will monitor the pulmonary nodules, but I am not concerned about these at this time.  We discussed that 1mm change is within the margin of error, but it bears watching and we will.  RTC in 6 months.

## 2017-11-14 NOTE — MR AVS SNAPSHOT
After Visit Summary   11/14/2017    Jose Wise    MRN: [id number]           Patient Information     Date Of Birth          1960        Visit Information        Provider Department      11/14/2017 9:00 AM Dana Marcus SLP  Health Rehab        Today's Diagnoses     Malignant neoplasm of tonsillar fossa (H)    -  1    Pharyngeal dysphagia           Follow-ups after your visit        Your next 10 appointments already scheduled     Mar 07, 2018  4:20 PM CST   (Arrive by 4:05 PM)   Return Visit with Yazmin Bundy MD   [facility] ([facility] [place])    [address] [place] [address] [phone]              Future tests that were ordered for you today     Open Future Orders        Priority Expected Expires Ordered    Comprehensive metabolic panel Routine 5/14/2018 11/14/2018 11/14/2017    TSH Routine 5/14/2018 11/14/2018 11/14/2017    *CBC with platelets differential Routine 5/14/2018 11/14/2018 11/14/2017            Who to contact     Please call your clinic at [phone] to:    Ask questions about your health    Make or cancel appointments    Discuss your medicines    Learn about your test results    Speak to your doctor   If you have compliments or concerns about an experience at your clinic, or if you wish to file a complaint, please contact [facility] Patient Relations at [phone] or email us at [email or web address]         Additional Information About Your Visit        MyChart Information     [email or web address] gives you secure access to your electronic health record. If you see a primary care provider, you can also send messages to your care team and make appointments. If you have questions, please call your primary care clinic.  If you do not have a primary care provider, please call [phone] and they will assist you.      [employer] is an electronic gateway that provides easy,  online access to your medical records. With [employer], you can request a clinic appointment, read your test results, renew a prescription or communicate with your care team.     To access your existing account, please contact your [facility] or call [phone] for assistance.        Care EveryWhere ID     This is your Care EveryWhere ID. This could be used by other organizations to access your [place] medical records  [id number]         Blood Pressure from Last 3 Encounters:   11/14/17 127/83   10/16/17 117/77   09/25/17 111/68    Weight from Last 3 Encounters:   11/14/17 71.7 kg (158 lb)   10/16/17 71.9 kg (158 lb 8 oz)   09/25/17 71.7 kg (158 lb)              Today, you had the following     No orders found for display       Primary Care Provider Office Phone # Fax #    Nicolás Morejon [phone]       [address] [facility] [address]        Equal Access to Services     MONIQUE [facility] CenterLINDSEY : Hadii aad ku hadasho Soomaali, waaxda luqadaha, qaybta kaalmada adeegyada, waxay idiin hayaan adeeg kharanasir la'mukeshn . So [facility] [phone].    ATENCIÓN: Si habla español, tiene a william disposición servicios gratuitos de asistencia lingüística. Llame al [phone].    We comply with applicable federal civil rights laws and [place] laws. We do not discriminate on the basis of race, color, national origin, age, disability, sex, sexual orientation, or gender identity.            Thank you!     Thank you for choosing [facility]  for your care. Our goal is always to provide you with excellent care. Hearing back from our patients is one way we can continue to improve our services. Please take a few minutes to complete the written survey that you may receive in the mail after your visit with us. Thank you!             Your Updated Medication List - Protect others around you: Learn how to safely use, store and throw away your medicines at www.disposemymeds.org.           This list is accurate as of: 11/14/17  4:04 PM.  Always use your most recent med list.                   Brand Name Dispense Instructions for use Diagnosis    albuterol 108 (90 BASE) MCG/ACT Inhaler    VENTOLIN HFA    1 Inhaler    Inhale 2 puffs into the lungs 4 times daily as needed    Intermittent asthma, uncomplicated       calcium carbonate 1250 MG tablet    OS-KAVYA 500 mg [place]. [place]     Take by mouth 2 times daily        fish Oil 1200 MG capsule           GLUCOSAMINE CHONDR COMPLEX PO      Take 1,000 mg by mouth        metroNIDAZOLE 0.75 % cream    METROCREAM    45 g    Apply topically 2 times daily as needed    Rosacea       mometasone 220 MCG/INH Inhaler    ASMANEX 30 METERED DOSES    3 Inhaler    Inhale 1 puff into the lungs daily    Intermittent asthma, uncomplicated       MULTI-VITAMIN PO      Take  by mouth. Occasionally        ranitidine 300 MG tablet    ZANTAC    90 tablet    Take 1 tablet (300 mg) by mouth At Bedtime    Gastroesophageal reflux disease, esophagitis presence not specified

## 2017-11-14 NOTE — NURSING NOTE
"Oncology Rooming Note    November 14, 2017 2:28 PM   Jose Wise is a 57 year old male who presents for:    Chief Complaint   Patient presents with     Oncology Clinic Visit     Return visit related to Tonsil Cancer     Initial Vitals: /83  Pulse 80  Temp 98.2  F (36.8  C) (Tympanic)  Resp 18  Ht 1.69 m (5' 6.53\")  Wt 71.7 kg (158 lb)  SpO2 96%  BMI 25.09 kg/m2 Estimated body mass index is 25.09 kg/(m^2) as calculated from the following:    Height as of this encounter: 1.69 m (5' 6.53\").    Weight as of this encounter: 71.7 kg (158 lb). Body surface area is 1.83 meters squared.  No Pain (0) Comment: Data Unavailable   No LMP for male patient.  Allergies reviewed: Yes  Medications reviewed: Yes    Medications: Medication refills not needed today.  Pharmacy name entered into EPIC:    [facility][id number] - [place], [place] - [address] [facility] [place] [address] [place] [address] [facility] [place] [place] [facility] [address] [facility] [place] [place], [place] - [address] [place] [address]    Clinical concerns: No new concerns. Provider was notified.    10 minutes for nursing intake (face to face time)     Cristal Villa LPN            "

## 2017-11-16 ENCOUNTER — MYC MEDICAL ADVICE (OUTPATIENT)
Dept: FAMILY MEDICINE | Facility: CLINIC | Age: 57
End: 2017-11-16

## 2017-11-16 NOTE — TELEPHONE ENCOUNTER
Huddle with provider - is okay to take Tums when also taking Zantac    Adelaide Arnold RN  [facility]

## 2018-01-15 ENCOUNTER — TRANSFERRED RECORDS (OUTPATIENT)
Dept: HEALTH INFORMATION MANAGEMENT | Facility: CLINIC | Age: 58
End: 2018-01-15

## 2018-03-07 ENCOUNTER — OFFICE VISIT (OUTPATIENT)
Dept: OTOLARYNGOLOGY | Facility: CLINIC | Age: 58
End: 2018-03-07
Payer: COMMERCIAL

## 2018-03-07 VITALS — WEIGHT: 156 LBS | BODY MASS INDEX: 24.48 KG/M2 | HEIGHT: 67 IN

## 2018-03-07 DIAGNOSIS — C10.9 MALIGNANT NEOPLASM OF OROPHARYNX (H): Primary | ICD-10-CM

## 2018-03-07 ASSESSMENT — PAIN SCALES - GENERAL: PAINLEVEL: NO PAIN (0)

## 2018-03-07 NOTE — NURSING NOTE
Chief Complaint   Patient presents with     RECHECK     6 month f/u     .Kanika Ray Medical Assistant

## 2018-03-07 NOTE — MR AVS SNAPSHOT
After Visit Summary   3/7/2018    Jose Wise    MRN: [id number]           Patient Information     Date Of Birth          1960        Visit Information        Provider Department      3/7/2018 4:20 PM Yazmin Bundy MD [facility]        Today's Diagnoses     Malignant neoplasm of oropharynx (H)    -  1      Care Instructions    1. Please follow-up in clinic in 9 months with Dr. Bundy.   2. Please call the ENT clinic with any questions,concerns, new or worsening symptoms.    -Clinic number is [phone]   - Rosa's direct line (Dr. Bundy's nurse) [phone]              Follow-ups after your visit        Your next 10 appointments already scheduled     May 14, 2018  4:30 PM CDT   (Arrive by 4:15 PM)   Return Visit with Sis Franco PA-C   [facility] ([facility])    [address]  [address]  [facility] [address] [phone]            Nov 13, 2018  3:30 PM CST   [facility] Lab Draw with  [facility] LAB DRAW   [facility] Lab Draw ([facility])    [address]  [address]  [facility] [address] [phone]            Nov 13, 2018  4:00 PM CST   (Arrive by 3:45 PM)   Return Visit with Janes Romo DO   [facility] ([facility])    [address]  [address]  [facility] [address] [phone]              Who to contact     Please call your clinic at [phone] to:    Ask questions about your health    Make or cancel appointments    Discuss your medicines    Learn about your test results    Speak to your doctor            Additional Information About Your Visit        MyChart Information     [employer] gives you secure access to your electronic health record. If you see a primary care provider, you can also send messages to your care team and make appointments. If you have questions, please call your  "primary care clinic.  If you do not have a primary care provider, please call [phone] and they will assist you.      [employer] is an electronic gateway that provides easy, online access to your medical records. With [employer], you can request a clinic appointment, read your test results, renew a prescription or communicate with your care team.     To access your existing account, please contact your [facility] or call [phone] for assistance.        Care EveryWhere ID     This is your Care EveryWhere ID. This could be used by other organizations to access your [place] medical records  [id number]        Your Vitals Were     Height BMI (Body Mass Index)                1.69 m (5' 6.53\") 24.78 kg/m2           Blood Pressure from Last 3 Encounters:   11/14/17 127/83   10/16/17 117/77   09/25/17 111/68    Weight from Last 3 Encounters:   03/07/18 70.8 kg (156 lb)   11/14/17 71.7 kg (158 lb)   10/16/17 71.9 kg (158 lb 8 oz)              We Performed the Following     [employer]STREAM RECORDING ORDER     IMAGESTREAM RECORDING ORDER        Primary Care Provider Office Phone # Fax #    Nicolás Morejon [phone]       [address] [address]        Equal Access to Services     ANUJA STEWART AH: Hadii aad ku hadasho Soomaali, waaxda luqadaha, qaybta kaalmada adeegyada, waxay idiin hayaan dorothy khreagan roman . So [facility] [phone].    ATENCIÓN: Si habla español, tiene a william disposición servicios gratuitos de asistencia lingüística. Llame al [phone].    We comply with applicable federal civil rights laws and [place] laws. We do not discriminate on the basis of race, color, national origin, age, disability, sex, sexual orientation, or gender identity.            Thank you!     Thank you for choosing [facility]  for your care. Our goal is always to provide you with excellent care. Hearing back from our patients is one way we can " continue to improve our services. Please take a few minutes to complete the written survey that you may receive in the mail after your visit with us. Thank you!             Your Updated Medication List - Protect others around you: Learn how to safely use, store and throw away your medicines at www.disposemymeds.org.          This list is accurate as of 3/7/18  7:52 PM.  Always use your most recent med list.                   Brand Name Dispense Instructions for use Diagnosis    albuterol 108 (90 BASE) MCG/ACT Inhaler    VENTOLIN HFA    1 Inhaler    Inhale 2 puffs into the lungs 4 times daily as needed    Intermittent asthma, uncomplicated       calcium carbonate 1250 MG tablet    OS-KAVYA 500 mg [place]. Ca     Take by mouth 2 times daily        fish Oil 1200 MG capsule           GLUCOSAMINE CHONDR COMPLEX PO      Take 1,000 mg by mouth        metroNIDAZOLE 0.75 % cream    METROCREAM    45 g    Apply topically 2 times daily as needed    Rosacea       mometasone 220 MCG/INH Inhaler    ASMANEX 30 METERED DOSES    3 Inhaler    Inhale 1 puff into the lungs daily    Intermittent asthma, uncomplicated       MULTI-VITAMIN PO      Take  by mouth. Occasionally        ranitidine 300 MG tablet    ZANTAC    90 tablet    Take 1 tablet (300 mg) by mouth At Bedtime    Gastroesophageal reflux disease, esophagitis presence not specified

## 2018-03-07 NOTE — PATIENT INSTRUCTIONS
1. Please follow-up in clinic in 9 months with Dr. Bundy.   2. Please call the ENT clinic with any questions,concerns, new or worsening symptoms.    -Clinic number is [phone]   - Rosa's direct line (Dr. Bundy's nurse) [phone]

## 2018-03-07 NOTE — LETTER
3/7/2018       RE: Jose Wise  [address]  [place] [address]     Dear Colleague,    Thank you for referring your patient, Jose Wise, to the [facility] EAR NOSE AND THROAT at [facility]. Please see a copy of my visit note below.    Dear Dr. Alvarez:    I had the pleasure of seeing Jose Wise in follow-up today at the [facility].     History of Present Illness:   Mr Wise is a 57 year old man with a history of a [id number] SCC of the right tonsil. He was treated with concurrent chemoradiation, completed 3/14/2014. His post-treatment PET showed complete treatment response.     Interval history:   He comes in today for follow-up.  He was last seen in clinic in September 2017.  At that point he was having some issues with his swallowing feeling like things are getting hung up near the back of his throat.  He had a video swallow study which showed penetration but no aspiration.  There is also evidence of reflux.  In January 2018 he had a upper endoscopy in [place] which showed 20 eosinophils per high-power field thought to be consistent with reflux.  She is recommended for course of omeprazole for 2 months.  He thinks his swallowing is about the same as his last visit.  He denies any sore throat, odynophagia, neck masses, otalgia.  His weight is stable. He saw Dr. Romo in November 2017 with the plan for  a repeat visit in 6 months and repeat imaging in a year.  He had his last set of imaging at that time which showed small pulmonary nodules with the plan to follow these closely. He is going to potentially move to [place] at least part of the year.    MEDICATIONS:     Current Outpatient Prescriptions   Medication Sig Dispense Refill     ranitidine (ZANTAC) 300 MG tablet Take 1 tablet (300 mg) by mouth At Bedtime 90 tablet 3     mometasone (ASMANEX 30 METERED DOSES) 220 MCG/INH Inhaler Inhale 1 puff into the lungs daily 3  Inhaler 3     metroNIDAZOLE (METROCREAM) 0.75 % cream Apply topically 2 times daily as needed 45 g 11     albuterol (VENTOLIN HFA) 108 (90 BASE) MCG/ACT inhaler Inhale 2 puffs into the lungs 4 times daily as needed 1 Inhaler 11     calcium carbonate (OS-KAVYA 500 MG [place]. [place]) 500 MG tablet Take by mouth 2 times daily       Glucosamine-Chondroitin (GLUCOSAMINE CHONDR COMPLEX PO) Take 1,000 mg by mouth       Omega-3 Fatty Acids (FISH OIL) 1200 MG CAPS        Multiple Vitamin (MULTI-VITAMIN PO) Take  by mouth. Occasionally           ALLERGIES:    Allergies   Allergen Reactions     Animal Dander Itching     Pet hair causes watery eyes and sneezing     Grass Itching     Red tipped grasses, sneezing and watery eyes       HABITS/SOCIAL HISTORY:     Quit smoking years ago    PAST MEDICAL HISTORY:   Past Medical History:   Diagnosis Date     Asthma      Benign positional vertigo      Chemical dependency (H)      Communic dis contact NEC      Gastroesophageal reflux disease      History of radiation therapy      Hoarseness      Impacted cerumen 10/14/2013     Impaired fasting glucose 9/4/2012     Lateral epicondylitis  of elbow      Pain in joint, upper arm      Pure hypercholesterolemia      Rosacea 12/5/2012     Seasonal allergies      Spasm of muscle      Substance abuse     POLYSUBSTANCE     Tension headache, chronic 10/17/2012     Tinea versicolor 12/5/2012     Tinnitus      Tonsillar cancer (H) 2013    chemo and radiation     Unspecified asthma(493.90)     takes daily inhaler, never been intubated, no attacks  since 2012        FAMILY HISTORY:    Family History   Problem Relation Age of Onset     Circulatory Mother 45     cerebral aneurysm     Alcohol/Drug Mother      sibling     Other - See Comments Mother      aneurysm     Substance Abuse Mother      CANCER Mother      Cardiovascular Father      Heart Attack     Coronary Artery Disease Father      heart attack/ triple bypass     CANCER Father       "skin cancer     Skin Cancer Father      Other Cancer Brother      upper thorax     Substance Abuse Brother      CANCER Sister      cervical     CANCER Brother      esophagus,  64     GASTROINTESTINAL DISEASE Son      crohn's disease/ colostomy     CANCER Sister      CANCER Brother      Melanoma No family hx of        REVIEW OF SYSTEMS:  12 point ROS was negative other than the symptoms noted above in the HPI.  Patient Supplied Answers to Review of Systems  [facility] ROS 2018   Constitutional -   Neurology Dizzy spells, Headache   Ears, Nose, Throat Ringing/noise in ears   Gastrointestinal/Genitourinary -   Musculoskeletal -   Allergy/Immunology -   Endocrine -         PHYSICAL EXAMINATION:   Ht 1.69 m (5' 6.53\")  Wt 70.8 kg (156 lb)  BMI 24.78 kg/m2  Appearance:   normal; NAD, age-appropriate appearance, well-developed, normal habitus   Communication:   normal; communicates verbally, normal voice quality   Head/Face:   inspection -  Normal; no scars or visible lesions   Salivary glands -  Normal size, no tenderness, swelling, or palpable masses   Facial strength -  Normal and symmetric bilateral; H/B I/VI   Skin:  normal, no rash   Ocular Motility:  normal occular movements   Ears:      auricle (AD) -  normal  EAC (AD) -  normal  TM (AD) -  Normal, no effusion  auricle (AS) -  normal  EAC (AS) -  normal  TM (AS) -  Normal, no effusion  Normal clinical speech reception   Nose:  Ext. inspection -  Normal  Internal Inspection -  Normal mucosa, septum, and turbinates   Nasopharynx:  normal mucosa, no masses   Oral Cavity:  lips -  Normal mucosa, oral competence, and stoma size   Age-appropriate dentition, healthy gingival mucosa   Hard palate, buccal, floor of mouth mucosa normal   Tongue - normal movement, no lesions, normal sensation, no palpable BOT masses   Oropharynx:  mucosa -  Normal, no lesions  soft palate -  Normal, no lesions, no asymmetry, normal elevation  No palpable masses in BOT or tonsillar " fossa   Hypopharynx:  Normal pyriform sinus and pharyngeal wall mucosa with radiation changes   No pooled secretions    Larynx:  Epiglottis, false vocal cord, true vocal cord normal in appearance, bilaterally mobile cords  Radiation changes to mucosa   Neck: No visible mass or asymmetry   No palpable mass  Mild radiation fibrosis  Normal range of motion   Lymphatic:  no abnormal nodes   Cardiovascular: warm, pink, well-perfused extremities without swelling, tenderness, or edema   Respiratory: Normal respiratory effort, no stridor   Neuro/Psych.:  mood/affect -  normal  mental status -  normal  cranial nerves -  normal        PROCEDURES:   Flexible fiberoptic laryngoscopy: Verbal consent was obtained. The nasal cavity was prepped with an aerosolized solution of topical anesthetic and vasoconstrictive agent. The scope was passed through the anterior nasal cavity and advanced. Inspection of the nasopharynx revealed no gross abnormality. Inspection of the larynx revealed bilaterally mobile vocal cords. Pyriform sinuses are symmetric. No intra-arytenoid irregularities noted. The epiglottis, aryepiglottic folds, vallecula and base of tongue are unremarkable. There are radiation changes to the mucosa. The airway is patent. Procedure tolerated well with no immediate complications noted.    RESULTS REVIEWED:  I reviewed the swallow study results as well as the operative note from the endoscopy as summarized above    IMPRESSION AND PLAN:   Mr Wise has a history of oropharyngeal cancer, 4 years from his treatment. He has no evidence of recurrence at this time.  We reviewed the importance of good dental care.  He should continue doing swallowing exercises and working on appropriate modifications to minimize the risk of aspiration.  He is due for repeat imaging in November 2018 per Dr. Romo.  I will plan on seeing him back in about 9 months.    Thank you very much for the opportunity to participate in the care of your  patient.      Yazmin Bundy M.D.  Otolaryngology- Head & Neck Surgery      CC:  Janes Romo   of Medicine  Division of Hematology, Oncology, and Transplantation    Valery Carreon MD  Department of Radiation Oncology  [facility]

## 2018-03-08 NOTE — PROGRESS NOTES
Dear Dr. Alvarez:    I had the pleasure of seeing Jose Wise in follow-up today at the [facility] Ot[facility].     History of Present Illness:   Mr Wise is a 57 year old man with a history of a [id number] SCC of the right tonsil. He was treated with concurrent chemoradiation, completed 3/14/2014. His post-treatment PET showed complete treatment response.     Interval history:   He comes in today for follow-up.  He was last seen in clinic in September 2017.  At that point he was having some issues with his swallowing feeling like things are getting hung up near the back of his throat.  He had a video swallow study which showed penetration but no aspiration.  There is also evidence of reflux.  In January 2018 he had a upper endoscopy in [place] which showed 20 eosinophils per high-power field thought to be consistent with reflux.  She is recommended for course of omeprazole for 2 months.  He thinks his swallowing is about the same as his last visit.  He denies any sore throat, odynophagia, neck masses, otalgia.  His weight is stable. He saw Dr. Romo in November 2017 with the plan for  a repeat visit in 6 months and repeat imaging in a year.  He had his last set of imaging at that time which showed small pulmonary nodules with the plan to follow these closely. He is going to potentially move to [place] at least part of the year.    MEDICATIONS:     Current Outpatient Prescriptions   Medication Sig Dispense Refill     ranitidine (ZANTAC) 300 MG tablet Take 1 tablet (300 mg) by mouth At Bedtime 90 tablet 3     mometasone (ASMANEX 30 METERED DOSES) 220 MCG/INH Inhaler Inhale 1 puff into the lungs daily 3 Inhaler 3     metroNIDAZOLE (METROCREAM) 0.75 % cream Apply topically 2 times daily as needed 45 g 11     albuterol (VENTOLIN HFA) 108 (90 BASE) MCG/ACT inhaler Inhale 2 puffs into the lungs 4 times daily as needed 1 Inhaler 11     calcium carbonate (OS-KAVYA 500 MG [place]. [place]) 500 MG  tablet Take by mouth 2 times daily       Glucosamine-Chondroitin (GLUCOSAMINE CHONDR COMPLEX PO) Take 1,000 mg by mouth       Omega-3 Fatty Acids (FISH OIL) 1200 MG CAPS        Multiple Vitamin (MULTI-VITAMIN PO) Take  by mouth. Occasionally           ALLERGIES:    Allergies   Allergen Reactions     Animal Dander Itching     Pet hair causes watery eyes and sneezing     Grass Itching     Red tipped grasses, sneezing and watery eyes       HABITS/SOCIAL HISTORY:     Quit smoking years ago    PAST MEDICAL HISTORY:   Past Medical History:   Diagnosis Date     Asthma      Benign positional vertigo      Chemical dependency (H)      Communic dis contact NEC      Gastroesophageal reflux disease      History of radiation therapy      Hoarseness      Impacted cerumen 10/14/2013     Impaired fasting glucose 2012     Lateral epicondylitis  of elbow      Pain in joint, upper arm      Pure hypercholesterolemia      Rosacea 2012     Seasonal allergies      Spasm of muscle      Substance abuse     POLYSUBSTANCE     Tension headache, chronic 10/17/2012     Tinea versicolor 2012     Tinnitus      Tonsillar cancer (H)     chemo and radiation     Unspecified asthma(493.90)     takes daily inhaler, never been intubated, no attacks  since         FAMILY HISTORY:    Family History   Problem Relation Age of Onset     Circulatory Mother 45     cerebral aneurysm     Alcohol/Drug Mother      sibling     Other - See Comments Mother      aneurysm     Substance Abuse Mother      CANCER Mother      Cardiovascular Father      Heart Attack     Coronary Artery Disease Father      heart attack/ triple bypass     CANCER Father      skin cancer     Skin Cancer Father      Other Cancer Brother      upper thorax     Substance Abuse Brother      CANCER Sister      cervical     CANCER Brother      esophagus,  64     GASTROINTESTINAL DISEASE Son      crohn's disease/ colostomy     CANCER Sister      CANCER Brother  "     Melanoma No family hx of        REVIEW OF SYSTEMS:  12 point ROS was negative other than the symptoms noted above in the HPI.  Patient Supplied Answers to Review of Systems  [facility] ROS 2/27/2018   Constitutional -   Neurology Dizzy spells, Headache   Ears, Nose, Throat Ringing/noise in ears   Gastrointestinal/Genitourinary -   Musculoskeletal -   Allergy/Immunology -   Endocrine -         PHYSICAL EXAMINATION:   Ht 1.69 m (5' 6.53\")  Wt 70.8 kg (156 lb)  BMI 24.78 kg/m2  Appearance:   normal; NAD, age-appropriate appearance, well-developed, normal habitus   Communication:   normal; communicates verbally, normal voice quality   Head/Face:   inspection -  Normal; no scars or visible lesions   Salivary glands -  Normal size, no tenderness, swelling, or palpable masses   Facial strength -  Normal and symmetric bilateral; H/B I/VI   Skin:  normal, no rash   Ocular Motility:  normal occular movements   Ears:      auricle (AD) -  normal  EAC (AD) -  normal  TM (AD) -  Normal, no effusion  auricle (AS) -  normal  EAC (AS) -  normal  TM (AS) -  Normal, no effusion  Normal clinical speech reception   Nose:  Ext. inspection -  Normal  Internal Inspection -  Normal mucosa, septum, and turbinates   Nasopharynx:  normal mucosa, no masses   Oral Cavity:  lips -  Normal mucosa, oral competence, and stoma size   Age-appropriate dentition, healthy gingival mucosa   Hard palate, buccal, floor of mouth mucosa normal   Tongue - normal movement, no lesions, normal sensation, no palpable BOT masses   Oropharynx:  mucosa -  Normal, no lesions  soft palate -  Normal, no lesions, no asymmetry, normal elevation  No palpable masses in BOT or tonsillar fossa   Hypopharynx:  Normal pyriform sinus and pharyngeal wall mucosa with radiation changes   No pooled secretions    Larynx:  Epiglottis, false vocal cord, true vocal cord normal in appearance, bilaterally mobile cords  Radiation changes to mucosa   Neck: No visible mass or asymmetry "   No palpable mass  Mild radiation fibrosis  Normal range of motion   Lymphatic:  no abnormal nodes   Cardiovascular: warm, pink, well-perfused extremities without swelling, tenderness, or edema   Respiratory: Normal respiratory effort, no stridor   Neuro/Psych.:  mood/affect -  normal  mental status -  normal  cranial nerves -  normal        PROCEDURES:   Flexible fiberoptic laryngoscopy: Verbal consent was obtained. The nasal cavity was prepped with an aerosolized solution of topical anesthetic and vasoconstrictive agent. The scope was passed through the anterior nasal cavity and advanced. Inspection of the nasopharynx revealed no gross abnormality. Inspection of the larynx revealed bilaterally mobile vocal cords. Pyriform sinuses are symmetric. No intra-arytenoid irregularities noted. The epiglottis, aryepiglottic folds, vallecula and base of tongue are unremarkable. There are radiation changes to the mucosa. The airway is patent. Procedure tolerated well with no immediate complications noted.    RESULTS REVIEWED:  I reviewed the swallow study results as well as the operative note from the endoscopy as summarized above    IMPRESSION AND PLAN:   Mr Wise has a history of oropharyngeal cancer, 4 years from his treatment. He has no evidence of recurrence at this time.  We reviewed the importance of good dental care.  He should continue doing swallowing exercises and working on appropriate modifications to minimize the risk of aspiration.  He is due for repeat imaging in November 2018 per Dr. Romo.  I will plan on seeing him back in about 9 months.    Thank you very much for the opportunity to participate in the care of your patient.      Yazmin Bundy M.D.  Otolaryngology- Head & Neck Surgery      CC:  Janes Romo   of Medicine  Division of Hematology, Oncology, and Transplantation    Valery Carreon MD  Department of Radiation Oncology  [facility]

## 2018-05-14 ENCOUNTER — ONCOLOGY VISIT (OUTPATIENT)
Dept: ONCOLOGY | Facility: CLINIC | Age: 58
End: 2018-05-14
Attending: INTERNAL MEDICINE
Payer: COMMERCIAL

## 2018-05-14 ENCOUNTER — APPOINTMENT (OUTPATIENT)
Dept: LAB | Facility: CLINIC | Age: 58
End: 2018-05-14
Attending: INTERNAL MEDICINE
Payer: COMMERCIAL

## 2018-05-14 VITALS
SYSTOLIC BLOOD PRESSURE: 122 MMHG | DIASTOLIC BLOOD PRESSURE: 80 MMHG | HEART RATE: 71 BPM | WEIGHT: 156.9 LBS | OXYGEN SATURATION: 97 % | HEIGHT: 67 IN | RESPIRATION RATE: 18 BRPM | TEMPERATURE: 98.4 F | BODY MASS INDEX: 24.63 KG/M2

## 2018-05-14 DIAGNOSIS — C09.0 MALIGNANT NEOPLASM OF TONSILLAR FOSSA (H): ICD-10-CM

## 2018-05-14 LAB
ALBUMIN SERPL-MCNC: 3.8 G/DL (ref 3.4–5)
ALP SERPL-CCNC: 83 U/L (ref 40–150)
ALT SERPL W P-5'-P-CCNC: 24 U/L (ref 0–70)
ANION GAP SERPL CALCULATED.3IONS-SCNC: 6 MMOL/L (ref 3–14)
AST SERPL W P-5'-P-CCNC: 16 U/L (ref 0–45)
BASOPHILS # BLD AUTO: 0 10E9/L (ref 0–0.2)
BASOPHILS NFR BLD AUTO: 0.9 %
BILIRUB SERPL-MCNC: 0.4 MG/DL (ref 0.2–1.3)
BUN SERPL-MCNC: 16 MG/DL (ref 7–30)
CALCIUM SERPL-MCNC: 8.8 MG/DL (ref 8.5–10.1)
CHLORIDE SERPL-SCNC: 106 MMOL/L (ref 94–109)
CO2 SERPL-SCNC: 26 MMOL/L (ref 20–32)
CREAT SERPL-MCNC: 0.99 MG/DL (ref 0.66–1.25)
DIFFERENTIAL METHOD BLD: NORMAL
EOSINOPHIL # BLD AUTO: 0.1 10E9/L (ref 0–0.7)
EOSINOPHIL NFR BLD AUTO: 2.9 %
ERYTHROCYTE [DISTWIDTH] IN BLOOD BY AUTOMATED COUNT: 12.5 % (ref 10–15)
GFR SERPL CREATININE-BSD FRML MDRD: 77 ML/MIN/1.7M2
GLUCOSE SERPL-MCNC: 85 MG/DL (ref 70–99)
HCT VFR BLD AUTO: 40.8 % (ref 40–53)
HGB BLD-MCNC: 14.2 G/DL (ref 13.3–17.7)
IMM GRANULOCYTES # BLD: 0 10E9/L (ref 0–0.4)
IMM GRANULOCYTES NFR BLD: 0.4 %
LYMPHOCYTES # BLD AUTO: 1.1 10E9/L (ref 0.8–5.3)
LYMPHOCYTES NFR BLD AUTO: 24.5 %
MCH RBC QN AUTO: 31.7 PG (ref 26.5–33)
MCHC RBC AUTO-ENTMCNC: 34.8 G/DL (ref 31.5–36.5)
MCV RBC AUTO: 91 FL (ref 78–100)
MONOCYTES # BLD AUTO: 0.5 10E9/L (ref 0–1.3)
MONOCYTES NFR BLD AUTO: 11 %
NEUTROPHILS # BLD AUTO: 2.7 10E9/L (ref 1.6–8.3)
NEUTROPHILS NFR BLD AUTO: 60.3 %
NRBC # BLD AUTO: 0 10*3/UL
NRBC BLD AUTO-RTO: 0 /100
PLATELET # BLD AUTO: 185 10E9/L (ref 150–450)
POTASSIUM SERPL-SCNC: 3.9 MMOL/L (ref 3.4–5.3)
PROT SERPL-MCNC: 6.7 G/DL (ref 6.8–8.8)
RBC # BLD AUTO: 4.48 10E12/L (ref 4.4–5.9)
SODIUM SERPL-SCNC: 138 MMOL/L (ref 133–144)
TSH SERPL DL<=0.005 MIU/L-ACNC: 3.23 MU/L (ref 0.4–4)
WBC # BLD AUTO: 4.5 10E9/L (ref 4–11)

## 2018-05-14 PROCEDURE — 84443 ASSAY THYROID STIM HORMONE: CPT | Performed by: INTERNAL MEDICINE

## 2018-05-14 PROCEDURE — 99214 OFFICE O/P EST MOD 30 MIN: CPT | Mod: ZP | Performed by: PHYSICIAN ASSISTANT

## 2018-05-14 PROCEDURE — 80053 COMPREHEN METABOLIC PANEL: CPT | Performed by: INTERNAL MEDICINE

## 2018-05-14 PROCEDURE — G0463 HOSPITAL OUTPT CLINIC VISIT: HCPCS | Mod: ZF

## 2018-05-14 PROCEDURE — 36415 COLL VENOUS BLD VENIPUNCTURE: CPT

## 2018-05-14 PROCEDURE — 85025 COMPLETE CBC W/AUTO DIFF WBC: CPT | Performed by: INTERNAL MEDICINE

## 2018-05-14 ASSESSMENT — PAIN SCALES - GENERAL: PAINLEVEL: NO PAIN (0)

## 2018-05-14 NOTE — LETTER
5/14/2018      RE: Jose Wise  [address]  [place] [address]       REASON FOR VISIT:    CANCER STAGE: MALIG NEOPLASM TONSILLAR FOSSA    Staging form: Pharynx - Oropharynx, AJCC 7th Edition      Clinical: Stage KWADWO (T2, N2b, M0) - Signed by Janes Romo DO on 11/8/2016     HISTORY OF PRESENT ILLNESS:  - 10/2013 lump in his R neck.  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 There was no LN element so it could be a soft tissue mass.  - 1/9/14 He was referred to Dr. Jayashree Alvarez at [facility] who perfromed a laryngoscopy on  revealed enlarged R tonsil.    - 1/15/14 Direct laryngoscopy and a biopsy of the R tonsil was identified squamous cell carcinoma.  P16+.     - 1/9/14 PET/CT showed a hypermetabolic tonsillar mass and 2 hypermetabolic LNs on the R side in level 2, measured 2.1cm.  No evidence of distant mets, but multiple tiny pulmonary nodules were seen, felt unlikely to be mets.     -1/17/14 he consulted radiation oncology and Dr. Romo in medical oncology.     -1/27/14 started on chemorads with HD cisplatin. He developed ototoxicity and was switched to carboplatin (AUC 6) for day 22.  He did not receive carboplatin day 43 due to thrombocytopenia.    Radiation therapy completed on 3/14/14 (7000cGy).    - His PET/CT after treatment showed a complete response.     INTERVAL HISTORY:  Jose returns today for 6 month followup. He is feeling well. He had some reflux end of the year and had an EGD early January showing reflux esophagitis. He started on omeprazole per GI for 2 months. He then stopped this and resumed prior ranitidine. Reflux is not as well-controlled. He has no odynophagia or worsening dysphagia. No palpable LAD or pain. Dry mouth is stable. He has good energy and appetite. No major infections past 6 months. No cough, SOB. No headaches or new or different pain.      Review Of Systems  10-point review of systems were negative except as noted in HPI.    EXAM:  Blood pressure  "122/80, pulse 71, temperature 98.4  F (36.9  C), temperature source Oral, resp. rate 18, height 1.69 m (5' 6.53\"), weight 71.2 kg (156 lb 14.4 oz), SpO2 97 %.   Wt Readings from Last 10 Encounters:   05/14/18 71.2 kg (156 lb 14.4 oz)   03/07/18 70.8 kg (156 lb)   11/14/17 71.7 kg (158 lb)   10/16/17 71.9 kg (158 lb 8 oz)   09/25/17 71.7 kg (158 lb)   09/06/17 71.2 kg (157 lb)   05/16/17 71.9 kg (158 lb 8 oz)   05/03/17 72.1 kg (159 lb)   02/01/17 71.2 kg (157 lb)   11/09/16 70.8 kg (156 lb)   GEN: alert and oriented x 3, nad  HEENT: perrla, eomi, sclera anicteric, oral mucosa moist without thrush or lesions  NECK: supple, no palpable LAD or masses  HT: reg rate and rhythm, no murmurs, or gallops  LUNGS: clear to auscultation bilaterally  ABD: soft, nt, nd, +bs x 4, no HSM appreciated  EXT: no clubbing, cyanosis, or edema  NEURO: CN 2-12 grossly intact    Current Outpatient Prescriptions   Medication Sig Dispense Refill     albuterol (VENTOLIN HFA) 108 (90 BASE) MCG/ACT inhaler Inhale 2 puffs into the lungs 4 times daily as needed 1 Inhaler 11     calcium carbonate (OS-KAVYA 500 MG [place]. [place]) 500 MG tablet Take by mouth 2 times daily       Glucosamine-Chondroitin (GLUCOSAMINE CHONDR COMPLEX PO) Take 1,000 mg by mouth       metroNIDAZOLE (METROCREAM) 0.75 % cream Apply topically 2 times daily as needed 45 g 11     mometasone (ASMANEX 30 METERED DOSES) 220 MCG/INH Inhaler Inhale 1 puff into the lungs daily 3 Inhaler 3     Multiple Vitamin (MULTI-VITAMIN PO) Take  by mouth. Occasionally         Omega-3 Fatty Acids (FISH OIL) 1200 MG CAPS        OMEGA-3 FATTY ACIDS-VITAMIN E PO Take 1,000 capsules by mouth       ranitidine (ZANTAC) 300 MG tablet Take 1 tablet (300 mg) by mouth At Bedtime 90 tablet 3     UNABLE TO FIND        LABS:   5/14/2018 15:49   Sodium 138   Potassium 3.9   Chloride 106   Carbon Dioxide 26   Urea Nitrogen 16   Creatinine 0.99   GFR Estimate 77   GFR Estimate If Black >90   Calcium 8.8   Anion Gap 6 "   Albumin 3.8   Protein Total 6.7 (L)   Bilirubin Total 0.4   Alkaline Phosphatase 83   ALT 24   AST 16   TSH 3.23   Glucose 85   WBC 4.5   Hemoglobin 14.2   Hematocrit 40.8   Platelet Count 185   RBC Count 4.48   MCV 91   MCH 31.7   MCHC 34.8   RDW 12.5   Diff Method Automated Method   % Neutrophils 60.3   % Lymphocytes 24.5   % Monocytes 11.0   % Eosinophils 2.9   % Basophils 0.9   % Immature Granulocytes 0.4   Nucleated RBCs 0   Absolute Neutrophil 2.7   Absolute Lymphocytes 1.1   Absolute Monocytes 0.5   Absolute Eosinophils 0.1   Absolute Basophils 0.0   Abs Immature Granulocytes 0.0   Absolute Nucleated RBC 0.0       Assessment/Plan  R tonsil cancer [id number] - HPV+ :  No clinical evidence of recurrence at this time. His labs are stable with normal TSH. He is ~4 years out from treatment. Follow-up CT NCAP, labs in 6 months with follow-up with Dr. Romo.     Pulmonary nodules - Had minimal enlargement of one nodule in LLL. Will be followed on CT at next visit.     Reflux: Discussed rebound reflux is common on PPI. However Dr. Stewart from [facility] had wanted to see him if he was still having issues. He will call for an appt. Could dose ranitidine 150 mg BID to see if this gives better symptom control.     Sis Franco PA-C    [facility]  [address] [place] [id number] [phone]

## 2018-05-14 NOTE — MR AVS SNAPSHOT
After Visit Summary   5/14/2018    Jose Wise    MRN: [id number]           Patient Information     Date Of Birth          1960        Visit Information        Provider Department      5/14/2018 4:30 PM Sis Franco PA-C [facility]        Today's Diagnoses     MALIG NEOPLASM TONSILLAR FOSSA           Follow-ups after your visit        Your next 10 appointments already scheduled     Nov 13, 2018 12:40 PM CST   CT SOFT TISSUE NECK W/O & W CONTRAST with UCCT1   [facility] ([facility])    [address] [address] [phone]           Please bring any scans or X-rays taken at other hospitals, if similar tests were done. Also bring a list of your medicines, including vitamins, minerals and over-the-counter drugs. It is safest to leave personal items at home.  Be sure to tell your doctor:   If you have any allergies.   If there s any chance you are pregnant.   If you are breastfeeding.    If you have diabetes as your medication may need to be adjusted for this exam.  You will have contrast for this exam. To prepare:   Do not eat or drink for 2 hours before your exam. If you need to take medicine, you may take it with small sips of water. (We may ask you to take liquid medicine as well.)   The day before your exam, drink extra fluids at least six 8-ounce glasses (unless your doctor tells you to restrict your fluids).  Patients over 70 or patients with diabetes or kidney problems:   If you haven t had a blood test (creatinine test) within the last 30 days, the Cardiologist/Radiologist may require you to get this test prior to your exam.  Please wear loose clothing, such as a sweat suit or jogging clothes. Avoid snaps, zippers and other metal. We may ask you to undress and put on a hospital gown.  If you have any questions, please call the Imaging Department where you will have your exam.             Nov 13, 2018  1:00 PM CST   CT CHEST/ABDOMEN/PELVIS W CONTRAST with UCCT1   [facility] [place] [facility] ([facility] [place])    [address]  1st Floor  [facility] [id number] [phone]           Please bring any scans or X-rays taken at other hospitals, if similar tests were done. Also bring a list of your medicines, including vitamins, minerals and over-the-counter drugs. It is safest to leave personal items at home.  Be sure to tell your doctor:   If you have any allergies.   If there s any chance you are pregnant.   If you are breastfeeding.  How to prepare:   Do not eat or drink for 2 hours before your exam. If you need to take medicine, you may take it with small sips of water. (We may ask you to take liquid medicine as well.)   Please wear loose clothing, such as a sweat suit or jogging clothes. Avoid snaps, zippers and other metal. We may ask you to undress and put on a hospital gown.  Please arrive 30 minutes early for your CT. Once in the department you might be asked to drink water 15-20 minutes prior to your exam.  If indicated you may be asked to drink an oral contrast in advance of your CT.  If this is the case, the imaging team will let you know or be in contact with you prior to your appointment  Patients over 70 or patients with diabetes or kidney problems:   If you haven t had a blood test (creatinine test) within the last 30 days, the Cardiologist/Radiologist may require you to get this test prior to your exam.  If you have diabetes:   Continue to take your metformin medication on the day of your exam  If you have any questions, please call the Imaging Department where you will have your exam.            Nov 13, 2018  3:30 PM CST   [facility] Lab Draw with  MAS[facility]IC LAB DRAW   [facility] ([facility])    [address]  [address]  [facility] [id number] [phone]            Nov 13, 2018  4:00 PM CST   (Arrive by  "3:45 PM)   Return Visit with Janes Romo DO   [facility] ([facility] [place])    [address]  [address]  [facility] [address] [phone]              Who to contact     If you have questions or need follow up information about today's clinic visit or your schedule please contact [facility] directly at [phone].  Normal or non-critical lab and imaging results will be communicated to you by [employer], letter or phone within 4 business days after the clinic has received the results. If you do not hear from us within 7 days, please contact the clinic through Pinticst or phone. If you have a critical or abnormal lab result, we will notify you by phone as soon as possible.  Submit refill requests through JumpChat or call your pharmacy and they will forward the refill request to us. Please allow 3 business days for your refill to be completed.          Additional Information About Your Visit        JumpChat Information     JumpChat gives you secure access to your electronic health record. If you see a primary care provider, you can also send messages to your care team and make appointments. If you have questions, please call your primary care clinic.  If you do not have a primary care provider, please call [phone] and they will assist you.        Care EveryWhere ID     This is your Care EveryWhere ID. This could be used by other organizations to access your [facility] medical records  [id number]        Your Vitals Were     Pulse Temperature Respirations Height Pulse Oximetry BMI (Body Mass Index)    71 98.4  F (36.9  C) (Oral) 18 1.69 m (5' 6.53\") 97% 24.92 kg/m2       Blood Pressure from Last 3 Encounters:   05/14/18 122/80   11/14/17 127/83   10/16/17 117/77    Weight from Last 3 Encounters:   05/14/18 71.2 kg (156 lb 14.4 oz)   03/07/18 70.8 kg (156 lb)   11/14/17 71.7 kg (158 lb)              We Performed the Following     *CBC with " platelets differential     Comprehensive metabolic panel     TSH        Primary Care Provider Office Phone # Fax #    Nicolás Morejon [phone]       [address] [facility] [address]        Equal Access to Services     ANUJA STEWART : Hadember simeon lebron blancao Sojoseali, waaxda luqadaha, qaybta kaalmada kevin, opal gonzalez laSheilaget duron. So [facility] [phone].    ATENCIÓN: Si habla español, tiene a william disposición servicios gratuitos de asistencia lingüística. Llame al [phone].    We comply with applicable federal civil rights laws and [place] laws. We do not discriminate on the basis of race, color, national origin, age, disability, sex, sexual orientation, or gender identity.            Thank you!     Thank you for choosing [facility]  for your care. Our goal is always to provide you with excellent care. Hearing back from our patients is one way we can continue to improve our services. Please take a few minutes to complete the written survey that you may receive in the mail after your visit with us. Thank you!             Your Updated Medication List - Protect others around you: Learn how to safely use, store and throw away your medicines at www.disposemymeds.org.          This list is accurate as of 5/14/18 11:59 PM.  Always use your most recent med list.                   Brand Name Dispense Instructions for use Diagnosis    albuterol 108 (90 Base) MCG/ACT Inhaler    VENTOLIN HFA    1 Inhaler    Inhale 2 puffs into the lungs 4 times daily as needed    Intermittent asthma, uncomplicated       calcium carbonate 500 MG tablet    OS-KAVYA 500 mg [place]. Ca     Take by mouth 2 times daily        fish Oil 1200 MG capsule           GLUCOSAMINE CHONDR COMPLEX PO      Take 1,000 mg by mouth        metroNIDAZOLE 0.75 % cream    METROCREAM    45 g    Apply topically 2 times daily as needed    Rosacea       mometasone 220 MCG/INH Inhaler    ASMANEX 30  METERED DOSES    3 Inhaler    Inhale 1 puff into the lungs daily    Intermittent asthma, uncomplicated       MULTI-VITAMIN PO      Take  by mouth. Occasionally        OMEGA-3 FATTY ACIDS-VITAMIN E PO      Take 1,000 capsules by mouth        ranitidine 300 MG tablet    ZANTAC    90 tablet    Take 1 tablet (300 mg) by mouth At Bedtime    Gastroesophageal reflux disease, esophagitis presence not specified       UNABLE TO FIND

## 2018-05-14 NOTE — NURSING NOTE
"Oncology Rooming Note    May 14, 2018 4:20 PM   Jose Wise is a 57 year old male who presents for:    Chief Complaint   Patient presents with     Blood Draw     Labs drawn via  by RN. VS taken.     Oncology Clinic Visit     6 month f/u [place]     Initial Vitals: /80 (BP Location: Right arm, Patient Position: Sitting, Cuff Size: Adult Regular)  Pulse 71  Temp 98.4  F (36.9  C) (Oral)  Resp 18  Ht 1.69 m (5' 6.53\")  Wt 71.2 kg (156 lb 14.4 oz)  SpO2 97%  BMI 24.92 kg/m2 Estimated body mass index is 24.92 kg/(m^2) as calculated from the following:    Height as of this encounter: 1.69 m (5' 6.53\").    Weight as of this encounter: 71.2 kg (156 lb 14.4 oz). Body surface area is 1.83 meters squared.  No Pain (0) Comment: Data Unavailable   No LMP for male patient.  Allergies reviewed: Yes  Medications reviewed: Yes    Medications: Medication refills not needed today.  Pharmacy name entered into EPIC:    [facility]  [id number] [place] - [address]  [facility] [place] [address] [place] [address] [facility] [place] [facility] [address] [place] - [place], [place] - [id number]  [facility] [place] - [address] [place] [address] AT [address]SECTION [address]    Clinical concerns: none Sis was NOT notified.    10 minutes for nursing intake (face to face time)     SINDY GALINDO LPN            "

## 2018-05-14 NOTE — NURSING NOTE
Chief Complaint   Patient presents with     Blood Draw     Labs drawn via  by RN. VS taken.     Radha Recio RN

## 2018-05-14 NOTE — PROGRESS NOTES
REASON FOR VISIT:    CANCER STAGE: MALIG NEOPLASM TONSILLAR FOSSA    Staging form: Pharynx - Oropharynx, AJCC 7th Edition      Clinical: Stage KWADWO (T2, N2b, M0) - Signed by Janes Romo DO on 11/8/2016     HISTORY OF PRESENT ILLNESS:  - 10/2013 lump in his R neck.  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 There was no LN element so it could be a soft tissue mass.  - 1/9/14 He was referred to Dr. Jayashree Alvarez at [facility] who perfromed a laryngoscopy on  revealed enlarged R tonsil.    - 1/15/14 Direct laryngoscopy and a biopsy of the R tonsil was identified squamous cell carcinoma.  P16+.     - 1/9/14 PET/CT showed a hypermetabolic tonsillar mass and 2 hypermetabolic LNs on the R side in level 2, measured 2.1cm.  No evidence of distant mets, but multiple tiny pulmonary nodules were seen, felt unlikely to be mets.     -1/17/14 he consulted radiation oncology and Dr. Romo in medical oncology.     -1/27/14 started on chemorads with HD cisplatin. He developed ototoxicity and was switched to carboplatin (AUC 6) for day 22.  He did not receive carboplatin day 43 due to thrombocytopenia.    Radiation therapy completed on 3/14/14 (7000cGy).    - His PET/CT after treatment showed a complete response.     INTERVAL HISTORY:  Jose returns today for 6 month followup. He is feeling well. He had some reflux end of the year and had an EGD early January showing reflux esophagitis. He started on omeprazole per GI for 2 months. He then stopped this and resumed prior ranitidine. Reflux is not as well-controlled. He has no odynophagia or worsening dysphagia. No palpable LAD or pain. Dry mouth is stable. He has good energy and appetite. No major infections past 6 months. No cough, SOB. No headaches or new or different pain.      Review Of Systems  10-point review of systems were negative except as noted in HPI.    EXAM:  Blood pressure 122/80, pulse 71, temperature 98.4  F (36.9  C), temperature source Oral,  "resp. rate 18, height 1.69 m (5' 6.53\"), weight 71.2 kg (156 lb 14.4 oz), SpO2 97 %.   Wt Readings from Last 10 Encounters:   05/14/18 71.2 kg (156 lb 14.4 oz)   03/07/18 70.8 kg (156 lb)   11/14/17 71.7 kg (158 lb)   10/16/17 71.9 kg (158 lb 8 oz)   09/25/17 71.7 kg (158 lb)   09/06/17 71.2 kg (157 lb)   05/16/17 71.9 kg (158 lb 8 oz)   05/03/17 72.1 kg (159 lb)   02/01/17 71.2 kg (157 lb)   11/09/16 70.8 kg (156 lb)   GEN: alert and oriented x 3, nad  HEENT: perrla, eomi, sclera anicteric, oral mucosa moist without thrush or lesions  NECK: supple, no palpable LAD or masses  HT: reg rate and rhythm, no murmurs, or gallops  LUNGS: clear to auscultation bilaterally  ABD: soft, nt, nd, +bs x 4, no HSM appreciated  EXT: no clubbing, cyanosis, or edema  NEURO: CN 2-12 grossly intact    Current Outpatient Prescriptions   Medication Sig Dispense Refill     albuterol (VENTOLIN HFA) 108 (90 BASE) MCG/ACT inhaler Inhale 2 puffs into the lungs 4 times daily as needed 1 Inhaler 11     calcium carbonate (OS-KAVYA 500 MG [place]. CA) 500 MG tablet Take by mouth 2 times daily       Glucosamine-Chondroitin (GLUCOSAMINE CHONDR COMPLEX PO) Take 1,000 mg by mouth       metroNIDAZOLE (METROCREAM) 0.75 % cream Apply topically 2 times daily as needed 45 g 11     mometasone (ASMANEX 30 METERED DOSES) 220 MCG/INH Inhaler Inhale 1 puff into the lungs daily 3 Inhaler 3     Multiple Vitamin (MULTI-VITAMIN PO) Take  by mouth. Occasionally         Omega-3 Fatty Acids (FISH OIL) 1200 MG CAPS        OMEGA-3 FATTY ACIDS-VITAMIN E PO Take 1,000 capsules by mouth       ranitidine (ZANTAC) 300 MG tablet Take 1 tablet (300 mg) by mouth At Bedtime 90 tablet 3     UNABLE TO FIND        LABS:   5/14/2018 15:49   Sodium 138   Potassium 3.9   Chloride 106   Carbon Dioxide 26   Urea Nitrogen 16   Creatinine 0.99   GFR Estimate 77   GFR Estimate If Black >90   Calcium 8.8   Anion Gap 6   Albumin 3.8   Protein Total 6.7 (L)   Bilirubin Total 0.4   Alkaline " Phosphatase 83   ALT 24   AST 16   TSH 3.23   Glucose 85   WBC 4.5   Hemoglobin 14.2   Hematocrit 40.8   Platelet Count 185   RBC Count 4.48   MCV 91   MCH 31.7   MCHC 34.8   RDW 12.5   Diff Method Automated Method   % Neutrophils 60.3   % Lymphocytes 24.5   % Monocytes 11.0   % Eosinophils 2.9   % Basophils 0.9   % Immature Granulocytes 0.4   Nucleated RBCs 0   Absolute Neutrophil 2.7   Absolute Lymphocytes 1.1   Absolute Monocytes 0.5   Absolute Eosinophils 0.1   Absolute Basophils 0.0   Abs Immature Granulocytes 0.0   Absolute Nucleated RBC 0.0       Assessment/Plan  R tonsil cancer P8H1qV2 - HPV+ :  No clinical evidence of recurrence at this time. His labs are stable with normal TSH. He is ~4 years out from treatment. Follow-up CT NCAP, labs in 6 months with follow-up with Dr. Romo.     Pulmonary nodules - Had minimal enlargement of one nodule in LLL. Will be followed on CT at next visit.     Reflux: Discussed rebound reflux is common on PPI. However Dr. Stewart from [place] [facility] had wanted to see him if he was still having issues. He will call for an appt. Could dose ranitidine 150 mg BID to see if this gives better symptom control.     Sis Franco PA-C    [facility]  [address] [place] [id number] [phone]

## 2018-07-17 ENCOUNTER — MYC MEDICAL ADVICE (OUTPATIENT)
Dept: FAMILY MEDICINE | Facility: CLINIC | Age: 58
End: 2018-07-17

## 2018-07-17 DIAGNOSIS — K21.9 GASTROESOPHAGEAL REFLUX DISEASE, ESOPHAGITIS PRESENCE NOT SPECIFIED: Primary | ICD-10-CM

## 2018-07-17 NOTE — TELEPHONE ENCOUNTER
Stop ranitidine  Start omeprazole 20 mg daily, try for at least 2-3 weeks to see if works  This is over the counter but sometimes it is cheaper to have a prescription ( and pay out of pocket for prescription ) so I did send this in.  If not better in a few weeks let us know.    Please inform patient.    Nicolás Morejon MD

## 2018-07-17 NOTE — TELEPHONE ENCOUNTER
[facility] message sent to patient with the information below.Edna Quesada RN [facility] Triage.

## 2018-07-17 NOTE — TELEPHONE ENCOUNTER
Inquiring on pharmacy information. Will then send to provider to advise.  Becca Laird RN  [facility]

## 2018-10-11 ASSESSMENT — ENCOUNTER SYMPTOMS
NERVOUS/ANXIOUS: 0
CONSTIPATION: 0
MYALGIAS: 0
HEMATURIA: 0
DYSURIA: 0
HEADACHES: 1
SHORTNESS OF BREATH: 0
PARESTHESIAS: 1
FEVER: 0
HEMATOCHEZIA: 0
JOINT SWELLING: 0
HEARTBURN: 1
FREQUENCY: 0
ABDOMINAL PAIN: 0
ARTHRALGIAS: 1
EYE PAIN: 0
SORE THROAT: 0
NAUSEA: 0
DIARRHEA: 0
PALPITATIONS: 0
WEAKNESS: 0
COUGH: 0

## 2018-10-12 ENCOUNTER — OFFICE VISIT (OUTPATIENT)
Dept: FAMILY MEDICINE | Facility: CLINIC | Age: 58
End: 2018-10-12
Payer: COMMERCIAL

## 2018-10-12 VITALS
WEIGHT: 154.38 LBS | OXYGEN SATURATION: 98 % | BODY MASS INDEX: 24.52 KG/M2 | DIASTOLIC BLOOD PRESSURE: 73 MMHG | TEMPERATURE: 97.4 F | SYSTOLIC BLOOD PRESSURE: 115 MMHG | HEART RATE: 77 BPM

## 2018-10-12 DIAGNOSIS — J45.20 INTERMITTENT ASTHMA, UNCOMPLICATED: ICD-10-CM

## 2018-10-12 DIAGNOSIS — K21.9 GASTROESOPHAGEAL REFLUX DISEASE, ESOPHAGITIS PRESENCE NOT SPECIFIED: ICD-10-CM

## 2018-10-12 DIAGNOSIS — Z13.1 SCREENING FOR DIABETES MELLITUS: ICD-10-CM

## 2018-10-12 DIAGNOSIS — Z00.00 ENCOUNTER FOR PREVENTATIVE ADULT HEALTH CARE EXAMINATION: Primary | ICD-10-CM

## 2018-10-12 DIAGNOSIS — Z13.6 CARDIOVASCULAR SCREENING; LDL GOAL LESS THAN 100: ICD-10-CM

## 2018-10-12 DIAGNOSIS — Z23 NEED FOR PROPHYLACTIC VACCINATION AND INOCULATION AGAINST INFLUENZA: ICD-10-CM

## 2018-10-12 DIAGNOSIS — Z23 NEED FOR TD VACCINE: ICD-10-CM

## 2018-10-12 DIAGNOSIS — Z91.09 ENVIRONMENTAL ALLERGIES: ICD-10-CM

## 2018-10-12 DIAGNOSIS — L71.9 ROSACEA: ICD-10-CM

## 2018-10-12 LAB
CHOLEST SERPL-MCNC: 217 MG/DL
HBA1C MFR BLD: 5.7 % (ref 0–5.6)
HDLC SERPL-MCNC: 57 MG/DL
LDLC SERPL CALC-MCNC: 148 MG/DL
NONHDLC SERPL-MCNC: 160 MG/DL
TRIGL SERPL-MCNC: 58 MG/DL

## 2018-10-12 PROCEDURE — 90472 IMMUNIZATION ADMIN EACH ADD: CPT | Performed by: FAMILY MEDICINE

## 2018-10-12 PROCEDURE — 90714 TD VACC NO PRESV 7 YRS+ IM: CPT | Performed by: FAMILY MEDICINE

## 2018-10-12 PROCEDURE — 99213 OFFICE O/P EST LOW 20 MIN: CPT | Mod: 25 | Performed by: FAMILY MEDICINE

## 2018-10-12 PROCEDURE — 80061 LIPID PANEL: CPT | Performed by: FAMILY MEDICINE

## 2018-10-12 PROCEDURE — 90686 IIV4 VACC NO PRSV 0.5 ML IM: CPT | Performed by: FAMILY MEDICINE

## 2018-10-12 PROCEDURE — 90471 IMMUNIZATION ADMIN: CPT | Performed by: FAMILY MEDICINE

## 2018-10-12 PROCEDURE — 99396 PREV VISIT EST AGE 40-64: CPT | Mod: 25 | Performed by: FAMILY MEDICINE

## 2018-10-12 PROCEDURE — 36415 COLL VENOUS BLD VENIPUNCTURE: CPT | Performed by: FAMILY MEDICINE

## 2018-10-12 PROCEDURE — 83036 HEMOGLOBIN GLYCOSYLATED A1C: CPT | Performed by: FAMILY MEDICINE

## 2018-10-12 RX ORDER — DOXYCYCLINE 100 MG/1
100 CAPSULE ORAL 2 TIMES DAILY
Qty: 60 CAPSULE | Refills: 2 | Status: SHIPPED | OUTPATIENT
Start: 2018-10-12 | End: 2018-11-11

## 2018-10-12 RX ORDER — LORATADINE 10 MG/1
10 TABLET ORAL DAILY
Qty: 30 TABLET | Refills: 11 | Status: SHIPPED | OUTPATIENT
Start: 2018-10-12 | End: 2019-10-28

## 2018-10-12 ASSESSMENT — ENCOUNTER SYMPTOMS
NAUSEA: 0
DIARRHEA: 0
COUGH: 0
JOINT SWELLING: 0
HEARTBURN: 1
WEAKNESS: 0
HEMATURIA: 0
FREQUENCY: 0
ABDOMINAL PAIN: 0
DYSURIA: 0
CONSTIPATION: 0
MYALGIAS: 0
EYE PAIN: 0
SORE THROAT: 0
SHORTNESS OF BREATH: 0
FEVER: 0
HEMATOCHEZIA: 0
NERVOUS/ANXIOUS: 0
ARTHRALGIAS: 1
HEADACHES: 1
PALPITATIONS: 0
PARESTHESIAS: 1

## 2018-10-12 ASSESSMENT — PAIN SCALES - GENERAL: PAINLEVEL: NO PAIN (0)

## 2018-10-12 NOTE — MR AVS SNAPSHOT
After Visit Summary   10/12/2018    Jose Wise    MRN: [id number]           Patient Information     Date Of Birth          1960        Visit Information        Provider Department      10/12/2018 1:00 PM Nicolás Morejon MD [facility]        Today's Diagnoses     Need for Td vaccine    -  1    Intermittent asthma, uncomplicated        Environmental allergies        Gastroesophageal reflux disease, esophagitis presence not specified        Rosacea        CARDIOVASCULAR SCREENING; LDL GOAL LESS THAN 100        Screening for diabetes mellitus          Care Instructions    Increase exercise    Stay on omeprazole    Use ranitidine as needed    tums as needed also    We will send you lab results    Start doxycycline 2x daily.  If improving, could back off to once daily and eventually taper off    Stay on metronidazole cream    If not better on doxycycline, call            Follow-ups after your visit        Your next 10 appointments already scheduled     Nov 13, 2018 12:40 PM CST   CT SOFT TISSUE NECK W/O & W CONTRAST with UCCT1   [facility] ([facility])    [address] [address] [phone]           How do I prepare for my exam? (Food and drink instructions) **You will have contrast for this exam.** Do not eat or drink for 2 hours before your exam. If you need to take medicine, you may take it with small sips of water. (We may ask you to take liquid medicine as well.)  The day before your exam, drink extra fluids at least six 8-ounce glasses (unless your doctor tells you to restrict your fluids).  How do I prepare for my exam? (Other instructions) Patients over 70 or patients with diabetes or kidney problems: If you haven t had a blood test (creatinine test) within the last 30 days, the Cardiologist/Radiologist may require you to get this test prior to your exam.  What should I wear: Please  wear loose clothing, such as a sweat suit or jogging clothes.  Avoid snaps, zippers and other metal. We may ask you to undress and put on a hospital gown.  How long does the exam take: Most scans take less than 20 minutes.  What should I bring: Please bring any scans or X-rays taken at other hospitals, if similar tests were done. Also bring a list of your medicines, including vitamins, minerals and over-the-counter drugs. It is safest to leave personal items at home.  Do I need a :  No  is needed.  What do I need to tell my doctor? Be sure to tell your doctor: * If you have any allergies. * If there s any chance you are pregnant. * If you are breastfeeding. * If you have diabetes as your medication may need to be adjusted for this exam.  What should I do after the exam: No restrictions, You may resume normal activities.  What is this test: A CT (computed tomography) scan is a series of pictures that allows us to look inside your body. The scanner creates images of the body in cross sections, much like slices of bread. This helps us see any problems more clearly. You may receive contrast (X-ray dye) before or during your scan. Contrast is given through an IV (small needle in your arm).  Who should I call with questions: If you have any questions, please call the Imaging Department where you will have your exam. Directions, parking instructions, and other information is available on our website, [email or web address].            Nov 13, 2018  1:00 PM CST   CT CHEST/ABDOMEN/PELVIS W CONTRAST with UCCT1   [facility] ([facility])    [address] [address] [phone]           How do I prepare for my exam? (Food and drink instructions) To prepare: Do not eat or drink for 2 hours before your exam. If you need to take medicine, you may take it with small sips of water. (We may ask you to take liquid medicine as well.)  How do I  prepare for my exam? (Other instructions) Please arrive 30 minutes early for your CT.  Once in the department you might be asked to drink water 15-20 minutes prior to your exam.  If indicated you may be asked to drink an oral contrast in advance of your CT.  If this is the case, the imaging team will let you know or be in contact with you prior to your appointment  Patients over 70 or patients with diabetes or kidney problems: If you haven t had a blood test (creatinine test) within the last 30 days, the Cardiologist/Radiologist may require you to get this test prior to your exam.  If you have diabetes:  Continue to take your metformin medication on the day of your exam  What should I wear: Please wear loose clothing, such as a sweat suit or jogging clothes. Avoid snaps, zippers and other metal. We may ask you to undress and put on a hospital gown.  How long does the exam take: Most scans take less than 20 minutes.  What should I bring: Please bring any scans or X-rays taken at other hospitals, if similar tests were done. Also bring a list of your medicines, including vitamins, minerals and over-the-counter drugs. It is safest to leave personal items at home.  Do I need a : No  is needed.  What do I need to tell my doctor? Be sure to tell your doctor: * If you have any allergies. * If there s any chance you are pregnant. * If you are breastfeeding.  What should I do after the exam: No restrictions, You may resume normal activities.  What is this test: A CT (computed tomography) scan is a series of pictures that allows us to look inside your body. The scanner creates images of the body in cross sections, much like slices of bread. This helps us see any problems more clearly. You may receive contrast (X-ray dye) before or during your scan. You will be asked to drink the contrast.  Who should I call with questions: If you have any questions, please call the Imaging Department where you will have your exam.  Directions, parking instructions, and other information is available on our website, [email or web address].            Nov 13, 2018  3:30 PM CST   [facility] Lab Draw with  [facility] LAB DRAW   [facility] ([facility])    [address]  [address]  [facility] [id number] [phone]            Nov 13, 2018  4:00 PM CST   (Arrive by 3:45 PM)   Return Visit with Janes Romo DO   [facility] ([facility])    [address]  [address]  [facility] [address] [phone]              Who to contact     If you have questions or need follow up information about today's clinic visit or your schedule please contact [facility] directly at [phone].  Normal or non-critical lab and imaging results will be communicated to you by MyChart, letter or phone within 4 business days after the clinic has received the results. If you do not hear from us within 7 days, please contact the clinic through [employer] or phone. If you have a critical or abnormal lab result, we will notify you by phone as soon as possible.  Submit refill requests through [employer] or call your pharmacy and they will forward the refill request to us. Please allow 3 business days for your refill to be completed.          Additional Information About Your Visit        MyChart Information     [employer] gives you secure access to your electronic health record. If you see a primary care provider, you can also send messages to your care team and make appointments. If you have questions, please call your primary care clinic.  If you do not have a primary care provider, please call [phone] and they will assist you.        Care EveryWhere ID     This is your Care EveryWhere ID. This could be used by other organizations to access your [facility] medical records  [id number]        Your Vitals Were     Pulse Temperature Pulse Oximetry BMI  (Body Mass Index)          77 97.4  F (36.3  C) (Oral) 98% 24.52 kg/m2         Blood Pressure from Last 3 Encounters:   10/12/18 115/73   05/14/18 122/80   11/14/17 127/83    Weight from Last 3 Encounters:   10/12/18 154 lb 6 oz (70 kg)   05/14/18 156 lb 14.4 oz (71.2 kg)   03/07/18 156 lb (70.8 kg)              We Performed the Following     Asthma Action Plan (AAP)     Hemoglobin A1c     Lipid panel reflex to direct LDL Fasting     TD (ADULT, 7+) PRESERVE FREE     VACCINE ADMINISTRATION, INITIAL          Today's Medication Changes          These changes are accurate as of 10/12/18  1:45 PM.  If you have any questions, ask your nurse or doctor.               Start taking these medicines.        Dose/Directions    doxycycline 100 MG capsule   Commonly known as:  VIBRAMYCIN   Used for:  Rosacea   Started by:  Nicolás Morejon MD        Dose:  100 mg   Take 1 capsule (100 mg) by mouth 2 times daily   Quantity:  60 capsule   Refills:  2       loratadine 10 MG tablet   Commonly known as:  CLARITIN   Used for:  Environmental allergies   Started by:  Nicolás Morejon MD        Dose:  10 mg   Take 1 tablet (10 mg) by mouth daily   Quantity:  30 tablet   Refills:  11            Where to get your medicines      These medications were sent to [id number] - [place] - [address] [place] [address] AT [address] [place] [address]     Phone:  [phone]     doxycycline 100 MG capsule    metroNIDAZOLE 0.75 % cream         These medications were sent to [facility] [address] [address]     Phone:  [phone]     mometasone 220 MCG/INH Inhaler    omeprazole 20 MG CR capsule         Some of these will need a paper prescription and others can be bought over the counter.  Ask your nurse if you have questions.     Bring a paper prescription for each of these medications     loratadine 10 MG tablet                 Primary Care Provider Office Phone # Fax #    Nicolás Morejon [phone]       [address] [facility] [address]        Equal Access to Services     ANUJA STEWART : Hadii aad ku hadevatamika Trentali, wateshada luqtammy, qaybta kachristenda kevin, opal saravia taylorkate gonzalez jessie duron. So [facility] [phone].    ATENCIÓN: Si habla español, tiene a william disposición servicios gratuitos de asistencia lingüística. Llame al [phone].    We comply with applicable federal civil rights laws and [place] laws. We do not discriminate on the basis of race, color, national origin, age, disability, sex, sexual orientation, or gender identity.            Thank you!     Thank you for choosing [facility]  for your care. Our goal is always to provide you with excellent care. Hearing back from our patients is one way we can continue to improve our services. Please take a few minutes to complete the written survey that you may receive in the mail after your visit with us. Thank you!             Your Updated Medication List - Protect others around you: Learn how to safely use, store and throw away your medicines at www.disposemymeds.org.          This list is accurate as of 10/12/18  1:45 PM.  Always use your most recent med list.                   Brand Name Dispense Instructions for use Diagnosis    albuterol 108 (90 Base) MCG/ACT inhaler    VENTOLIN HFA    1 Inhaler    Inhale 2 puffs into the lungs 4 times daily as needed    Intermittent asthma, uncomplicated       calcium carbonate 500 mg (elemental) 500 MG tablet    OS-KAVYA     Take by mouth 2 times daily        doxycycline 100 MG capsule    VIBRAMYCIN    60 capsule    Take 1 capsule (100 mg) by mouth 2 times daily    Rosacea       fish Oil 1200 MG capsule           GLUCOSAMINE CHONDR COMPLEX PO      Take 1,000 mg by mouth        loratadine 10 MG tablet    CLARITIN    30 tablet    Take 1 tablet (10 mg) by mouth daily     Environmental allergies       metroNIDAZOLE 0.75 % cream    METROCREAM    45 g    Apply topically 2 times daily as needed    Rosacea       mometasone 220 MCG/INH Inhaler    ASMANEX 30 METERED DOSES    3 Inhaler    Inhale 1 puff into the lungs daily    Intermittent asthma, uncomplicated       MULTI-VITAMIN PO      Take  by mouth. Occasionally        OMEGA-3 FATTY ACIDS-VITAMIN E PO      Take 1,000 capsules by mouth        omeprazole 20 MG CR capsule    priLOSEC    90 capsule    TAKE 1 CAPSULE BY MOUTH EVERY DAY    Gastroesophageal reflux disease, esophagitis presence not specified       UNABLE TO FIND

## 2018-10-12 NOTE — LETTER
My Asthma Action Plan  Name: Jose Wise   YOB: 1960  Date: 10/12/2018   My doctor: Nicolás Morejon MD   My clinic: [facility]        My Control Medicine: asmanex  My Rescue Medicine: Albuterol (Proair/Ventolin/Proventil) inhaler as needed    My Asthma Severity: intermittent  Avoid your asthma triggers: see list               GREEN ZONE   Good Control    I feel good    No cough or wheeze    Can work, sleep and play without asthma symptoms       Take your asthma control medicine every day.     1. If exercise triggers your asthma, take your rescue medication    15 minutes before exercise or sports, and    During exercise if you have asthma symptoms  2. Spacer to use with inhaler: If you have a spacer, make sure to use it with your inhaler             YELLOW ZONE Getting Worse  I have ANY of these:    I do not feel good    Cough or wheeze    Chest feels tight    Wake up at night   1. Keep taking your Green Zone medications  2. Start taking your rescue medicine:    every 20 minutes for up to 1 hour. Then every 4 hours for 24-48 hours.  3. If you stay in the Yellow Zone for more than 12-24 hours, contact your doctor.  4. If you do not return to the Green Zone in 12-24 hours or you get worse, start taking your oral steroid medicine if prescribed by your provider.           RED ZONE Medical Alert - Get Help  I have ANY of these:    I feel awful    Medicine is not helping    Breathing getting harder    Trouble walking or talking    Nose opens wide to breathe       1. Take your rescue medicine NOW  2. If your provider has prescribed an oral steroid medicine, start taking it NOW  3. Call your doctor NOW  4. If you are still in the Red Zone after 20 minutes and you have not reached your doctor:    Take your rescue medicine again and    Call 911 or go to the emergency room right away    See your regular doctor within 2 weeks of an Emergency Room or Urgent Care visit for follow-up  treatment.          Annual Reminders:  Meet with Asthma Educator,  Flu Shot in the Fall, consider Pneumonia Vaccination for patients with asthma (aged 19 and older).    Pharmacy:    [facility]  [id number] - [place], [place] - [address]  [facility] [place] - [address] [facility] - [place], [place] - [address]  [facility] - [place], [place] - [address] [id number] - [place] - [address]                      Asthma Triggers  How To Control Things That Make Your Asthma Worse    Triggers are things that make your asthma worse.  Look at the list below to help you find your triggers and what you can do about them.  You can help prevent asthma flare-ups by staying away from your triggers.      Trigger                                                          What you can do   Cigarette Smoke  Tobacco smoke can make asthma worse. Do not allow smoking in your home, car or around you.  Be sure no one smokes at a child s day care or school.  If you smoke, ask your health care provider for ways to help you quit.  Ask family members to quit too.  Ask your health care provider for a referral to Quit Plan to help you quit smoking, or call 2-474-582-PLAN.     Colds, Flu, Bronchitis  These are common triggers of asthma. Wash your hands often.  Don t touch your eyes, nose or mouth.  Get a flu shot every year.     Dust Mites  These are tiny bugs that live in cloth or carpet. They are too small to see. Wash sheets and blankets in hot water every week.   Encase pillows and mattress in dust mite proof covers.  Avoid having carpet if you can. If you have carpet, vacuum weekly.   Use a dust mask and HEPA vacuum.   Pollen and Outdoor Mold  Some people are allergic to trees, grass, or weed pollen, or molds. Try to keep your windows closed.  Limit time  out doors when pollen count is high.   Ask you health care provider about taking medicine during allergy season.     Animal Dander  Some people are allergic to skin flakes, urine or saliva from pets with fur or feathers. Keep pets with fur or feathers out of your home.    If you can t keep the pet outdoors, then keep the pet out of your bedroom.  Keep the bedroom door closed.  Keep pets off cloth furniture and away from stuffed toys.     Mice, Rats, and Cockroaches  Some people are allergic to the waste from these pests.   Cover food and garbage.  Clean up spills and food crumbs.  Store grease in the refrigerator.   Keep food out of the bedroom.   Indoor Mold  This can be a trigger if your home has high moisture. Fix leaking faucets, pipes, or other sources of water.   Clean moldy surfaces.  Dehumidify basement if it is damp and smelly.   Smoke, Strong Odors, and Sprays  These can reduce air quality. Stay away from strong odors and sprays, such as perfume, powder, hair spray, paints, smoke incense, paint, cleaning products, candles and new carpet.   Exercise or Sports  Some people with asthma have this trigger. Be active!  Ask your doctor about taking medicine before sports or exercise to prevent symptoms.    Warm up for 5-10 minutes before and after sports or exercise.     Other Triggers of Asthma  Cold air:  Cover your nose and mouth with a scarf.  Sometimes laughing or crying can be a trigger.  Some medicines and food can trigger asthma.

## 2018-10-12 NOTE — PROGRESS NOTES
SUBJECTIVE:   CC: Jose Wise is an 58 year old male who presents for preventative health visit.     Physical   Annual:     Getting at least 3 servings of Calcium per day:  Yes    Bi-annual eye exam:  Yes    Dental care twice a year:  Yes    Sleep apnea or symptoms of sleep apnea:  None    Diet:  Regular (no restrictions)    Frequency of exercise:  None    Taking medications regularly:  Yes    Medication side effects:  None    Additional concerns today:  YES        Rosacea is acting up    Whole face itches some days    Ears some too    Using the metronidzole cream once daily    Never been on oral med for rosacea    Heartburn well controlled on ppi    Tried to go back to ranitidine    Didn't work as well    No exercise outside of work      Today's PHQ-2 Score:   PHQ-2 ( 1999 Pfizer) 10/11/2018   Q1: Little interest or pleasure in doing things 0   Q2: Feeling down, depressed or hopeless 0   PHQ-2 Score 0   Q1: Little interest or pleasure in doing things Not at all   Q2: Feeling down, depressed or hopeless Not at all   PHQ-2 Score 0       Abuse: Current or Past(Physical, Sexual or Emotional)- No  Do you feel safe in your environment - Yes    Social History   Substance Use Topics     Smoking status: Former Smoker     Packs/day: 0.50     Years: 25.00     Types: Cigarettes     Start date: 6/6/1980     Quit date: 1/9/2006     Smokeless tobacco: Never Used      Comment: 1 pack a day     Alcohol use No      Comment: 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 Not Applicable   No flowsheet data found.    Last PSA:   PSA   Date Value Ref Range Status   10/16/2017 0.55 0 - 4 ug/L Final     Comment:     Assay Method:  Chemiluminescence using Siemens Vista analyzer       Reviewed orders with patient. Reviewed health maintenance and updated orders accordingly - Yes       Reviewed and updated  as needed this visit by clinical staff  Tobacco  Meds  Med Hx  Surg Hx  Fam Hx  Soc Hx        Reviewed and updated as needed this visit by Provider            Review of Systems   Constitutional: Negative for fever.   HENT: Positive for hearing loss. Negative for congestion, ear pain and sore throat.    Eyes: Negative for pain and visual disturbance.   Respiratory: Negative for cough and shortness of breath.    Cardiovascular: Negative for chest pain, palpitations and peripheral edema.   Gastrointestinal: Positive for heartburn. Negative for abdominal pain, constipation, diarrhea, hematochezia and nausea.   Genitourinary: Negative for discharge, dysuria, frequency, genital sores, hematuria, impotence and urgency.   Musculoskeletal: Positive for arthralgias. Negative for joint swelling and myalgias.   Skin: Negative for rash.   Neurological: Positive for headaches and paresthesias. Negative for weakness.   Psychiatric/Behavioral: Negative for mood changes. The patient is not nervous/anxious.           Headache starts upper back, dull, goes to head    Fingers numb sometimes        OBJECTIVE:   /73 (BP Location: Right arm, Patient Position: Chair, Cuff Size: Adult Regular)  Pulse 77  Temp 97.4  F (36.3  C) (Oral)  Wt 154 lb 6 oz (70 kg)  SpO2 98%  BMI 24.52 kg/m2    Physical Exam  GENERAL: healthy, alert and no distress  EYES: Eyes grossly normal to inspection, PERRL and conjunctivae and sclerae normal  HENT: ear canals and TM's normal, nose and mouth without ulcers or lesions  NECK: no adenopathy, no asymmetry, masses, or scars and thyroid normal to palpation  RESP: lungs clear to auscultation - no rales, rhonchi or wheezes  CV: regular rate and rhythm, normal S1 S2, no S3 or S4, no murmur, click or rub, no peripheral edema and peripheral pulses strong  ABDOMEN: soft, nontender, no hepatosplenomegaly, no masses and bowel sounds normal   (male): normal male genitalia without lesions or urethral  discharge, no hernia  RECTAL: normal sphincter tone, no rectal masses, prostate normal size, smooth, nontender without nodules or masses  MS: no gross musculoskeletal defects noted, no edema  SKIN: some dryness of skin in general especially hands.  Patient has some general redness around the face .  No tenderness.  NEURO: Normal strength and tone, mentation intact and speech normal  PSYCH: mentation appears normal, affect normal/bright    Diagnostic Test Results:  none     ASSESSMENT/PLAN:   Jose was seen today for physical and health maintenance.    Diagnoses and all orders for this visit:    Encounter for preventative adult health care examination    Need for Td vaccine  -     VACCINE ADMINISTRATION, INITIAL  -     TD (ADULT, 7+) PRESERVE FREE    Intermittent asthma, uncomplicated  -     mometasone (ASMANEX 30 METERED DOSES) 220 MCG/INH Inhaler; Inhale 1 puff into the lungs daily    Environmental allergies  -     loratadine (CLARITIN) 10 MG tablet; Take 1 tablet (10 mg) by mouth daily    Gastroesophageal reflux disease, esophagitis presence not specified  -     omeprazole (PRILOSEC) 20 MG CR capsule; TAKE 1 CAPSULE BY MOUTH EVERY DAY    Rosacea  -     doxycycline (VIBRAMYCIN) 100 MG capsule; Take 1 capsule (100 mg) by mouth 2 times daily  -     metroNIDAZOLE (METROCREAM) 0.75 % cream; Apply topically 2 times daily as needed    CARDIOVASCULAR SCREENING; LDL GOAL LESS THAN 100  -     Lipid panel reflex to direct LDL Fasting    Screening for diabetes mellitus  -     Hemoglobin A1c    Need for prophylactic vaccination and inoculation against influenza  -     FLU VACCINE, SPLIT VIRUS, IM (QUADRIVALENT) [56478]- >3 YRS  -     Vaccine Administration, Each Additional [80726]    Other orders  -     Cancel: HIV Antigen Antibody Combo      Discussed multiple issues with patient  Up to date on colonoscopy  For gerd, since symptoms worsened when he got back on h2 blocker, just stay on ppi daily and use h2 for breakthrough  "symptoms  Flu shot given  With worsening rosacea, use doxycycline.  If not any better in 1-2 months, let us know and we can refer to dermatology  If better, can taper down and possibly off the doxycyline   Stay on the metronidazole cream  Td given  Keep working on healthy diet/exercise       COUNSELING:   Reviewed preventive health counseling, as reflected in patient instructions  Healthy diet  Sees dentist regularly   Also eye doctor      BP Readings from Last 1 Encounters:   10/12/18 115/73     Estimated body mass index is 24.52 kg/(m^2) as calculated from the following:    Height as of 5/14/18: 5' 6.53\" (1.69 m).    Weight as of this encounter: 154 lb 6 oz (70 kg).           reports that he quit smoking about 12 years ago. His smoking use included Cigarettes. He started smoking about 38 years ago. He has a 12.50 pack-year smoking history. He has never used smokeless tobacco.      Counseling Resources:  ATP IV Guidelines  Pooled Cohorts Equation Calculator  FRAX Risk Assessment  ICSI Preventive Guidelines  Dietary Guidelines for Americans, 2010  USDA's MyPlate  ASA Prophylaxis  Lung CA Screening    Nicolás Morejon MD  [facility]  Answers for HPI/ROS submitted by the patient on 10/11/2018   PHQ-2 Score: 0    "

## 2018-10-12 NOTE — PROGRESS NOTES

## 2018-10-12 NOTE — PATIENT INSTRUCTIONS
Increase exercise    Stay on omeprazole    Use ranitidine as needed    tums as needed also    We will send you lab results    Start doxycycline 2x daily.  If improving, could back off to once daily and eventually taper off    Stay on metronidazole cream    If not better on doxycycline, call

## 2018-10-13 ASSESSMENT — ASTHMA QUESTIONNAIRES: ACT_TOTALSCORE: 25

## 2018-10-16 NOTE — PROGRESS NOTES
"The cholesterol is higher than last time.      The hemoglobin a1c is about the same as it has been in past years but with the new normal range, it is barely into the \"borderline diabetes\" range.    Just keep working on healthy diet/exercise.    Nicolás Morejon MD  "

## 2018-11-07 ENCOUNTER — TELEPHONE (OUTPATIENT)
Dept: OTOLARYNGOLOGY | Facility: CLINIC | Age: 58
End: 2018-11-07

## 2018-11-13 ENCOUNTER — RADIANT APPOINTMENT (OUTPATIENT)
Dept: CT IMAGING | Facility: CLINIC | Age: 58
End: 2018-11-13
Attending: PHYSICIAN ASSISTANT
Payer: COMMERCIAL

## 2018-11-13 ENCOUNTER — ONCOLOGY VISIT (OUTPATIENT)
Dept: ONCOLOGY | Facility: CLINIC | Age: 58
End: 2018-11-13
Attending: INTERNAL MEDICINE
Payer: COMMERCIAL

## 2018-11-13 ENCOUNTER — APPOINTMENT (OUTPATIENT)
Dept: LAB | Facility: CLINIC | Age: 58
End: 2018-11-13
Attending: INTERNAL MEDICINE
Payer: COMMERCIAL

## 2018-11-13 VITALS
OXYGEN SATURATION: 100 % | HEIGHT: 67 IN | TEMPERATURE: 97.3 F | SYSTOLIC BLOOD PRESSURE: 132 MMHG | DIASTOLIC BLOOD PRESSURE: 84 MMHG | HEART RATE: 72 BPM | BODY MASS INDEX: 24.48 KG/M2 | WEIGHT: 156 LBS | RESPIRATION RATE: 16 BRPM

## 2018-11-13 DIAGNOSIS — C09.0 MALIGNANT NEOPLASM OF TONSILLAR FOSSA (H): ICD-10-CM

## 2018-11-13 LAB
ALBUMIN SERPL-MCNC: 3.7 G/DL (ref 3.4–5)
ALP SERPL-CCNC: 96 U/L (ref 40–150)
ALT SERPL W P-5'-P-CCNC: 29 U/L (ref 0–70)
ANION GAP SERPL CALCULATED.3IONS-SCNC: 5 MMOL/L (ref 3–14)
AST SERPL W P-5'-P-CCNC: 28 U/L (ref 0–45)
BASOPHILS # BLD AUTO: 0 10E9/L (ref 0–0.2)
BASOPHILS NFR BLD AUTO: 0.7 %
BILIRUB SERPL-MCNC: 0.6 MG/DL (ref 0.2–1.3)
BUN SERPL-MCNC: 18 MG/DL (ref 7–30)
CALCIUM SERPL-MCNC: 8.4 MG/DL (ref 8.5–10.1)
CHLORIDE SERPL-SCNC: 104 MMOL/L (ref 94–109)
CO2 SERPL-SCNC: 27 MMOL/L (ref 20–32)
CREAT SERPL-MCNC: 1.04 MG/DL (ref 0.66–1.25)
DIFFERENTIAL METHOD BLD: ABNORMAL
EOSINOPHIL # BLD AUTO: 0.1 10E9/L (ref 0–0.7)
EOSINOPHIL NFR BLD AUTO: 2.3 %
ERYTHROCYTE [DISTWIDTH] IN BLOOD BY AUTOMATED COUNT: 12.7 % (ref 10–15)
GFR SERPL CREATININE-BSD FRML MDRD: 73 ML/MIN/1.7M2
GLUCOSE SERPL-MCNC: 81 MG/DL (ref 70–99)
HCT VFR BLD AUTO: 40 % (ref 40–53)
HGB BLD-MCNC: 13.4 G/DL (ref 13.3–17.7)
IMM GRANULOCYTES # BLD: 0 10E9/L (ref 0–0.4)
IMM GRANULOCYTES NFR BLD: 0.5 %
LYMPHOCYTES # BLD AUTO: 1.1 10E9/L (ref 0.8–5.3)
LYMPHOCYTES NFR BLD AUTO: 26.3 %
MCH RBC QN AUTO: 31.2 PG (ref 26.5–33)
MCHC RBC AUTO-ENTMCNC: 33.5 G/DL (ref 31.5–36.5)
MCV RBC AUTO: 93 FL (ref 78–100)
MONOCYTES # BLD AUTO: 0.4 10E9/L (ref 0–1.3)
MONOCYTES NFR BLD AUTO: 9.5 %
NEUTROPHILS # BLD AUTO: 2.6 10E9/L (ref 1.6–8.3)
NEUTROPHILS NFR BLD AUTO: 60.7 %
NRBC # BLD AUTO: 0 10*3/UL
NRBC BLD AUTO-RTO: 0 /100
PLATELET # BLD AUTO: 174 10E9/L (ref 150–450)
POTASSIUM SERPL-SCNC: 4.3 MMOL/L (ref 3.4–5.3)
PROT SERPL-MCNC: 6.6 G/DL (ref 6.8–8.8)
RBC # BLD AUTO: 4.3 10E12/L (ref 4.4–5.9)
SODIUM SERPL-SCNC: 136 MMOL/L (ref 133–144)
TSH SERPL DL<=0.005 MIU/L-ACNC: 3.3 MU/L (ref 0.4–4)
WBC # BLD AUTO: 4.3 10E9/L (ref 4–11)

## 2018-11-13 PROCEDURE — 85025 COMPLETE CBC W/AUTO DIFF WBC: CPT | Performed by: INTERNAL MEDICINE

## 2018-11-13 PROCEDURE — 36415 COLL VENOUS BLD VENIPUNCTURE: CPT

## 2018-11-13 PROCEDURE — 80053 COMPREHEN METABOLIC PANEL: CPT | Performed by: INTERNAL MEDICINE

## 2018-11-13 PROCEDURE — 84443 ASSAY THYROID STIM HORMONE: CPT | Performed by: INTERNAL MEDICINE

## 2018-11-13 PROCEDURE — 99214 OFFICE O/P EST MOD 30 MIN: CPT | Mod: ZP | Performed by: INTERNAL MEDICINE

## 2018-11-13 PROCEDURE — G0463 HOSPITAL OUTPT CLINIC VISIT: HCPCS | Mod: ZF

## 2018-11-13 RX ORDER — DOXYCYCLINE 100 MG/1
CAPSULE ORAL
Refills: 2 | COMMUNITY
Start: 2018-10-31 | End: 2019-10-28

## 2018-11-13 RX ORDER — IOPAMIDOL 755 MG/ML
95 INJECTION, SOLUTION INTRAVASCULAR ONCE
Status: COMPLETED | OUTPATIENT
Start: 2018-11-13 | End: 2018-11-13

## 2018-11-13 RX ADMIN — IOPAMIDOL 95 ML: 755 INJECTION, SOLUTION INTRAVASCULAR at 12:46

## 2018-11-13 ASSESSMENT — PAIN SCALES - GENERAL: PAINLEVEL: NO PAIN (0)

## 2018-11-13 NOTE — DISCHARGE INSTRUCTIONS

## 2018-11-13 NOTE — NURSING NOTE
"Oncology Rooming Note    November 13, 2018 3:39 PM   Jose Wise is a 58 year old male who presents for:    Chief Complaint   Patient presents with     Blood Draw     vital signs and venipuncture done by [facility]     Oncology Clinic Visit     Return visit related to [facility]     Initial Vitals: /84 (BP Location: Right arm, Patient Position: Sitting, Cuff Size: Adult Regular)  Pulse 72  Temp 97.3  F (36.3  C) (Oral)  Resp 16  Ht 1.69 m (5' 6.53\")  Wt 70.8 kg (156 lb)  SpO2 100%  BMI 24.78 kg/m2 Estimated body mass index is 24.78 kg/(m^2) as calculated from the following:    Height as of this encounter: 1.69 m (5' 6.53\").    Weight as of this encounter: 70.8 kg (156 lb). Body surface area is 1.82 meters squared.  No Pain (0) Comment: Data Unavailable   No LMP for male patient.  Allergies reviewed: Yes  Medications reviewed: Yes    Medications: Medication refills not needed today.  Pharmacy name entered into EPIC:    [facility] - [facility] ORDER [facility]  [id number] - [place] - [address] [facility] [facility]  [facility] [place] [address] [facility] [place] [address] [facility] [place] - [place], [place] - [address] [id number] - [place] - [address] [place] [address] AT [address]    Clinical concerns: No new concerns. Provider was notified.    10 minutes for nursing intake (face to face time)     Cristal Villa LPN            "

## 2018-11-13 NOTE — NURSING NOTE
Chief Complaint   Patient presents with     Blood Draw     vital signs and venipuncture done by INDU Cortes CMA on 11/13/2018 at 1:21 PM

## 2018-11-13 NOTE — MR AVS SNAPSHOT
After Visit Summary   11/13/2018    Jose Wise    MRN: [id number]           Patient Information     Date Of Birth          1960        Visit Information        Provider Department      11/13/2018 4:00 PM Janes Romo DO [facility]        Today's Diagnoses     MALIG NEOPLASM TONSILLAR FOSSA           Follow-ups after your visit        Your next 10 appointments already scheduled     Dec 12, 2018  3:00 PM CST   (Arrive by 2:45 PM)   Return Visit with Yazmin Bundy MD   [facility] ([facility] [place])    [address]  4th [address]  [facility] [address] [phone]            May 28, 2019  2:20 PM CDT   CT SOFT TISSUE NECK W CONTRAST with UCCT2   [facility] CT ([facility])    [address]  1st [address]  [facility] [id number] [phone]           How do I prepare for my exam? (Food and drink instructions) **You will have contrast for this exam.** Do not eat or drink for 2 hours before your exam. If you need to take medicine, you may take it with small sips of water. (We may ask you to take liquid medicine as well.)  The day before your exam, drink extra fluids at least six 8-ounce glasses (unless your doctor tells you to restrict your fluids).  How do I prepare for my exam? (Other instructions) Patients over 70 or patients with diabetes or kidney problems: If you haven t had a blood test (creatinine test) within the last 30 days, the Cardiologist/Radiologist may require you to get this test prior to your exam.  What should I wear: Please wear loose clothing, such as a sweat suit or jogging clothes.  Avoid snaps, zippers and other metal. We may ask you to undress and put on a hospital gown.  How long does the exam take: Most scans take less than 20 minutes.  What should I bring: Please bring any scans or X-rays taken at other hospitals, if similar tests were done.  Also bring a list of your medicines, including vitamins, minerals and over-the-counter drugs. It is safest to leave personal items at home.  Do I need a :  No  is needed.  What do I need to tell my doctor? Be sure to tell your doctor: * If you have any allergies. * If there s any chance you are pregnant. * If you are breastfeeding. * If you have diabetes as your medication may need to be adjusted for this exam.  What should I do after the exam: No restrictions, You may resume normal activities.  What is this test: A CT (computed tomography) scan is a series of pictures that allows us to look inside your body. The scanner creates images of the body in cross sections, much like slices of bread. This helps us see any problems more clearly. You may receive contrast (X-ray dye) before or during your scan. Contrast is given through an IV (small needle in your arm).  Who should I call with questions: If you have any questions, please call the Imaging Department where you will have your exam. Directions, parking instructions, and other information is available on our website, [email or web address].            May 28, 2019  2:40 PM CDT   CT CHEST W CONTRAST with UCCT2   [facility] [place] [facility] ([facility])    [address] [address] [phone]           How do I prepare for my exam? (Food and drink instructions) **You will have contrast for this exam.** Do not eat or drink for 2 hours before your exam. If you need to take medicine, you may take it with small sips of water. (We may ask you to take liquid medicine as well.)  The day before your exam, drink extra fluids at least six 8-ounce glasses (unless your doctor tells you to restrict your fluids).  How do I prepare for my exam? (Other instructions) Patients over 70 or patients with diabetes or kidney problems: If you haven t had a blood test (creatinine test) within the last 30 days, the  Cardiologist/Radiologist may require you to get this test prior to your exam.  What should I wear: Please wear loose clothing, such as a sweat suit or jogging clothes.  Avoid snaps, zippers and other metal. We may ask you to undress and put on a hospital gown.  How long does the exam take: Most scans take less than 20 minutes.  What should I bring: Please bring any scans or X-rays taken at other hospitals, if similar tests were done. Also bring a list of your medicines, including vitamins, minerals and over-the-counter drugs. It is safest to leave personal items at home.  Do I need a :  No  is needed.  What do I need to tell my doctor? Be sure to tell your doctor: * If you have any allergies. * If there s any chance you are pregnant. * If you are breastfeeding. * If you have diabetes as your medication may need to be adjusted for this exam.  What should I do after the exam: No restrictions, You may resume normal activities.  What is this test: A CT (computed tomography) scan is a series of pictures that allows us to look inside your body. The scanner creates images of the body in cross sections, much like slices of bread. This helps us see any problems more clearly. You may receive contrast (X-ray dye) before or during your scan. Contrast is given through an IV (small needle in your arm).  Who should I call with questions: If you have any questions, please call the Imaging Department where you will have your exam. Directions, parking instructions, and other information is available on our website, [email or web address].            May 28, 2019  3:00 PM CDT   Lab with  LAB   [facility])    [address] [place] [address] [phone]            May 28, 2019  5:45 PM CDT   (Arrive by 5:30 PM)   Return Visit with Janes Romo DO   [facility] ([facility] [place])    [address] [place] [address]  [address]  [facility] [address] [phone]              Who to contact     If you have questions or need follow up information about today's clinic visit or your schedule please contact [facility] directly at [phone].  Normal or non-critical lab and imaging results will be communicated to you by MyChart, letter or phone within 4 business days after the clinic has received the results. If you do not hear from us within 7 days, please contact the clinic through [employer] or phone. If you have a critical or abnormal lab result, we will notify you by phone as soon as possible.  Submit refill requests through [employer] or call your pharmacy and they will forward the refill request to us. Please allow 3 business days for your refill to be completed.          Additional Information About Your Visit        [employer] Information     [employer] gives you secure access to your electronic health record. If you see a primary care provider, you can also send messages to your care team and make appointments. If you have questions, please call your primary care clinic.  If you do not have a primary care provider, please call [phone] and they will assist you.        Care EveryWhere ID     This is your Care EveryWhere ID. This could be used by other organizations to access your [place] medical records  [id number]        Your Vitals Were     Pulse Temperature Respirations Height Pulse Oximetry BMI (Body Mass Index)    72 97.3  F (36.3  C) (Oral) 16 1.69 m (5' 6.53\") 100% 24.78 kg/m2       Blood Pressure from Last 3 Encounters:   11/13/18 132/84   10/12/18 115/73   05/14/18 122/80    Weight from Last 3 Encounters:   11/13/18 70.8 kg (156 lb)   10/12/18 70 kg (154 lb 6 oz)   05/14/18 71.2 kg (156 lb 14.4 oz)              We Performed the Following     CBC with platelets differential     Comprehensive metabolic panel     TSH with free T4 reflex        Primary Care Provider Office Phone # Fax #    Nicolás D " MD Darleen [phone]       [address] [facility] [address]        Equal Access to Services     ANUJA STEWART : Hadii aad ku hadevatamika Bryan, wateshamagdaleno ramirez, deborahtiera chandlermamagdaleno vazquezdongmagdaleno, opal michellein hayaadolores vazquezkate gonzalez laSheilaget duron. So [facility] [phone].    ATENCIÓN: Si habla español, tiene a william disposición servicios gratuitos de asistencia lingüística. Llame al [phone].    We comply with applicable federal civil rights laws and [place] laws. We do not discriminate on the basis of race, color, national origin, age, disability, sex, sexual orientation, or gender identity.            Thank you!     Thank you for choosing [facility]  for your care. Our goal is always to provide you with excellent care. Hearing back from our patients is one way we can continue to improve our services. Please take a few minutes to complete the written survey that you may receive in the mail after your visit with us. Thank you!             Your Updated Medication List - Protect others around you: Learn how to safely use, store and throw away your medicines at www.disposemymeds.org.          This list is accurate as of 11/13/18 11:59 PM.  Always use your most recent med list.                   Brand Name Dispense Instructions for use Diagnosis    albuterol 108 (90 Base) MCG/ACT inhaler    VENTOLIN HFA    1 Inhaler    Inhale 2 puffs into the lungs 4 times daily as needed    Intermittent asthma, uncomplicated       calcium carbonate 500 mg (elemental) 500 MG tablet    OS-KAVYA     Take by mouth 2 times daily        doxycycline 100 MG capsule    VIBRAMYCIN     TAKE 1 CAPSULE BY MOUTH TWICE A DAY    Malignant neoplasm of tonsillar fossa (H)       fish Oil 1200 MG capsule           GLUCOSAMINE CHONDR COMPLEX PO      Take 1,000 mg by mouth        loratadine 10 MG tablet    CLARITIN    30 tablet    Take 1 tablet (10 mg) by mouth daily    Environmental allergies       metroNIDAZOLE 0.75 % cream     METROCREAM    45 g    Apply topically 2 times daily as needed    Rosacea       mometasone 220 MCG/INH Inhaler    ASMANEX 30 METERED DOSES    3 Inhaler    Inhale 1 puff into the lungs daily    Intermittent asthma, uncomplicated       MULTI-VITAMIN PO      Take  by mouth. Occasionally        OMEGA-3 FATTY ACIDS-VITAMIN E PO      Take 1,000 capsules by mouth        omeprazole 20 MG CR capsule    priLOSEC    90 capsule    TAKE 1 CAPSULE BY MOUTH EVERY DAY    Gastroesophageal reflux disease, esophagitis presence not specified       ranitidine 300 MG tablet    ZANTAC      Malignant neoplasm of tonsillar fossa (H)       UNABLE TO FIND

## 2018-11-13 NOTE — PROGRESS NOTES
REASON FOR VISIT:    CANCER STAGE: MALIG NEOPLASM TONSILLAR FOSSA    Staging form: Pharynx - Oropharynx, AJCC 7th Edition    - Clinical: Stage KWADWO (T2, N2b, M0) - Signed by Janes Romo DO on 11/8/2016      HISTORY OF PRESENT ILLNESS:  - 10/2013 lump in his R neck.  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 There was no LN element so it could be a soft tissue mass.  - 1/9/14 He was referred to Dr. Jayashree Alvarez at [facility] who perfromed a laryngoscopy on  revealed enlarged R tonsil.    - 1/15/14 Direct laryngoscopy and a biopsy of the R tonsil was identified squamous cell carcinoma.  P16+.     - 1/9/14 PET/CT showed a hypermetabolic tonsillar mass and 2 hypermetabolic LNs on the R side in level 2, measured 2.1cm.  No evidence of distant mets, but multiple tiny pulmonary nodules were seen, felt unlikely to be mets.     -1/17/14 he consulted radiation oncology and Dr. Romo in medical oncology.     -1/27/14 started on chemorads with HD cisplatin. He developed ototoxicity and was switched to carboplatin (AUC 6) for day 22.  He did not receive carboplatin day 43 due to thrombocytopenia.    Radiation therapy completed on 3/14/14 (7000cGy).    - His PET/CT after treatment showed a complete response  Jose returns to clinic today. He is 4.5 years out from completion of therapy. He is doing very well. He continues to have some dry mouth and some hearing loss, but otherwise is doing well. He was recently diagnosed with rosacea and has started taking doxycycline and metronidazole cream. He did not tolerate the metronidzole so is just using doxy. He otherwise feels weel. He has no other chronic concerns from his prior cancer treatment.  He has no trismus. He is seeing a dentist regularly and has no difficulties there.  He is otherwise without complaints.     Review Of Systems  10-point review of systems were negative except as noted in HPI.        EXAM:  Blood pressure 132/84, pulse 72, temperature  "97.3  F (36.3  C), temperature source Oral, resp. rate 16, height 1.69 m (5' 6.53\"), weight 70.8 kg (156 lb), SpO2 100 %.  GEN: alert and oriented x 3, nad  HEENT: perrla, eomi, sclera anicteric, oral mucosa moist without thrush  NECK: supple, no palpable LAD  HT: reg rate and rhythm, no murmurs  LUNGS: clear to auscultation bilaterally  ABD: soft, nt, nd, +bs x 4  EXT: no clubbing, cyanosis, or edema  NEURO: CN 2-12 intact, MS 5/5 b/l    Current Outpatient Prescriptions   Medication Sig Dispense Refill     albuterol (VENTOLIN HFA) 108 (90 BASE) MCG/ACT inhaler Inhale 2 puffs into the lungs 4 times daily as needed 1 Inhaler 11     calcium carbonate (OS-KAVYA 500 MG [place]. [place]) 500 MG tablet Take by mouth 2 times daily       doxycycline (VIBRAMYCIN) 100 MG capsule TAKE 1 CAPSULE BY MOUTH TWICE A DAY  2     Glucosamine-Chondroitin (GLUCOSAMINE CHONDR COMPLEX PO) Take 1,000 mg by mouth       loratadine (CLARITIN) 10 MG tablet Take 1 tablet (10 mg) by mouth daily 30 tablet 11     metroNIDAZOLE (METROCREAM) 0.75 % cream Apply topically 2 times daily as needed 45 g 11     mometasone (ASMANEX 30 METERED DOSES) 220 MCG/INH Inhaler Inhale 1 puff into the lungs daily 3 Inhaler 3     Multiple Vitamin (MULTI-VITAMIN PO) Take  by mouth. Occasionally         Omega-3 Fatty Acids (FISH OIL) 1200 MG CAPS        OMEGA-3 FATTY ACIDS-VITAMIN E PO Take 1,000 capsules by mouth       omeprazole (PRILOSEC) 20 MG CR capsule TAKE 1 CAPSULE BY MOUTH EVERY DAY 90 capsule 3     ranitidine (ZANTAC) 300 MG tablet        UNABLE TO FIND              Recent Labs   Lab Test  11/13/18   1318   WBC  4.3   HGB  13.4   PLT  174     @labrcent[na,potassium,chloride,co2,bun,cr@  Recent Labs   Lab Test  11/13/18   1318   PROTTOTAL  6.6*   ALBUMIN  3.7   BILITOTAL  0.6   AST  28   ALT  29   ALKPHOS  96         Recent Results (from the past 744 hour(s))   CT Soft Tissue Neck w Contrast    Narrative    CT SOFT TISSUE NECK W CONTRAST 11/13/2018 1:02 PM    History:  " surveillance scan locally advanced H&N cancer;  Malignant neoplasm of tonsillar fossa (H)  ICD-10: Malignant neoplasm of tonsillar fossa (H)      Comparison:  CT neck 11/14/2017: 11/7/2016, 9/14/2015, PET/CT 1/9/2014      Technique: Following intravenous administration of nonionic iodinated  contrast medium, thin section helical CT images were obtained from the  skull base down to the level of the aortic arch.  Axial, coronal and  sagittal reformations were performed with 2-3 mm slice thickness  reconstruction. Images were reviewed in soft tissue, lung and bone  windows.    Contrast: ISOVUE 370 95cc    Findings:   Evaluation of the mucosal space demonstrates no evident abnormality  the nasopharynx, oropharynx, hypopharynx or the glottis. There is no  evidence of residual tumor. The tongue base appears normal. Atrophy of  the right submandibular and right parotid gland, similar to prior,  likely related to radiation change. The salivary glands are otherwise  unremarkable in appearance. The thyroid gland appears normal.    There is no evident cervical lymphadenopathy. The fascial spaces in  the neck are intact bilaterally. The major vascular structures in the  neck appear unremarkable.    Evaluation of the osseous structures demonstrates no worrisome lytic  or sclerotic lesions. There is disc space narrowing at C5-C6, with  disc osteophyte complex resulting mild-to-moderate spinal canal  stenosis and mild bilateral neural foraminal narrowing.    The visualized lung apices are clear.      Impression    Impression:  1. In this patient with a history of squamous cell carcinoma status  post chemotherapy and radiation, there is no evidence of local tumor  recurrence.  2. No cervical lymphadenopathy.  3. Stable degenerative changes at C5-6, with mild-to-moderate spinal  canal stenosis and mild bilateral neural foraminal narrowing.    I have personally reviewed the examination and initial interpretation  and I agree with the  findings.    SCOTT FATIMA MD   CT Chest/Abdomen/Pelvis w Contrast    Narrative    EXAMINATION: CT CHEST/ABDOMEN/PELVIS W CONTRAST, 11/13/2018 1:02 PM    TECHNIQUE:  Helical CT images from the thoracic inlet through the  symphysis pubis were obtained  with contrast. Contrast dose: ISOVUE  370 95cc    COMPARISON: 11/14/2017; PET/CT 5/30/2014.    HISTORY: surveillance scan locally advanced H&N tonsillar cancer  with pulmonary nodules; Malignant neoplasm of tonsillar fossa (H)    FINDINGS:    LUNGS: Mild bilateral lower lobes dependent atelectasis. No acute  airspace opacities. Subcentimeter pulmonary nodules measuring up to 4  mm in the right lower lobe and 4 mm in the left lower lobe, stable. No  new or enlarging pulmonary nodules.    Chest: Unremarkable thyroid gland. Central tracheobronchial tree is  patent. Thoracic aorta and main pulmonary artery with normal diameter.  No central pulmonary embolism. Cardiac size within normal limits. No  pericardial effusions. No pleural effusions. Minimal fluid in the  superior aortic recess. No pneumothorax. Subcentimeter mediastinal and  right hilar lymph nodes, not enlarged by size criteria, stable. No  thoracic lymphadenopathy.    Abdomen and pelvis: Focal fat deposition along the falciform ligament.  No suspicious liver lesions. Patent hepatic vasculature. No biliary  tree dilation. Unremarkable gallbladder. Homogeneous pancreatic  parenchyma. Main pancreatic duct is not dilated. The spleen is not  enlarged. No focal splenic lesions.    Unremarkable adrenal glands. No renal stones or hydronephrosis.  Unremarkable urinary bladder. The prostate is not enlarged. Prostate  calcifications. Pelvic phleboliths. Symmetric seminal vesicles.    No inguinal lymphadenopathy. Subcentimeter pelvic lymph nodes with no  worrisome features. Subcentimeter retroperitoneal and mesenteric lymph  nodes, not enlarged by size criteria. No abdominal aortic aneurysm.  Mild atherosclerotic  calcifications of the abdominal aorta and its  major branches.    Decompressed stomach. No dilated loops of bowel. No bowel wall  thickening. The appendix is unremarkable. No free fluid. No free air.    Bones: Mild degenerative changes of the spine, bilateral SI joints and  hips. No aggressive bone lesions. Mild lumbar curvature with convexity  to the left. Minimal retrolistheses of L5 over S1 with marked disc  height narrowing and intradiscal discal gas.      Impression    IMPRESSION:  1. Pulmonary nodules, stable from the prior studies. No new or  enlarging pulmonary nodules.  2. No convincing evidence for metastatic disease in the abdomen and  pelvis.    CHAUNCEY SÁNCHEZ MD           Assessment/Plan  [id number] orophyarngeal squamous cell cancer - He is now 4.5 years out from completion of therapy without evidence of disease.  He will see Dr. Bundy in the coming few weeks.  I will see him backin about 6 months for his 5 year scan.      TSH - wnl today.       I spent 25 minutes with the patient.  >50% of the time was spent in counseling and coordination of care.    Janes Romo   of Medicine  Division of Hematology, Oncology, and Transplantation

## 2018-11-13 NOTE — DISCHARGE INSTRUCTIONS

## 2018-11-13 NOTE — LETTER
11/13/2018       RE: Jose Wise  [address]  [place] [address]     Dear Colleague,    Thank you for referring your patient, Jose Wise, to the [facility]. Please see a copy of my visit note below.    error    REASON FOR VISIT:    CANCER STAGE: MALIG NEOPLASM TONSILLAR FOSSA    Staging form: Pharynx - Oropharynx, AJCC 7th Edition    - Clinical: Stage KWADWO (T2, N2b, M0) - Signed by Janes Romo DO on 11/8/2016      HISTORY OF PRESENT ILLNESS:  - 10/2013 lump in his R neck.  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 There was no LN element so it could be a soft tissue mass.  - 1/9/14 He was referred to Dr. Jayashree Alvarez at [facility] who perfromed a laryngoscopy on  revealed enlarged R tonsil.    - 1/15/14 Direct laryngoscopy and a biopsy of the R tonsil was identified squamous cell carcinoma.  P16+.     - 1/9/14 PET/CT showed a hypermetabolic tonsillar mass and 2 hypermetabolic LNs on the R side in level 2, measured 2.1cm.  No evidence of distant mets, but multiple tiny pulmonary nodules were seen, felt unlikely to be mets.     -1/17/14 he consulted radiation oncology and Dr. Romo in medical oncology.     -1/27/14 started on chemorads with HD cisplatin. He developed ototoxicity and was switched to carboplatin (AUC 6) for day 22.  He did not receive carboplatin day 43 due to thrombocytopenia.    Radiation therapy completed on 3/14/14 (7000cGy).    - His PET/CT after treatment showed a complete response  Jose returns to clinic today. He is 4.5 years out from completion of therapy. He is doing very well. He continues to have some dry mouth and some hearing loss, but otherwise is doing well. He was recently diagnosed with rosacea and has started taking doxycycline and metronidazole cream. He did not tolerate the metronidzole so is just using doxy. He otherwise feels weel. He has no other chronic concerns from his prior cancer treatment.  He has no trismus. He is  "seeing a dentist regularly and has no difficulties there.  He is otherwise without complaints.     Review Of Systems  10-point review of systems were negative except as noted in HPI.        EXAM:  Blood pressure 132/84, pulse 72, temperature 97.3  F (36.3  C), temperature source Oral, resp. rate 16, height 1.69 m (5' 6.53\"), weight 70.8 kg (156 lb), SpO2 100 %.  GEN: alert and oriented x 3, nad  HEENT: perrla, eomi, sclera anicteric, oral mucosa moist without thrush  NECK: supple, no palpable LAD  HT: reg rate and rhythm, no murmurs  LUNGS: clear to auscultation bilaterally  ABD: soft, nt, nd, +bs x 4  EXT: no clubbing, cyanosis, or edema  NEURO: CN 2-12 intact, MS 5/5 b/l    Current Outpatient Prescriptions   Medication Sig Dispense Refill     albuterol (VENTOLIN HFA) 108 (90 BASE) MCG/ACT inhaler Inhale 2 puffs into the lungs 4 times daily as needed 1 Inhaler 11     calcium carbonate (OS-KAVYA 500 MG [place]. [place]) 500 MG tablet Take by mouth 2 times daily       doxycycline (VIBRAMYCIN) 100 MG capsule TAKE 1 CAPSULE BY MOUTH TWICE A DAY  2     Glucosamine-Chondroitin (GLUCOSAMINE CHONDR COMPLEX PO) Take 1,000 mg by mouth       loratadine (CLARITIN) 10 MG tablet Take 1 tablet (10 mg) by mouth daily 30 tablet 11     metroNIDAZOLE (METROCREAM) 0.75 % cream Apply topically 2 times daily as needed 45 g 11     mometasone (ASMANEX 30 METERED DOSES) 220 MCG/INH Inhaler Inhale 1 puff into the lungs daily 3 Inhaler 3     Multiple Vitamin (MULTI-VITAMIN PO) Take  by mouth. Occasionally         Omega-3 Fatty Acids (FISH OIL) 1200 MG CAPS        OMEGA-3 FATTY ACIDS-VITAMIN E PO Take 1,000 capsules by mouth       omeprazole (PRILOSEC) 20 MG CR capsule TAKE 1 CAPSULE BY MOUTH EVERY DAY 90 capsule 3     ranitidine (ZANTAC) 300 MG tablet        UNABLE TO FIND              Recent Labs   Lab Test  11/13/18   1318   WBC  4.3   HGB  13.4   PLT  174     @labrcent[na,potassium,chloride,co2,bun,cr@  Recent Labs   Lab Test  11/13/18   1318 "   PROTTOTAL  6.6*   ALBUMIN  3.7   BILITOTAL  0.6   AST  28   ALT  29   ALKPHOS  96         Recent Results (from the past 744 hour(s))   CT Soft Tissue Neck w Contrast    Narrative    CT SOFT TISSUE NECK W CONTRAST 11/13/2018 1:02 PM    History:  surveillance scan locally advanced H&N cancer;  Malignant neoplasm of tonsillar fossa (H)  ICD-10: Malignant neoplasm of tonsillar fossa (H)      Comparison:  CT neck 11/14/2017: 11/7/2016, 9/14/2015, PET/CT 1/9/2014      Technique: Following intravenous administration of nonionic iodinated  contrast medium, thin section helical CT images were obtained from the  skull base down to the level of the aortic arch.  Axial, coronal and  sagittal reformations were performed with 2-3 mm slice thickness  reconstruction. Images were reviewed in soft tissue, lung and bone  windows.    Contrast: ISOVUE 370 95cc    Findings:   Evaluation of the mucosal space demonstrates no evident abnormality  the nasopharynx, oropharynx, hypopharynx or the glottis. There is no  evidence of residual tumor. The tongue base appears normal. Atrophy of  the right submandibular and right parotid gland, similar to prior,  likely related to radiation change. The salivary glands are otherwise  unremarkable in appearance. The thyroid gland appears normal.    There is no evident cervical lymphadenopathy. The fascial spaces in  the neck are intact bilaterally. The major vascular structures in the  neck appear unremarkable.    Evaluation of the osseous structures demonstrates no worrisome lytic  or sclerotic lesions. There is disc space narrowing at C5-C6, with  disc osteophyte complex resulting mild-to-moderate spinal canal  stenosis and mild bilateral neural foraminal narrowing.    The visualized lung apices are clear.      Impression    Impression:  1. In this patient with a history of squamous cell carcinoma status  post chemotherapy and radiation, there is no evidence of local tumor  recurrence.  2. No  cervical lymphadenopathy.  3. Stable degenerative changes at C5-6, with mild-to-moderate spinal  canal stenosis and mild bilateral neural foraminal narrowing.    I have personally reviewed the examination and initial interpretation  and I agree with the findings.    SCOTT FATIMA MD   CT Chest/Abdomen/Pelvis w Contrast    Narrative    EXAMINATION: CT CHEST/ABDOMEN/PELVIS W CONTRAST, 11/13/2018 1:02 PM    TECHNIQUE:  Helical CT images from the thoracic inlet through the  symphysis pubis were obtained  with contrast. Contrast dose: ISOVUE  370 95cc    COMPARISON: 11/14/2017; PET/CT 5/30/2014.    HISTORY: surveillance scan locally advanced H&N tonsillar cancer  with pulmonary nodules; Malignant neoplasm of tonsillar fossa (H)    FINDINGS:    LUNGS: Mild bilateral lower lobes dependent atelectasis. No acute  airspace opacities. Subcentimeter pulmonary nodules measuring up to 4  mm in the right lower lobe and 4 mm in the left lower lobe, stable. No  new or enlarging pulmonary nodules.    Chest: Unremarkable thyroid gland. Central tracheobronchial tree is  patent. Thoracic aorta and main pulmonary artery with normal diameter.  No central pulmonary embolism. Cardiac size within normal limits. No  pericardial effusions. No pleural effusions. Minimal fluid in the  superior aortic recess. No pneumothorax. Subcentimeter mediastinal and  right hilar lymph nodes, not enlarged by size criteria, stable. No  thoracic lymphadenopathy.    Abdomen and pelvis: Focal fat deposition along the falciform ligament.  No suspicious liver lesions. Patent hepatic vasculature. No biliary  tree dilation. Unremarkable gallbladder. Homogeneous pancreatic  parenchyma. Main pancreatic duct is not dilated. The spleen is not  enlarged. No focal splenic lesions.    Unremarkable adrenal glands. No renal stones or hydronephrosis.  Unremarkable urinary bladder. The prostate is not enlarged. Prostate  calcifications. Pelvic phleboliths. Symmetric seminal  vesicles.    No inguinal lymphadenopathy. Subcentimeter pelvic lymph nodes with no  worrisome features. Subcentimeter retroperitoneal and mesenteric lymph  nodes, not enlarged by size criteria. No abdominal aortic aneurysm.  Mild atherosclerotic calcifications of the abdominal aorta and its  major branches.    Decompressed stomach. No dilated loops of bowel. No bowel wall  thickening. The appendix is unremarkable. No free fluid. No free air.    Bones: Mild degenerative changes of the spine, bilateral SI joints and  hips. No aggressive bone lesions. Mild lumbar curvature with convexity  to the left. Minimal retrolistheses of L5 over S1 with marked disc  height narrowing and intradiscal discal gas.      Impression    IMPRESSION:  1. Pulmonary nodules, stable from the prior studies. No new or  enlarging pulmonary nodules.  2. No convincing evidence for metastatic disease in the abdomen and  pelvis.    CHAUNCEY SÁNCHEZ MD           Assessment/Plan  [id number] orophyarngeal squamous cell cancer - He is now 4.5 years out from completion of therapy without evidence of disease.  He will see Dr. Bundy in the coming few weeks.  I will see him backin about 6 months for his 5 year scan.      TSH - wnl today.       I spent 25 minutes with the patient.  >50% of the time was spent in counseling and coordination of care.    Janes Romo   of Medicine  Division of Hematology, Oncology, and Transplantation

## 2018-12-12 ENCOUNTER — OFFICE VISIT (OUTPATIENT)
Dept: OTOLARYNGOLOGY | Facility: CLINIC | Age: 58
End: 2018-12-12
Payer: COMMERCIAL

## 2018-12-12 VITALS — WEIGHT: 159.8 LBS | BODY MASS INDEX: 25.68 KG/M2 | HEIGHT: 66 IN

## 2018-12-12 DIAGNOSIS — C09.0 MALIGNANT NEOPLASM OF TONSILLAR FOSSA (H): Primary | ICD-10-CM

## 2018-12-12 ASSESSMENT — MIFFLIN-ST. JEOR: SCORE: 1482.6

## 2018-12-12 ASSESSMENT — PAIN SCALES - GENERAL: PAINLEVEL: NO PAIN (0)

## 2018-12-12 NOTE — NURSING NOTE
Chief Complaint   Patient presents with     RECHECK     routine follow up      Nnamdi Vazquez, EMT

## 2018-12-12 NOTE — PATIENT INSTRUCTIONS
1. Please follow-up in clinic in 1 year with Dr. Bundy.   2. Please call the ENT clinic with any questions,concerns, new or worsening symptoms.    -Clinic number is [phone]   - Rosa's direct line (Dr. Bundy's nurse) [phone]

## 2018-12-12 NOTE — LETTER
12/12/2018      RE: Jose Wise  [address]  [facility] [address]       Dear Dr. Alvarez:    I had the pleasure of seeing Jose Wise in follow-up today at the [facility].     History of Present Illness:   Mr Wise is a 58 year old man with a history of a [id number] SCC of the right tonsil. He was treated with concurrent chemoradiation, completed 3/14/2014. His post-treatment PET showed complete treatment response.     Interval history:   He comes in today for follow-up.  He was last seen in clinic in March 2018.  He is overall doing well.  He recently saw Dr. Romo in follow-up with recent imaging that did not show recurrent disease.  He has been continuing his treatment for the reflux.  He is on omeprazole which he says helps.  He also thinks swallowing exercises continue to help with his swallowing.  He denies any sore throat, hemoptysis, neck masses.  He has bilateral itchy ears intermittently.  He does have decreased range of motion of his neck which he thinks is secondary to arthritis.  He has no other changes in his health.  He is potentially retiring in 6 months.        MEDICATIONS:     Current Outpatient Medications   Medication Sig Dispense Refill     albuterol (VENTOLIN HFA) 108 (90 BASE) MCG/ACT inhaler Inhale 2 puffs into the lungs 4 times daily as needed 1 Inhaler 11     calcium carbonate (OS-KAVYA 500 MG [place]. CA) 500 MG tablet Take by mouth 2 times daily       doxycycline (VIBRAMYCIN) 100 MG capsule TAKE 1 CAPSULE BY MOUTH TWICE A DAY  2     Glucosamine-Chondroitin (GLUCOSAMINE CHONDR COMPLEX PO) Take 1,000 mg by mouth       loratadine (CLARITIN) 10 MG tablet Take 1 tablet (10 mg) by mouth daily 30 tablet 11     metroNIDAZOLE (METROCREAM) 0.75 % cream Apply topically 2 times daily as needed 45 g 11     mometasone (ASMANEX 30 METERED DOSES) 220 MCG/INH Inhaler Inhale 1 puff into the lungs daily 3 Inhaler 3     Multiple Vitamin (MULTI-VITAMIN PO) Take  by  mouth. Occasionally         Omega-3 Fatty Acids (FISH OIL) 1200 MG CAPS        OMEGA-3 FATTY ACIDS-VITAMIN E PO Take 1,000 capsules by mouth       omeprazole (PRILOSEC) 20 MG CR capsule TAKE 1 CAPSULE BY MOUTH EVERY DAY 90 capsule 3     ranitidine (ZANTAC) 300 MG tablet        UNABLE TO FIND          ALLERGIES:    Allergies   Allergen Reactions     Animal Dander Itching     Pet hair causes watery eyes and sneezing     Grass Itching     Red tipped grasses, sneezing and watery eyes       HABITS/SOCIAL HISTORY:     Quit smoking years ago    PAST MEDICAL HISTORY:   Past Medical History:   Diagnosis Date     Asthma      Benign positional vertigo      Chemical dependency (H)      Communic dis contact NEC      Gastroesophageal reflux disease      History of radiation therapy      Hoarseness      Impacted cerumen 10/14/2013     Impaired fasting glucose 9/4/2012     Lateral epicondylitis  of elbow      Pain in joint, upper arm      Pure hypercholesterolemia      Rosacea 12/5/2012     Seasonal allergies      Spasm of muscle      Substance abuse (H)     POLYSUBSTANCE     Tension headache, chronic 10/17/2012     Tinea versicolor 12/5/2012     Tinnitus      Tonsillar cancer (H) 2013    chemo and radiation     Unspecified asthma(493.90)     takes daily inhaler, never been intubated, no attacks  since 2012        FAMILY HISTORY:    Family History   Problem Relation Age of Onset     Circulatory Mother 45        cerebral aneurysm     Alcohol/Drug Mother         sibling     Other - See Comments Mother         aneurysm     Substance Abuse Mother      Cancer Mother      Cardiovascular Father         Heart Attack     Coronary Artery Disease Father         heart attack/ triple bypass     Cancer Father         skin cancer     Skin Cancer Father      Other Cancer Brother         upper thorax     Substance Abuse Brother      Coronary Artery Disease Brother         stent     Cancer Sister         cervical     Cancer  "Brother         throat cancer     Gastrointestinal Disease Son         crohn's disease/ colostomy     Cancer Sister      Cancer Brother      Melanoma No family hx of        REVIEW OF SYSTEMS:  12 point ROS was negative other than the symptoms noted above in the HPI.  Patient Supplied Answers to Review of Systems  [facility] ROS 12/5/2018   Constitutional -   Neurology Dizzy spells, Headache   Ears, Nose, Throat Ringing/noise in ears   Gastrointestinal/Genitourinary -   Musculoskeletal Sore or stiff joints, Neck pain   Allergy/Immunology -   Endocrine -         PHYSICAL EXAMINATION:   Ht 1.676 m (5' 6\")   Wt 72.5 kg (159 lb 12.8 oz)   BMI 25.79 kg/m     Appearance:   normal; NAD, age-appropriate appearance, well-developed, normal habitus   Communication:   normal; communicates verbally, normal voice quality   Head/Face:   inspection -  Normal; no scars or visible lesions   Salivary glands -  Normal size, no tenderness, swelling, or palpable masses   Facial strength -  Normal and symmetric bilateral; H/B I/VI   Skin:  normal, no rash   Ocular Motility:  normal occular movements   Ears:      auricle (AD) -  normal  EAC (AD) -  normal  TM (AD) -  Normal, no effusion  auricle (AS) -  normal  EAC (AS) -  normal  TM (AS) -  Normal, no effusion  Normal clinical speech reception   Nose:  Ext. inspection -  Normal  Internal Inspection -  Normal mucosa, septum, and turbinates   Nasopharynx:  normal mucosa, no masses   Oral Cavity:  lips -  Normal mucosa, oral competence, and stoma size   Age-appropriate dentition, healthy gingival mucosa   Hard palate, buccal, floor of mouth mucosa normal   Tongue - normal movement, no lesions, no palpable lesions   Oropharynx:  mucosa -  Normal, no lesions  soft palate -  Normal, no lesions, no asymmetry, normal elevation  No palpable masses in BOT or tonsillar fossa   Hypopharynx:  Normal pyriform sinus and pharyngeal wall mucosa with radiation changes   No pooled secretions    Larynx:  " Epiglottis, false vocal cord, true vocal cord normal in appearance, bilaterally mobile cords  Radiation changes to mucosa   Neck: No visible mass or asymmetry   No palpable mass  Mild radiation fibrosis  Normal range of motion   Lymphatic:  no abnormal nodes   Cardiovascular: warm, pink, well-perfused extremities without swelling, tenderness, or edema   Respiratory: Normal respiratory effort, no stridor   Neuro/Psych.:  mood/affect -  normal  mental status -  normal  cranial nerves -  normal        PROCEDURES:   Flexible fiberoptic laryngoscopy: Verbal consent was obtained. The nasal cavity was prepped with an aerosolized solution of topical anesthetic and vasoconstrictive agent. The scope was passed through the anterior nasal cavity and advanced. Inspection of the nasopharynx revealed no gross abnormality. Inspection of the larynx revealed bilaterally mobile vocal cords. Pyriform sinuses are symmetric. No intra-arytenoid irregularities noted. The epiglottis, aryepiglottic folds, vallecula and base of tongue are unremarkable. There are radiation changes to the mucosa. The airway is patent. Procedure tolerated well with no immediate complications noted.    RESULTS REVIEWED:  I reviewed the notes from Dr. Romo as well as the imaging results    CT neck: I independently reviewed the images which show posttreatment changes but no evidence of local or regional recurrence    Evaluation of the mucosal space demonstrates no evident abnormality  the nasopharynx, oropharynx, hypopharynx or the glottis. There is no  evidence of residual tumor. The tongue base appears normal. Atrophy of  the right submandibular and right parotid gland, similar to prior,  likely related to radiation change. The salivary glands are otherwise  unremarkable in appearance. The thyroid gland appears normal.     There is no evident cervical lymphadenopathy. The fascial spaces in  the neck are intact bilaterally. The major vascular structures in  the  neck appear unremarkable.     Evaluation of the osseous structures demonstrates no worrisome lytic  or sclerotic lesions. There is disc space narrowing at C5-C6, with  disc osteophyte complex resulting mild-to-moderate spinal canal  stenosis and mild bilateral neural foraminal narrowing.     The visualized lung apices are clear.                                                                      Impression:  1. In this patient with a history of squamous cell carcinoma status  post chemotherapy and radiation, there is no evidence of local tumor  recurrence.  2. No cervical lymphadenopathy.  3. Stable degenerative changes at C5-6, with mild-to-moderate spinal  canal stenosis and mild bilateral neural foraminal narrowing.         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  He has no evidence of recurrent disease.  Long-term care with swallowing exercises and dental care is going to be critical for him. He is having repeat imaging in 2019 with Dr Romo. I will see him back in about a year.    Thank you very much for the opportunity to participate in the care of your patient.      Yazmin Bundy M.D.  Otolaryngology- Head & Neck Surgery      CC:  Janes Romo   of Medicine  Division of Hematology, Oncology, and Transplantation    Valery Carreon MD  Department of Radiation Oncology  [facility]      Yazmin Bundy MD

## 2018-12-12 NOTE — LETTER
12/12/2018       RE: Jose Wise  [address]  [place] [address]     Dear Colleague,    Thank you for referring your patient, Jose Wise, to the [facility]AT at [facility]. Please see a copy of my visit note below.        Dear Dr. Alvarez:    I had the pleasure of seeing Jose Wise in follow-up today at the [facility].     History of Present Illness:   Mr Wise is a 58 year old man with a history of a [id number] SCC of the right tonsil. He was treated with concurrent chemoradiation, completed 3/14/2014. His post-treatment PET showed complete treatment response.     Interval history:   He comes in today for follow-up.  He was last seen in clinic in March 2018.  He is overall doing well.  He recently saw Dr. Romo in follow-up with recent imaging that did not show recurrent disease.  He has been continuing his treatment for the reflux.  He is on omeprazole which he says helps.  He also thinks swallowing exercises continue to help with his swallowing.  He denies any sore throat, hemoptysis, neck masses.  He has bilateral itchy ears intermittently.  He does have decreased range of motion of his neck which he thinks is secondary to arthritis.  He has no other changes in his health.  He is potentially retiring in 6 months.        MEDICATIONS:     Current Outpatient Medications   Medication Sig Dispense Refill     albuterol (VENTOLIN HFA) 108 (90 BASE) MCG/ACT inhaler Inhale 2 puffs into the lungs 4 times daily as needed 1 Inhaler 11     calcium carbonate (OS-KAVYA 500 MG [place]. CA) 500 MG tablet Take by mouth 2 times daily       doxycycline (VIBRAMYCIN) 100 MG capsule TAKE 1 CAPSULE BY MOUTH TWICE A DAY  2     Glucosamine-Chondroitin (GLUCOSAMINE CHONDR COMPLEX PO) Take 1,000 mg by mouth       loratadine (CLARITIN) 10 MG tablet Take 1 tablet (10 mg) by mouth daily 30 tablet 11     metroNIDAZOLE (METROCREAM) 0.75 % cream  Apply topically 2 times daily as needed 45 g 11     mometasone (ASMANEX 30 METERED DOSES) 220 MCG/INH Inhaler Inhale 1 puff into the lungs daily 3 Inhaler 3     Multiple Vitamin (MULTI-VITAMIN PO) Take  by mouth. Occasionally         Omega-3 Fatty Acids (FISH OIL) 1200 MG CAPS        OMEGA-3 FATTY ACIDS-VITAMIN E PO Take 1,000 capsules by mouth       omeprazole (PRILOSEC) 20 MG CR capsule TAKE 1 CAPSULE BY MOUTH EVERY DAY 90 capsule 3     ranitidine (ZANTAC) 300 MG tablet        UNABLE TO FIND          ALLERGIES:    Allergies   Allergen Reactions     Animal Dander Itching     Pet hair causes watery eyes and sneezing     Grass Itching     Red tipped grasses, sneezing and watery eyes       HABITS/SOCIAL HISTORY:     Quit smoking years ago    PAST MEDICAL HISTORY:   Past Medical History:   Diagnosis Date     Asthma      Benign positional vertigo      Chemical dependency (H)      Communic dis contact NEC      Gastroesophageal reflux disease      History of radiation therapy      Hoarseness      Impacted cerumen 10/14/2013     Impaired fasting glucose 9/4/2012     Lateral epicondylitis  of elbow      Pain in joint, upper arm      Pure hypercholesterolemia      Rosacea 12/5/2012     Seasonal allergies      Spasm of muscle      Substance abuse (H)     POLYSUBSTANCE     Tension headache, chronic 10/17/2012     Tinea versicolor 12/5/2012     Tinnitus      Tonsillar cancer (H) 2013    chemo and radiation     Unspecified asthma(493.90)     takes daily inhaler, never been intubated, no attacks  since 2012        FAMILY HISTORY:    Family History   Problem Relation Age of Onset     Circulatory Mother 45        cerebral aneurysm     Alcohol/Drug Mother         sibling     Other - See Comments Mother         aneurysm     Substance Abuse Mother      Cancer Mother      Cardiovascular Father         Heart Attack     Coronary Artery Disease Father         heart attack/ triple bypass     Cancer Father         skin  "cancer     Skin Cancer Father      Other Cancer Brother         upper thorax     Substance Abuse Brother      Coronary Artery Disease Brother         stent     Cancer Sister         cervical     Cancer Brother         throat cancer     Gastrointestinal Disease Son         crohn's disease/ colostomy     Cancer Sister      Cancer Brother      Melanoma No family hx of        REVIEW OF SYSTEMS:  12 point ROS was negative other than the symptoms noted above in the HPI.  Patient Supplied Answers to Review of Systems  [facility] E[facility] ROS 12/5/2018   Constitutional -   Neurology Dizzy spells, Headache   Ears, Nose, Throat Ringing/noise in ears   Gastrointestinal/Genitourinary -   Musculoskeletal Sore or stiff joints, Neck pain   Allergy/Immunology -   Endocrine -         PHYSICAL EXAMINATION:   Ht 1.676 m (5' 6\")   Wt 72.5 kg (159 lb 12.8 oz)   BMI 25.79 kg/m     Appearance:   normal; NAD, age-appropriate appearance, well-developed, normal habitus   Communication:   normal; communicates verbally, normal voice quality   Head/Face:   inspection -  Normal; no scars or visible lesions   Salivary glands -  Normal size, no tenderness, swelling, or palpable masses   Facial strength -  Normal and symmetric bilateral; H/B I/VI   Skin:  normal, no rash   Ocular Motility:  normal occular movements   Ears:      auricle (AD) -  normal  EAC (AD) -  normal  TM (AD) -  Normal, no effusion  auricle (AS) -  normal  EAC (AS) -  normal  TM (AS) -  Normal, no effusion  Normal clinical speech reception   Nose:  Ext. inspection -  Normal  Internal Inspection -  Normal mucosa, septum, and turbinates   Nasopharynx:  normal mucosa, no masses   Oral Cavity:  lips -  Normal mucosa, oral competence, and stoma size   Age-appropriate dentition, healthy gingival mucosa   Hard palate, buccal, floor of mouth mucosa normal   Tongue - normal movement, no lesions, no palpable lesions   Oropharynx:  mucosa -  Normal, no lesions  soft palate -  Normal, no lesions, no " asymmetry, normal elevation  No palpable masses in BOT or tonsillar fossa   Hypopharynx:  Normal pyriform sinus and pharyngeal wall mucosa with radiation changes   No pooled secretions    Larynx:  Epiglottis, false vocal cord, true vocal cord normal in appearance, bilaterally mobile cords  Radiation changes to mucosa   Neck: No visible mass or asymmetry   No palpable mass  Mild radiation fibrosis  Normal range of motion   Lymphatic:  no abnormal nodes   Cardiovascular: warm, pink, well-perfused extremities without swelling, tenderness, or edema   Respiratory: Normal respiratory effort, no stridor   Neuro/Psych.:  mood/affect -  normal  mental status -  normal  cranial nerves -  normal        PROCEDURES:   Flexible fiberoptic laryngoscopy: Verbal consent was obtained. The nasal cavity was prepped with an aerosolized solution of topical anesthetic and vasoconstrictive agent. The scope was passed through the anterior nasal cavity and advanced. Inspection of the nasopharynx revealed no gross abnormality. Inspection of the larynx revealed bilaterally mobile vocal cords. Pyriform sinuses are symmetric. No intra-arytenoid irregularities noted. The epiglottis, aryepiglottic folds, vallecula and base of tongue are unremarkable. There are radiation changes to the mucosa. The airway is patent. Procedure tolerated well with no immediate complications noted.    RESULTS REVIEWED:  I reviewed the notes from Dr. Romo as well as the imaging results    CT neck: I independently reviewed the images which show posttreatment changes but no evidence of local or regional recurrence    Evaluation of the mucosal space demonstrates no evident abnormality  the nasopharynx, oropharynx, hypopharynx or the glottis. There is no  evidence of residual tumor. The tongue base appears normal. Atrophy of  the right submandibular and right parotid gland, similar to prior,  likely related to radiation change. The salivary glands are  otherwise  unremarkable in appearance. The thyroid gland appears normal.     There is no evident cervical lymphadenopathy. The fascial spaces in  the neck are intact bilaterally. The major vascular structures in the  neck appear unremarkable.     Evaluation of the osseous structures demonstrates no worrisome lytic  or sclerotic lesions. There is disc space narrowing at C5-C6, with  disc osteophyte complex resulting mild-to-moderate spinal canal  stenosis and mild bilateral neural foraminal narrowing.     The visualized lung apices are clear.                                                                      Impression:  1. In this patient with a history of squamous cell carcinoma status  post chemotherapy and radiation, there is no evidence of local tumor  recurrence.  2. No cervical lymphadenopathy.  3. Stable degenerative changes at C5-6, with mild-to-moderate spinal  canal stenosis and mild bilateral neural foraminal narrowing.         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  He has no evidence of recurrent disease.  Long-term care with swallowing exercises and dental care is going to be critical for him. He is having repeat imaging in 2019 with Dr Romo. I will see him back in about a year.    Thank you very much for the opportunity to participate in the care of your patient.      Yazmin Bundy M.D.  Otolaryngology- Head & Neck Surgery      CC:  Janes Romo   of Medicine  Division of Hematology, Oncology, and Transplantation    Valery Carreon MD  Department of Radiation Oncology  [facility]  Again, thank you for allowing me to participate in the care of your patient.      Sincerely,    Yazmin Bundy MD

## 2018-12-13 NOTE — PROGRESS NOTES
Dear Dr. Alvarez:    I had the pleasure of seeing Jose Wise in follow-up today at the [facility].     History of Present Illness:   Mr Wise is a 58 year old man with a history of a [id number] SCC of the right tonsil. He was treated with concurrent chemoradiation, completed 3/14/2014. His post-treatment PET showed complete treatment response.     Interval history:   He comes in today for follow-up.  He was last seen in clinic in March 2018.  He is overall doing well.  He recently saw Dr. Romo in follow-up with recent imaging that did not show recurrent disease.  He has been continuing his treatment for the reflux.  He is on omeprazole which he says helps.  He also thinks swallowing exercises continue to help with his swallowing.  He denies any sore throat, hemoptysis, neck masses.  He has bilateral itchy ears intermittently.  He does have decreased range of motion of his neck which he thinks is secondary to arthritis.  He has no other changes in his health.  He is potentially retiring in 6 months.        MEDICATIONS:     Current Outpatient Medications   Medication Sig Dispense Refill     albuterol (VENTOLIN HFA) 108 (90 BASE) MCG/ACT inhaler Inhale 2 puffs into the lungs 4 times daily as needed 1 Inhaler 11     calcium carbonate (OS-KAVYA 500 MG [place]. CA) 500 MG tablet Take by mouth 2 times daily       doxycycline (VIBRAMYCIN) 100 MG capsule TAKE 1 CAPSULE BY MOUTH TWICE A DAY  2     Glucosamine-Chondroitin (GLUCOSAMINE CHONDR COMPLEX PO) Take 1,000 mg by mouth       loratadine (CLARITIN) 10 MG tablet Take 1 tablet (10 mg) by mouth daily 30 tablet 11     metroNIDAZOLE (METROCREAM) 0.75 % cream Apply topically 2 times daily as needed 45 g 11     mometasone (ASMANEX 30 METERED DOSES) 220 MCG/INH Inhaler Inhale 1 puff into the lungs daily 3 Inhaler 3     Multiple Vitamin (MULTI-VITAMIN PO) Take  by mouth. Occasionally         Omega-3 Fatty Acids (FISH OIL) 1200 MG CAPS         OMEGA-3 FATTY ACIDS-VITAMIN E PO Take 1,000 capsules by mouth       omeprazole (PRILOSEC) 20 MG CR capsule TAKE 1 CAPSULE BY MOUTH EVERY DAY 90 capsule 3     ranitidine (ZANTAC) 300 MG tablet        UNABLE TO FIND          ALLERGIES:    Allergies   Allergen Reactions     Animal Dander Itching     Pet hair causes watery eyes and sneezing     Grass Itching     Red tipped grasses, sneezing and watery eyes       HABITS/SOCIAL HISTORY:     Quit smoking years ago    PAST MEDICAL HISTORY:   Past Medical History:   Diagnosis Date     Asthma      Benign positional vertigo      Chemical dependency (H)      Communic dis contact NEC      Gastroesophageal reflux disease      History of radiation therapy      Hoarseness      Impacted cerumen 10/14/2013     Impaired fasting glucose 9/4/2012     Lateral epicondylitis  of elbow      Pain in joint, upper arm      Pure hypercholesterolemia      Rosacea 12/5/2012     Seasonal allergies      Spasm of muscle      Substance abuse (H)     POLYSUBSTANCE     Tension headache, chronic 10/17/2012     Tinea versicolor 12/5/2012     Tinnitus      Tonsillar cancer (H) 2013    chemo and radiation     Unspecified asthma(493.90)     takes daily inhaler, never been intubated, no attacks  since 2012        FAMILY HISTORY:    Family History   Problem Relation Age of Onset     Circulatory Mother 45        cerebral aneurysm     Alcohol/Drug Mother         sibling     Other - See Comments Mother         aneurysm     Substance Abuse Mother      Cancer Mother      Cardiovascular Father         Heart Attack     Coronary Artery Disease Father         heart attack/ triple bypass     Cancer Father         skin cancer     Skin Cancer Father      Other Cancer Brother         upper thorax     Substance Abuse Brother      Coronary Artery Disease Brother         stent     Cancer Sister         cervical     Cancer Brother         throat cancer     Gastrointestinal Disease Son         crohn's  "disease/ colostomy     Cancer Sister      Cancer Brother      Melanoma No family hx of        REVIEW OF SYSTEMS:  12 point ROS was negative other than the symptoms noted above in the HPI.  Patient Supplied Answers to Review of Systems  [facility] ENT ROS 12/5/2018   Constitutional -   Neurology Dizzy spells, Headache   Ears, Nose, Throat Ringing/noise in ears   Gastrointestinal/Genitourinary -   Musculoskeletal Sore or stiff joints, Neck pain   Allergy/Immunology -   Endocrine -         PHYSICAL EXAMINATION:   Ht 1.676 m (5' 6\")   Wt 72.5 kg (159 lb 12.8 oz)   BMI 25.79 kg/m    Appearance:   normal; NAD, age-appropriate appearance, well-developed, normal habitus   Communication:   normal; communicates verbally, normal voice quality   Head/Face:   inspection -  Normal; no scars or visible lesions   Salivary glands -  Normal size, no tenderness, swelling, or palpable masses   Facial strength -  Normal and symmetric bilateral; H/B I/VI   Skin:  normal, no rash   Ocular Motility:  normal occular movements   Ears:      auricle (AD) -  normal  EAC (AD) -  normal  TM (AD) -  Normal, no effusion  auricle (AS) -  normal  EAC (AS) -  normal  TM (AS) -  Normal, no effusion  Normal clinical speech reception   Nose:  Ext. inspection -  Normal  Internal Inspection -  Normal mucosa, septum, and turbinates   Nasopharynx:  normal mucosa, no masses   Oral Cavity:  lips -  Normal mucosa, oral competence, and stoma size   Age-appropriate dentition, healthy gingival mucosa   Hard palate, buccal, floor of mouth mucosa normal   Tongue - normal movement, no lesions, no palpable lesions   Oropharynx:  mucosa -  Normal, no lesions  soft palate -  Normal, no lesions, no asymmetry, normal elevation  No palpable masses in BOT or tonsillar fossa   Hypopharynx:  Normal pyriform sinus and pharyngeal wall mucosa with radiation changes   No pooled secretions    Larynx:  Epiglottis, false vocal cord, true vocal cord normal in appearance, bilaterally mobile " cords  Radiation changes to mucosa   Neck: No visible mass or asymmetry   No palpable mass  Mild radiation fibrosis  Normal range of motion   Lymphatic:  no abnormal nodes   Cardiovascular: warm, pink, well-perfused extremities without swelling, tenderness, or edema   Respiratory: Normal respiratory effort, no stridor   Neuro/Psych.:  mood/affect -  normal  mental status -  normal  cranial nerves -  normal        PROCEDURES:   Flexible fiberoptic laryngoscopy: Verbal consent was obtained. The nasal cavity was prepped with an aerosolized solution of topical anesthetic and vasoconstrictive agent. The scope was passed through the anterior nasal cavity and advanced. Inspection of the nasopharynx revealed no gross abnormality. Inspection of the larynx revealed bilaterally mobile vocal cords. Pyriform sinuses are symmetric. No intra-arytenoid irregularities noted. The epiglottis, aryepiglottic folds, vallecula and base of tongue are unremarkable. There are radiation changes to the mucosa. The airway is patent. Procedure tolerated well with no immediate complications noted.    RESULTS REVIEWED:  I reviewed the notes from Dr. Romo as well as the imaging results    CT neck: I independently reviewed the images which show posttreatment changes but no evidence of local or regional recurrence    Evaluation of the mucosal space demonstrates no evident abnormality  the nasopharynx, oropharynx, hypopharynx or the glottis. There is no  evidence of residual tumor. The tongue base appears normal. Atrophy of  the right submandibular and right parotid gland, similar to prior,  likely related to radiation change. The salivary glands are otherwise  unremarkable in appearance. The thyroid gland appears normal.     There is no evident cervical lymphadenopathy. The fascial spaces in  the neck are intact bilaterally. The major vascular structures in the  neck appear unremarkable.     Evaluation of the osseous structures demonstrates no  worrisome lytic  or sclerotic lesions. There is disc space narrowing at C5-C6, with  disc osteophyte complex resulting mild-to-moderate spinal canal  stenosis and mild bilateral neural foraminal narrowing.     The visualized lung apices are clear.                                                                      Impression:  1. In this patient with a history of squamous cell carcinoma status  post chemotherapy and radiation, there is no evidence of local tumor  recurrence.  2. No cervical lymphadenopathy.  3. Stable degenerative changes at C5-6, with mild-to-moderate spinal  canal stenosis and mild bilateral neural foraminal narrowing.         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  He has no evidence of recurrent disease.  Long-term care with swallowing exercises and dental care is going to be critical for him. He is having repeat imaging in 2019 with Dr Romo. I will see him back in about a year.    Thank you very much for the opportunity to participate in the care of your patient.      Yazmin Bundy M.D.  Otolaryngology- Head & Neck Surgery      CC:  Janes Romo   of Medicine  Division of Hematology, Oncology, and Transplantation    Valery Carreon MD  Department of Radiation Oncology  [facility]

## 2019-05-28 ENCOUNTER — ANCILLARY PROCEDURE (OUTPATIENT)
Dept: CT IMAGING | Facility: CLINIC | Age: 59
End: 2019-05-28
Attending: INTERNAL MEDICINE

## 2019-05-28 ENCOUNTER — ONCOLOGY VISIT (OUTPATIENT)
Dept: ONCOLOGY | Facility: CLINIC | Age: 59
End: 2019-05-28
Attending: INTERNAL MEDICINE
Payer: COMMERCIAL

## 2019-05-28 VITALS
WEIGHT: 161.5 LBS | RESPIRATION RATE: 16 BRPM | DIASTOLIC BLOOD PRESSURE: 86 MMHG | TEMPERATURE: 98.6 F | SYSTOLIC BLOOD PRESSURE: 139 MMHG | HEART RATE: 72 BPM | BODY MASS INDEX: 25.96 KG/M2 | OXYGEN SATURATION: 96 % | HEIGHT: 66 IN

## 2019-05-28 DIAGNOSIS — C09.0 MALIGNANT NEOPLASM OF TONSILLAR FOSSA (H): Primary | ICD-10-CM

## 2019-05-28 DIAGNOSIS — C09.0 MALIGNANT NEOPLASM OF TONSILLAR FOSSA (H): ICD-10-CM

## 2019-05-28 LAB
ALBUMIN SERPL-MCNC: 3.7 G/DL (ref 3.4–5)
ALP SERPL-CCNC: 105 U/L (ref 40–150)
ALT SERPL W P-5'-P-CCNC: 21 U/L (ref 0–70)
ANION GAP SERPL CALCULATED.3IONS-SCNC: 6 MMOL/L (ref 3–14)
AST SERPL W P-5'-P-CCNC: 16 U/L (ref 0–45)
BASOPHILS # BLD AUTO: 0 10E9/L (ref 0–0.2)
BASOPHILS NFR BLD AUTO: 0.3 %
BILIRUB SERPL-MCNC: 0.6 MG/DL (ref 0.2–1.3)
BUN SERPL-MCNC: 18 MG/DL (ref 7–30)
CALCIUM SERPL-MCNC: 8.8 MG/DL (ref 8.5–10.1)
CHLORIDE SERPL-SCNC: 105 MMOL/L (ref 94–109)
CO2 SERPL-SCNC: 28 MMOL/L (ref 20–32)
CREAT SERPL-MCNC: 1 MG/DL (ref 0.66–1.25)
DIFFERENTIAL METHOD BLD: NORMAL
EOSINOPHIL # BLD AUTO: 0.2 10E9/L (ref 0–0.7)
EOSINOPHIL NFR BLD AUTO: 2.2 %
ERYTHROCYTE [DISTWIDTH] IN BLOOD BY AUTOMATED COUNT: 12.6 % (ref 10–15)
GFR SERPL CREATININE-BSD FRML MDRD: 82 ML/MIN/{1.73_M2}
GLUCOSE SERPL-MCNC: 114 MG/DL (ref 70–99)
HCT VFR BLD AUTO: 41.8 % (ref 40–53)
HGB BLD-MCNC: 14.3 G/DL (ref 13.3–17.7)
IMM GRANULOCYTES # BLD: 0 10E9/L (ref 0–0.4)
IMM GRANULOCYTES NFR BLD: 0.3 %
LYMPHOCYTES # BLD AUTO: 1.1 10E9/L (ref 0.8–5.3)
LYMPHOCYTES NFR BLD AUTO: 14.2 %
MCH RBC QN AUTO: 32.1 PG (ref 26.5–33)
MCHC RBC AUTO-ENTMCNC: 34.2 G/DL (ref 31.5–36.5)
MCV RBC AUTO: 94 FL (ref 78–100)
MONOCYTES # BLD AUTO: 0.7 10E9/L (ref 0–1.3)
MONOCYTES NFR BLD AUTO: 9.4 %
NEUTROPHILS # BLD AUTO: 5.6 10E9/L (ref 1.6–8.3)
NEUTROPHILS NFR BLD AUTO: 73.6 %
NRBC # BLD AUTO: 0 10*3/UL
NRBC BLD AUTO-RTO: 0 /100
PLATELET # BLD AUTO: 168 10E9/L (ref 150–450)
POTASSIUM SERPL-SCNC: 3.8 MMOL/L (ref 3.4–5.3)
PROT SERPL-MCNC: 6.7 G/DL (ref 6.8–8.8)
RBC # BLD AUTO: 4.46 10E12/L (ref 4.4–5.9)
SODIUM SERPL-SCNC: 139 MMOL/L (ref 133–144)
TSH SERPL DL<=0.005 MIU/L-ACNC: 2.95 MU/L (ref 0.4–4)
WBC # BLD AUTO: 7.7 10E9/L (ref 4–11)

## 2019-05-28 PROCEDURE — 80053 COMPREHEN METABOLIC PANEL: CPT | Performed by: INTERNAL MEDICINE

## 2019-05-28 PROCEDURE — G0463 HOSPITAL OUTPT CLINIC VISIT: HCPCS | Mod: ZF

## 2019-05-28 PROCEDURE — 85025 COMPLETE CBC W/AUTO DIFF WBC: CPT | Performed by: INTERNAL MEDICINE

## 2019-05-28 PROCEDURE — 84443 ASSAY THYROID STIM HORMONE: CPT | Performed by: INTERNAL MEDICINE

## 2019-05-28 PROCEDURE — 36415 COLL VENOUS BLD VENIPUNCTURE: CPT | Performed by: INTERNAL MEDICINE

## 2019-05-28 PROCEDURE — 99214 OFFICE O/P EST MOD 30 MIN: CPT | Mod: ZP | Performed by: INTERNAL MEDICINE

## 2019-05-28 RX ORDER — IOPAMIDOL 755 MG/ML
78 INJECTION, SOLUTION INTRAVASCULAR ONCE
Status: COMPLETED | OUTPATIENT
Start: 2019-05-28 | End: 2019-05-28

## 2019-05-28 RX ADMIN — IOPAMIDOL 78 ML: 755 INJECTION, SOLUTION INTRAVASCULAR at 14:07

## 2019-05-28 ASSESSMENT — PAIN SCALES - GENERAL: PAINLEVEL: NO PAIN (0)

## 2019-05-28 ASSESSMENT — MIFFLIN-ST. JEOR: SCORE: 1490.06

## 2019-05-28 NOTE — DISCHARGE INSTRUCTIONS

## 2019-05-28 NOTE — DISCHARGE INSTRUCTIONS

## 2019-05-28 NOTE — LETTER
5/28/2019       RE: Jose Wise  [address]  [place] [address]     Dear Colleague,    Thank you for referring your patient, Jose Wise, to the [facility]. Please see a copy of my visit note below.    REASON FOR VISIT:    CANCER STAGE: Cancer Staging  MALIG NEOPLASM TONSILLAR FOSSA  Staging form: Pharynx - Oropharynx, AJCC 7th Edition  - Clinical: Stage KWADWO (T2, N2b, M0) - Signed by Janes Romo DO on 11/8/2016        HISTORY OF PRESENT ILLNESS:    - 10/2013 lump in his R neck.  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 There was no LN element so it could be a soft tissue mass.  - 1/9/14 He was referred to Dr. Jayashree Alvarez at [facility] who perfromed a laryngoscopy on  revealed enlarged R tonsil.    - 1/15/14 Direct laryngoscopy and a biopsy of the R tonsil was identified squamous cell carcinoma.  P16+.     - 1/9/14 PET/CT showed a hypermetabolic tonsillar mass and 2 hypermetabolic LNs on the R side in level 2, measured 2.1cm.  No evidence of distant mets, but multiple tiny pulmonary nodules were seen, felt unlikely to be mets.     -1/17/14 he consulted radiation oncology and Dr. Romo in medical oncology.     -1/27/14 started on chemorads with HD cisplatin. He developed ototoxicity and was switched to carboplatin (AUC 6) for day 22.  He did not receive carboplatin day 43 due to thrombocytopenia.    Radiation therapy completed on 3/14/14 (7000cGy).    - His PET/CT after treatment showed a complete response    Jose returns in follow up. He is feeling very well. He has minimal complaints today. He continues to work on his swallowing and does still have some difficulty with this. He is able to eat a regular diet, but it does take some time and occasionally has difficulty swallowing some things.  He is not having any troubles with aspiration symptoms or anything like this. His dry mouth is manageable. He otherwise is without complaints.     Review Of  Systems  10-point review of systems were negative except as noted in HPI.        EXAM:  There were no vitals taken for this visit.  GEN: alert and oriented x 3, nad  HEENT: perrla, eomi, sclera anicteric, oral mucosa moist without thrush  NECK: supple, no palpable LAD  HT: reg rate and rhythm, no murmurs  LUNGS: clear to auscultation bilaterally  ABD: soft, nt, nd, +bs x 4  EXT: no clubbing, cyanosis, or edema  NEURO: CN 2-12 intact, MS 5/5 b/l    Current Outpatient Medications   Medication Sig Dispense Refill     albuterol (VENTOLIN HFA) 108 (90 BASE) MCG/ACT inhaler Inhale 2 puffs into the lungs 4 times daily as needed 1 Inhaler 11     calcium carbonate (OS-KAVYA 500 MG [place]. CA) 500 MG tablet Take by mouth 2 times daily       doxycycline (VIBRAMYCIN) 100 MG capsule TAKE 1 CAPSULE BY MOUTH TWICE A DAY  2     Glucosamine-Chondroitin (GLUCOSAMINE CHONDR COMPLEX PO) Take 1,000 mg by mouth       loratadine (CLARITIN) 10 MG tablet Take 1 tablet (10 mg) by mouth daily 30 tablet 11     metroNIDAZOLE (METROCREAM) 0.75 % cream Apply topically 2 times daily as needed 45 g 11     mometasone (ASMANEX 30 METERED DOSES) 220 MCG/INH Inhaler Inhale 1 puff into the lungs daily 3 Inhaler 3     Multiple Vitamin (MULTI-VITAMIN PO) Take  by mouth. Occasionally         Omega-3 Fatty Acids (FISH OIL) 1200 MG CAPS        OMEGA-3 FATTY ACIDS-VITAMIN E PO Take 1,000 capsules by mouth       omeprazole (PRILOSEC) 20 MG CR capsule TAKE 1 CAPSULE BY MOUTH EVERY DAY 90 capsule 3     ranitidine (ZANTAC) 300 MG tablet        UNABLE TO FIND              Recent Labs   Lab Test 11/13/18  1318   WBC 4.3   HGB 13.4        @labrcent[na,potassium,chloride,co2,bun,cr@  Recent Labs   Lab Test 11/13/18  1318   PROTTOTAL 6.6*   ALBUMIN 3.7   BILITOTAL 0.6   AST 28   ALT 29   ALKPHOS 96     No results found for this or any previous visit (from the past 744 hour(s)).  Assessment/Plan  Stage KWADWO HPV+ oropharynx cancer - He is now 5 years out from  completion of therapy.  He is likely cured of his cancer.  His CT chest today has a new 4mm R lung nodule.  He has several other nodules that have been stable for some time.  While he is likely cured from this, we will follow up on the 4mm nodule with a repeat CT scan in 6 months. I truly do not believe this represents recurrence, but it is important to follow up on this.      TSH - has been good.  I discussed with him that he should continue to have this checked once a year with his yearly physicals.     Dental Care - We reviewed the importance of receiving regular dental care and regular cleanings.  Pt was aware.      Dysphagia - He will continue to work on swallowing exercises.  I did discuss with him if this continues to be a problem, we could consider doing an EGD at some point to evaluate if he would benefit from an esophageal dilation.     I spent 35 minutes with the patient.  >50% of the time was spent in counseling and coordination of care.    Janes Romo   of Medicine  Division of Hematology, Oncology, and Transplantation

## 2019-05-28 NOTE — PROGRESS NOTES
REASON FOR VISIT:    CANCER STAGE: Cancer Staging  MALIG NEOPLASM TONSILLAR FOSSA  Staging form: Pharynx - Oropharynx, AJCC 7th Edition  - Clinical: Stage KWADWO (T2, N2b, M0) - Signed by Janes Romo DO on 11/8/2016        HISTORY OF PRESENT ILLNESS:    - 10/2013 lump in his R neck.  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 There was no LN element so it could be a soft tissue mass.  - 1/9/14 He was referred to Dr. Jayashree Alvarez at [facility] who perfromed a laryngoscopy on  revealed enlarged R tonsil.    - 1/15/14 Direct laryngoscopy and a biopsy of the R tonsil was identified squamous cell carcinoma.  P16+.     - 1/9/14 PET/CT showed a hypermetabolic tonsillar mass and 2 hypermetabolic LNs on the R side in level 2, measured 2.1cm.  No evidence of distant mets, but multiple tiny pulmonary nodules were seen, felt unlikely to be mets.     -1/17/14 he consulted radiation oncology and Dr. Romo in medical oncology.     -1/27/14 started on chemorads with HD cisplatin. He developed ototoxicity and was switched to carboplatin (AUC 6) for day 22.  He did not receive carboplatin day 43 due to thrombocytopenia.    Radiation therapy completed on 3/14/14 (7000cGy).    - His PET/CT after treatment showed a complete response    Jose returns in follow up. He is feeling very well. He has minimal complaints today. He continues to work on his swallowing and does still have some difficulty with this. He is able to eat a regular diet, but it does take some time and occasionally has difficulty swallowing some things.  He is not having any troubles with aspiration symptoms or anything like this. His dry mouth is manageable. He otherwise is without complaints.     Review Of Systems  10-point review of systems were negative except as noted in HPI.        EXAM:  There were no vitals taken for this visit.  GEN: alert and oriented x 3, nad  HEENT: perrla, eomi, sclera anicteric, oral mucosa moist without thrush  NECK:  supple, no palpable LAD  HT: reg rate and rhythm, no murmurs  LUNGS: clear to auscultation bilaterally  ABD: soft, nt, nd, +bs x 4  EXT: no clubbing, cyanosis, or edema  NEURO: CN 2-12 intact, MS 5/5 b/l    Current Outpatient Medications   Medication Sig Dispense Refill     albuterol (VENTOLIN HFA) 108 (90 BASE) MCG/ACT inhaler Inhale 2 puffs into the lungs 4 times daily as needed 1 Inhaler 11     calcium carbonate (OS-KAVYA 500 MG [place]. CA) 500 MG tablet Take by mouth 2 times daily       doxycycline (VIBRAMYCIN) 100 MG capsule TAKE 1 CAPSULE BY MOUTH TWICE A DAY  2     Glucosamine-Chondroitin (GLUCOSAMINE CHONDR COMPLEX PO) Take 1,000 mg by mouth       loratadine (CLARITIN) 10 MG tablet Take 1 tablet (10 mg) by mouth daily 30 tablet 11     metroNIDAZOLE (METROCREAM) 0.75 % cream Apply topically 2 times daily as needed 45 g 11     mometasone (ASMANEX 30 METERED DOSES) 220 MCG/INH Inhaler Inhale 1 puff into the lungs daily 3 Inhaler 3     Multiple Vitamin (MULTI-VITAMIN PO) Take  by mouth. Occasionally         Omega-3 Fatty Acids (FISH OIL) 1200 MG CAPS        OMEGA-3 FATTY ACIDS-VITAMIN E PO Take 1,000 capsules by mouth       omeprazole (PRILOSEC) 20 MG CR capsule TAKE 1 CAPSULE BY MOUTH EVERY DAY 90 capsule 3     ranitidine (ZANTAC) 300 MG tablet        UNABLE TO FIND              Recent Labs   Lab Test 11/13/18  1318   WBC 4.3   HGB 13.4        @labrcent[na,potassium,chloride,co2,bun,cr@  Recent Labs   Lab Test 11/13/18  1318   PROTTOTAL 6.6*   ALBUMIN 3.7   BILITOTAL 0.6   AST 28   ALT 29   ALKPHOS 96         No results found for this or any previous visit (from the past 744 hour(s)).        Assessment/Plan  Stage KWADWO HPV+ oropharynx cancer - He is now 5 years out from completion of therapy.  He is likely cured of his cancer.  His CT chest today has a new 4mm R lung nodule.  He has several other nodules that have been stable for some time.  While he is likely cured from this, we will follow up on the 4mm  nodule with a repeat CT scan in 6 months. I truly do not believe this represents recurrence, but it is important to follow up on this.      TSH - has been good.  I discussed with him that he should continue to have this checked once a year with his yearly physicals.     Dental Care - We reviewed the importance of receiving regular dental care and regular cleanings.  Pt was aware.      Dysphagia - He will continue to work on swallowing exercises.  I did discuss with him if this continues to be a problem, we could consider doing an EGD at some point to evaluate if he would benefit from an esophageal dilation.     I spent 35 minutes with the patient.  >50% of the time was spent in counseling and coordination of care.    Janes Romo   of Medicine  Division of Hematology, Oncology, and Transplantation

## 2019-05-28 NOTE — NURSING NOTE
"Oncology Rooming Note    May 28, 2019 5:47 PM   Jose Wise is a 58 year old male who presents for:    Chief Complaint   Patient presents with     RECHECK     onc tonsil cancer 6 month f/up      Initial Vitals: /86 (BP Location: Left arm, Patient Position: Sitting, Cuff Size: Adult Regular)   Pulse 72   Temp 98.6  F (37  C) (Oral)   Resp 16   Ht 1.676 m (5' 5.98\")   Wt 73.3 kg (161 lb 8 oz)   SpO2 96%   BMI 26.08 kg/m   Estimated body mass index is 26.08 kg/m  as calculated from the following:    Height as of this encounter: 1.676 m (5' 5.98\").    Weight as of this encounter: 73.3 kg (161 lb 8 oz). Body surface area is 1.85 meters squared.  No Pain (0) Comment: Data Unavailable   No LMP for male patient.  Allergies reviewed: Yes  Medications reviewed: Yes    Medications: Medication refills not needed today.  Pharmacy name entered into EPIC:    [facility]  [id number] - [place] - [address]  [facility] [place] [facility] [address] [facility] - [place] - [address]  [facility] - [place], [place] - [address]  [id number]  [place] - [address]    Clinical concerns: none        Rachael Jonathon, CMA              "

## 2019-08-13 ENCOUNTER — TELEPHONE (OUTPATIENT)
Dept: FAMILY MEDICINE | Facility: CLINIC | Age: 59
End: 2019-08-13

## 2019-08-13 DIAGNOSIS — Z12.5 SCREENING FOR PROSTATE CANCER: ICD-10-CM

## 2019-08-13 DIAGNOSIS — E78.5 HYPERLIPIDEMIA LDL GOAL <100: Primary | ICD-10-CM

## 2019-08-13 DIAGNOSIS — R73.01 IMPAIRED FASTING GLUCOSE: ICD-10-CM

## 2019-08-13 DIAGNOSIS — R53.83 FATIGUE, UNSPECIFIED TYPE: ICD-10-CM

## 2019-08-13 NOTE — TELEPHONE ENCOUNTER
Routing to PCP to review and advise.    Patient requesting lab orders.      Adelaide Arnold RN  [facility]

## 2019-08-13 NOTE — TELEPHONE ENCOUNTER
Reason for Call:  Other     Detailed comments: Patient would like lab orders placed prior to physical.    Phone Number Patient can be reached at: Home number on file [phone] (home)    Best Time:     Can we leave a detailed message on this number? YES    Call taken on 8/13/2019 at 2:57 PM by Megha Eastman

## 2019-09-25 DIAGNOSIS — R53.83 FATIGUE, UNSPECIFIED TYPE: ICD-10-CM

## 2019-09-25 DIAGNOSIS — E78.5 HYPERLIPIDEMIA LDL GOAL <100: ICD-10-CM

## 2019-09-25 DIAGNOSIS — Z12.5 SCREENING FOR PROSTATE CANCER: ICD-10-CM

## 2019-09-25 DIAGNOSIS — R73.01 IMPAIRED FASTING GLUCOSE: ICD-10-CM

## 2019-09-25 LAB
ALBUMIN SERPL-MCNC: 3.9 G/DL (ref 3.4–5)
ALP SERPL-CCNC: 88 U/L (ref 40–150)
ALT SERPL W P-5'-P-CCNC: 18 U/L (ref 0–70)
ANION GAP SERPL CALCULATED.3IONS-SCNC: 6 MMOL/L (ref 3–14)
AST SERPL W P-5'-P-CCNC: 15 U/L (ref 0–45)
BASOPHILS # BLD AUTO: 0 10E9/L (ref 0–0.2)
BASOPHILS NFR BLD AUTO: 0.6 %
BILIRUB SERPL-MCNC: 0.8 MG/DL (ref 0.2–1.3)
BUN SERPL-MCNC: 14 MG/DL (ref 7–30)
CALCIUM SERPL-MCNC: 9.1 MG/DL (ref 8.5–10.1)
CHLORIDE SERPL-SCNC: 106 MMOL/L (ref 94–109)
CHOLEST SERPL-MCNC: 198 MG/DL
CO2 SERPL-SCNC: 28 MMOL/L (ref 20–32)
CREAT SERPL-MCNC: 1.04 MG/DL (ref 0.66–1.25)
DIFFERENTIAL METHOD BLD: NORMAL
EOSINOPHIL # BLD AUTO: 0.1 10E9/L (ref 0–0.7)
EOSINOPHIL NFR BLD AUTO: 2.8 %
ERYTHROCYTE [DISTWIDTH] IN BLOOD BY AUTOMATED COUNT: 13.3 % (ref 10–15)
GFR SERPL CREATININE-BSD FRML MDRD: 78 ML/MIN/{1.73_M2}
GLUCOSE SERPL-MCNC: 102 MG/DL (ref 70–99)
HBA1C MFR BLD: 5.5 % (ref 0–5.6)
HCT VFR BLD AUTO: 45.1 % (ref 40–53)
HDLC SERPL-MCNC: 52 MG/DL
HGB BLD-MCNC: 15.6 G/DL (ref 13.3–17.7)
LDLC SERPL CALC-MCNC: 133 MG/DL
LYMPHOCYTES # BLD AUTO: 1.4 10E9/L (ref 0.8–5.3)
LYMPHOCYTES NFR BLD AUTO: 28.7 %
MCH RBC QN AUTO: 31.6 PG (ref 26.5–33)
MCHC RBC AUTO-ENTMCNC: 34.6 G/DL (ref 31.5–36.5)
MCV RBC AUTO: 92 FL (ref 78–100)
MONOCYTES # BLD AUTO: 0.7 10E9/L (ref 0–1.3)
MONOCYTES NFR BLD AUTO: 13 %
NEUTROPHILS # BLD AUTO: 2.7 10E9/L (ref 1.6–8.3)
NEUTROPHILS NFR BLD AUTO: 54.9 %
NONHDLC SERPL-MCNC: 146 MG/DL
PLATELET # BLD AUTO: 188 10E9/L (ref 150–450)
POTASSIUM SERPL-SCNC: 4.4 MMOL/L (ref 3.4–5.3)
PROT SERPL-MCNC: 6.8 G/DL (ref 6.8–8.8)
PSA SERPL-ACNC: 0.55 UG/L (ref 0–4)
RBC # BLD AUTO: 4.93 10E12/L (ref 4.4–5.9)
SODIUM SERPL-SCNC: 140 MMOL/L (ref 133–144)
TRIGL SERPL-MCNC: 65 MG/DL
TSH SERPL DL<=0.005 MIU/L-ACNC: 3.82 MU/L (ref 0.4–4)
WBC # BLD AUTO: 5 10E9/L (ref 4–11)

## 2019-09-25 PROCEDURE — 80053 COMPREHEN METABOLIC PANEL: CPT | Performed by: FAMILY MEDICINE

## 2019-09-25 PROCEDURE — 83036 HEMOGLOBIN GLYCOSYLATED A1C: CPT | Performed by: FAMILY MEDICINE

## 2019-09-25 PROCEDURE — G0103 PSA SCREENING: HCPCS | Performed by: FAMILY MEDICINE

## 2019-09-25 PROCEDURE — 85025 COMPLETE CBC W/AUTO DIFF WBC: CPT | Performed by: FAMILY MEDICINE

## 2019-09-25 PROCEDURE — 84443 ASSAY THYROID STIM HORMONE: CPT | Performed by: FAMILY MEDICINE

## 2019-09-25 PROCEDURE — 80061 LIPID PANEL: CPT | Performed by: FAMILY MEDICINE

## 2019-09-25 PROCEDURE — 36415 COLL VENOUS BLD VENIPUNCTURE: CPT | Performed by: FAMILY MEDICINE

## 2019-10-28 ENCOUNTER — OFFICE VISIT (OUTPATIENT)
Dept: FAMILY MEDICINE | Facility: CLINIC | Age: 59
End: 2019-10-28
Payer: COMMERCIAL

## 2019-10-28 VITALS
HEART RATE: 68 BPM | WEIGHT: 155 LBS | BODY MASS INDEX: 25.03 KG/M2 | DIASTOLIC BLOOD PRESSURE: 81 MMHG | SYSTOLIC BLOOD PRESSURE: 127 MMHG | TEMPERATURE: 97.5 F

## 2019-10-28 DIAGNOSIS — K21.9 GASTROESOPHAGEAL REFLUX DISEASE, ESOPHAGITIS PRESENCE NOT SPECIFIED: ICD-10-CM

## 2019-10-28 DIAGNOSIS — J45.20 INTERMITTENT ASTHMA, UNCOMPLICATED: ICD-10-CM

## 2019-10-28 DIAGNOSIS — Z91.89 PNEUMOCOCCAL VACCINATION INDICATED: ICD-10-CM

## 2019-10-28 DIAGNOSIS — Z23 NEED FOR PROPHYLACTIC VACCINATION AND INOCULATION AGAINST INFLUENZA: ICD-10-CM

## 2019-10-28 DIAGNOSIS — Z00.00 ENCOUNTER FOR PREVENTATIVE ADULT HEALTH CARE EXAMINATION: Primary | ICD-10-CM

## 2019-10-28 DIAGNOSIS — N50.819 TESTICLE PAIN: ICD-10-CM

## 2019-10-28 LAB
ALBUMIN UR-MCNC: NEGATIVE MG/DL
APPEARANCE UR: CLEAR
BACTERIA #/AREA URNS HPF: ABNORMAL /HPF
BILIRUB UR QL STRIP: NEGATIVE
COLOR UR AUTO: YELLOW
GLUCOSE UR STRIP-MCNC: NEGATIVE MG/DL
HGB UR QL STRIP: ABNORMAL
KETONES UR STRIP-MCNC: NEGATIVE MG/DL
LEUKOCYTE ESTERASE UR QL STRIP: NEGATIVE
NITRATE UR QL: NEGATIVE
PH UR STRIP: 6 PH (ref 5–7)
RBC #/AREA URNS AUTO: ABNORMAL /HPF
SOURCE: ABNORMAL
SP GR UR STRIP: 1.01 (ref 1–1.03)
UROBILINOGEN UR STRIP-ACNC: 0.2 EU/DL (ref 0.2–1)
WBC #/AREA URNS AUTO: ABNORMAL /HPF

## 2019-10-28 PROCEDURE — 90471 IMMUNIZATION ADMIN: CPT | Performed by: FAMILY MEDICINE

## 2019-10-28 PROCEDURE — 90682 RIV4 VACC RECOMBINANT DNA IM: CPT | Performed by: FAMILY MEDICINE

## 2019-10-28 PROCEDURE — 99396 PREV VISIT EST AGE 40-64: CPT | Mod: 25 | Performed by: FAMILY MEDICINE

## 2019-10-28 PROCEDURE — 90670 PCV13 VACCINE IM: CPT | Performed by: FAMILY MEDICINE

## 2019-10-28 PROCEDURE — 99213 OFFICE O/P EST LOW 20 MIN: CPT | Mod: 25 | Performed by: FAMILY MEDICINE

## 2019-10-28 PROCEDURE — 90472 IMMUNIZATION ADMIN EACH ADD: CPT | Performed by: FAMILY MEDICINE

## 2019-10-28 PROCEDURE — 81001 URINALYSIS AUTO W/SCOPE: CPT | Performed by: FAMILY MEDICINE

## 2019-10-28 RX ORDER — ALBUTEROL SULFATE 90 UG/1
2 AEROSOL, METERED RESPIRATORY (INHALATION) 4 TIMES DAILY PRN
Qty: 1 INHALER | Refills: 11 | Status: SHIPPED | OUTPATIENT
Start: 2019-10-28 | End: 2021-09-27

## 2019-10-28 ASSESSMENT — ENCOUNTER SYMPTOMS
HEADACHES: 1
SORE THROAT: 0
PARESTHESIAS: 0
SHORTNESS OF BREATH: 0
WEAKNESS: 0
ABDOMINAL PAIN: 0
MYALGIAS: 0
HEARTBURN: 0
FEVER: 0
NAUSEA: 0
ARTHRALGIAS: 0
COUGH: 0
JOINT SWELLING: 0
HEMATOCHEZIA: 0
EYE PAIN: 0
PALPITATIONS: 0
FREQUENCY: 0
CHILLS: 0
DIZZINESS: 0
NERVOUS/ANXIOUS: 0
DYSURIA: 0
HEMATURIA: 0
DIARRHEA: 0
CONSTIPATION: 0

## 2019-10-28 ASSESSMENT — PAIN SCALES - GENERAL: PAINLEVEL: NO PAIN (0)

## 2019-10-28 NOTE — LETTER
My Asthma Action Plan    Name: Jose Wise   YOB: 1960  Date: 10/28/2019   My doctor: Nicolás Morejon MD   My clinic: [facility]        My Rescue Medicine:   Albuterol inhaler (Proair/Ventolin/Proventil HFA)  2-4 puffs EVERY 4 HOURS as needed. Use a spacer if recommended by your provider.   My Asthma Severity:   Intermittent / Exercise Induced  Know your asthma triggers: see list              GREEN ZONE   Good Control    I feel good    No cough or wheeze    Can work, sleep and play without asthma symptoms       Take your asthma control medicine every day.     1. If exercise triggers your asthma, take your rescue medication    15 minutes before exercise or sports, and    During exercise if you have asthma symptoms  2. Spacer to use with inhaler: If you have a spacer, make sure to use it with your inhaler             YELLOW ZONE Getting Worse  I have ANY of these:    I do not feel good    Cough or wheeze    Chest feels tight    Wake up at night   1. Keep taking your Green Zone medications  2. Start taking your rescue medicine:    every 20 minutes for up to 1 hour. Then every 4 hours for 24-48 hours.  3. If you stay in the Yellow Zone for more than 12-24 hours, contact your doctor.  4. If you do not return to the Green Zone in 12-24 hours or you get worse, start taking your oral steroid medicine if prescribed by your provider.           RED ZONE Medical Alert - Get Help  I have ANY of these:    I feel awful    Medicine is not helping    Breathing getting harder    Trouble walking or talking    Nose opens wide to breathe       1. Take your rescue medicine NOW  2. If your provider has prescribed an oral steroid medicine, start taking it NOW  3. Call your doctor NOW  4. If you are still in the Red Zone after 20 minutes and you have not reached your doctor:    Take your rescue medicine again and    Call 911 or go to the emergency room right away    See your regular doctor  within 2 weeks of an Emergency Room or Urgent Care visit for follow-up treatment.          Annual Reminders:  Meet with Asthma Educator,  Flu Shot in the Fall, consider Pneumonia Vaccination for patients with asthma (aged 19 and older).    Pharmacy:    [facility]  [id number] [place], [place] - [address] [facility]  [facility], [place] - [address] [facility] - [place], [place] [address] [facility] [place], [place] - [address] [id number] - [place], [place] - [address]                        Asthma Triggers  How To Control Things That Make Your Asthma Worse    Triggers are things that make your asthma worse.  Look at the list below to help you find your triggers and   what you can do about them. You can help prevent asthma flare-ups by staying away from your triggers.      Trigger                                                          What you can do   Cigarette Smoke  Tobacco smoke can make asthma worse. Do not allow smoking in your home, car or around you.  Be sure no one smokes at a child s day care or school.  If you smoke, ask your health care provider for ways to help you quit.  Ask family members to quit too.  Ask your health care provider for a referral to Quit Plan to help you quit smoking, or call 6-077-181-PLAN.     Colds, Flu, Bronchitis  These are common triggers of asthma. Wash your hands often.  Don t touch your eyes, nose or mouth.  Get a flu shot every year.     Dust Mites  These are tiny bugs that live in cloth or carpet. They are too small to see. Wash sheets and blankets in hot water every week.   Encase pillows and mattress in dust mite proof covers.  Avoid having carpet if you can. If you have carpet, vacuum weekly.   Use a dust mask and HEPA vacuum.   Pollen and Outdoor Mold  Some people are allergic to trees, grass, or  weed pollen, or molds. Try to keep your windows closed.  Limit time out doors when pollen count is high.   Ask you health care provider about taking medicine during allergy season.     Animal Dander  Some people are allergic to skin flakes, urine or saliva from pets with fur or feathers. Keep pets with fur or feathers out of your home.    If you can t keep the pet outdoors, then keep the pet out of your bedroom.  Keep the bedroom door closed.  Keep pets off cloth furniture and away from stuffed toys.     Mice, Rats, and Cockroaches  Some people are allergic to the waste from these pests.   Cover food and garbage.  Clean up spills and food crumbs.  Store grease in the refrigerator.   Keep food out of the bedroom.   Indoor Mold  This can be a trigger if your home has high moisture. Fix leaking faucets, pipes, or other sources of water.   Clean moldy surfaces.  Dehumidify basement if it is damp and smelly.   Smoke, Strong Odors, and Sprays  These can reduce air quality. Stay away from strong odors and sprays, such as perfume, powder, hair spray, paints, smoke incense, paint, cleaning products, candles and new carpet.   Exercise or Sports  Some people with asthma have this trigger. Be active!  Ask your doctor about taking medicine before sports or exercise to prevent symptoms.    Warm up for 5-10 minutes before and after sports or exercise.     Other Triggers of Asthma  Cold air:  Cover your nose and mouth with a scarf.  Sometimes laughing or crying can be a trigger.  Some medicines and food can trigger asthma.

## 2019-10-28 NOTE — PROGRESS NOTES
SUBJECTIVE:   CC: Jose Wise is an 59 year old male who presents for preventative health visit.     Healthy Habits:     Getting at least 3 servings of Calcium per day:  Yes    Bi-annual eye exam:  Yes    Dental care twice a year:  Yes    Sleep apnea or symptoms of sleep apnea:  None    Diet:  Regular (no restrictions)    Frequency of exercise:  None    Taking medications regularly:  Yes    Medication side effects:  None    PHQ-2 Total Score: 0    Additional concerns today:  Yes          none    Today's PHQ-2 Score:   PHQ-2 (  [employer]) 10/28/2019   Q1: Little interest or pleasure in doing things 0   Q2: Feeling down, depressed or hopeless 0   PHQ-2 Score 0   Q1: Little interest or pleasure in doing things Not at all   Q2: Feeling down, depressed or hopeless Not at all   PHQ-2 Score 0       Abuse: Current or Past(Physical, Sexual or Emotional)- No  Do you feel safe in your environment? 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 Not applicable    Alcohol Use 10/28/2019   Prescreen: >3 drinks/day or >7 drinks/week? Not Applicable   Prescreen: >3 drinks/day or >7 drinks/week? -   No flowsheet data found.    Last PSA:   PSA   Date Value Ref Range Status   2019 0.55 0 - 4 ug/L Final     Comment:     Assay Method:  Chemiluminescence using Siemens Vista analyzer       Reviewed orders with patient. Reviewed health maintenance and updated orders accordingly - Yes       Reviewed and updated as needed this visit by clinical staff  Tobacco  Allergies  Meds  Med Hx   Surg Hx  Fam Hx  Soc Hx        Reviewed and updated as needed this visit by Provider            Review of Systems   Constitutional: Negative for chills and fever.   HENT: Negative for congestion, ear pain, hearing loss and sore throat.    Eyes: Negative for pain and visual disturbance.   Respiratory: Negative for cough and shortness of breath.    Cardiovascular: Negative for chest pain, palpitations and peripheral edema.   Gastrointestinal: Negative for abdominal pain, constipation, diarrhea, heartburn, hematochezia and nausea.   Genitourinary: Negative for discharge, dysuria, frequency, genital sores, hematuria, impotence and urgency.   Musculoskeletal: Negative for arthralgias, joint swelling and myalgias.   Skin: Negative for rash.   Neurological: Positive for headaches. Negative for dizziness, weakness and paresthesias.   Psychiatric/Behavioral: Negative for mood changes. The patient is not nervous/anxious.       pain low back left side for a few months  Hips ache some  Occasionally into testicle  Wants flu shot  Recently diagnosed with shingles, got antiviral meds  Wondering about shot  Had been taking the ranitidine  No bloody or black stools    Had bump on forehead but went away    A lot of time on knees over the years  Recently right knee hurting under skin    Active, no regular exercise    No penile discharge         OBJECTIVE:   /81 (BP Location: Left arm, Patient Position: Chair, Cuff Size: Adult Regular)   Pulse 68   Temp 97.5  F (36.4  C) (Oral)   Wt 70.3 kg (155 lb)   BMI 25.03 kg/m      Physical Exam  GENERAL: healthy, alert and no distress  EYES: Eyes grossly normal to inspection, PERRL and conjunctivae and sclerae normal  HENT: ear canals and TM's normal, nose and mouth without ulcers or lesions  NECK: no adenopathy, no asymmetry, masses, or scars and thyroid normal to palpation  RESP: lungs clear to auscultation - no rales, rhonchi or wheezes  CV: regular rate and rhythm, normal S1  S2, no S3 or S4, no murmur, click or rub, no peripheral edema and peripheral pulses strong  ABDOMEN: soft, nontender, no hepatosplenomegaly, no masses and bowel sounds normal   (male): normal male genitalia without lesions or urethral discharge, no hernia  MS: no gross musculoskeletal defects noted, no edema  SKIN: no suspicious lesions or rashes  NEURO: Normal strength and tone, mentation intact and speech normal  PSYCH: mentation appears normal, affect normal/bright  Only slight resolving shingles left torso  Able to get up on heels and toes fine  No point tenderness over back  Range of motion back okay     Diagnostic Test Results:  Labs reviewed in Epic    ASSESSMENT/PLAN:   Jose was seen today for physical, health maintenance and imm/inj.    Diagnoses and all orders for this visit:    Encounter for preventative adult health care examination    Testicle pain  -     US Testicular and Scrotum; Future  -     *UA reflex to Microscopic and Culture ([facility] (except [place] and [place])  -     Urine Microscopic    Pneumococcal vaccination indicated  -     PNEUMOCOCCAL CONJ VACCINE 13 VALENT IM  -     VACCINE ADMINISTRATION, INITIAL  -     SCREENING QUESTIONS FOR ADULT IMMUNIZATIONS    Intermittent asthma, uncomplicated  -     mometasone (ASMANEX, 30 METERED DOSES,) 220 MCG/INH inhaler; Inhale 1 puff into the lungs daily  -     albuterol (VENTOLIN HFA) 108 (90 Base) MCG/ACT inhaler; Inhale 2 puffs into the lungs 4 times daily as needed    Gastroesophageal reflux disease, esophagitis presence not specified  -     omeprazole (PRILOSEC) 20 MG DR capsule; TAKE 1 CAPSULE BY MOUTH EVERY DAY    Need for prophylactic vaccination and inoculation against influenza  -     INFLUENZA QUAD, RECOMBINANT, P-FREE (RIV4) (FLUBLOCK) [10024]  -     Vaccine Administration, Each Additional [39829]      Discussed multiple issues with patient    He needs ppi to keep symptoms under control  Given the finding of  "eosinophilia on egd pathology, prudent to try a loratadine type med on regular basis  Flu shot given  Refill needed meds  Went over recent labs in great detail  Up to date on colonoscopy   Could use h2 blocker for breakthrough symptoms   Patient to schedule scrotal ultrasound   Check ua to rule out blood              COUNSELING:   Reviewed preventive health counseling, as reflected in patient instructions       Regular exercise       Healthy diet/nutrition       Vision screening       Safe sex practices/STD prevention       Colon cancer screening       Prostate cancer screening    Estimated body mass index is 25.03 kg/m  as calculated from the following:    Height as of 5/28/19: 1.676 m (5' 5.98\").    Weight as of this encounter: 70.3 kg (155 lb).          reports that he quit smoking about 13 years ago. His smoking use included cigarettes. He started smoking about 39 years ago. He has a 12.50 pack-year smoking history. He has never used smokeless tobacco.      Counseling Resources:  ATP IV Guidelines  Pooled Cohorts Equation Calculator  FRAX Risk Assessment  ICSI Preventive Guidelines  Dietary Guidelines for Americans, 2010  USDA's MyPlate  ASA Prophylaxis  Lung CA Screening    Nicolás Morejon MD  [facility]  "

## 2019-10-28 NOTE — PATIENT INSTRUCTIONS
Reduce intake of trigger foods    Could take over the counter loratadine ( generic claritin ) on more regular    Could use the famotidine ( generic pepcid ) - similar to ranitidine ( zantac ) as needed    Stay on omeprazole    We will send you lab result    Schedule scrotal ultrasound

## 2019-10-29 ASSESSMENT — ASTHMA QUESTIONNAIRES: ACT_TOTALSCORE: 25

## 2019-10-29 NOTE — RESULT ENCOUNTER NOTE
Only trace amount of blood present.  Advise follow up in 6 months to make sure urine okay.    I do advise that you call and schedule the scrotal ultrasound to be done in the next few weeks.    Nicolás Morejon MD

## 2019-11-05 ENCOUNTER — ANCILLARY PROCEDURE (OUTPATIENT)
Dept: CT IMAGING | Facility: CLINIC | Age: 59
End: 2019-11-05
Attending: INTERNAL MEDICINE
Payer: COMMERCIAL

## 2019-11-05 ENCOUNTER — ONCOLOGY VISIT (OUTPATIENT)
Dept: ONCOLOGY | Facility: CLINIC | Age: 59
End: 2019-11-05
Attending: INTERNAL MEDICINE
Payer: COMMERCIAL

## 2019-11-05 VITALS
DIASTOLIC BLOOD PRESSURE: 92 MMHG | BODY MASS INDEX: 24.96 KG/M2 | TEMPERATURE: 97.1 F | SYSTOLIC BLOOD PRESSURE: 152 MMHG | RESPIRATION RATE: 16 BRPM | OXYGEN SATURATION: 98 % | HEIGHT: 66 IN | WEIGHT: 155.3 LBS | HEART RATE: 71 BPM

## 2019-11-05 DIAGNOSIS — C09.0 MALIGNANT NEOPLASM OF TONSILLAR FOSSA (H): Primary | ICD-10-CM

## 2019-11-05 DIAGNOSIS — C09.0 MALIGNANT NEOPLASM OF TONSILLAR FOSSA (H): ICD-10-CM

## 2019-11-05 PROCEDURE — 99214 OFFICE O/P EST MOD 30 MIN: CPT | Mod: ZP | Performed by: INTERNAL MEDICINE

## 2019-11-05 PROCEDURE — G0463 HOSPITAL OUTPT CLINIC VISIT: HCPCS | Mod: ZF

## 2019-11-05 ASSESSMENT — PAIN SCALES - GENERAL: PAINLEVEL: NO PAIN (0)

## 2019-11-05 ASSESSMENT — MIFFLIN-ST. JEOR: SCORE: 1456.94

## 2019-11-05 NOTE — NURSING NOTE
"Oncology Rooming Note    November 5, 2019 4:02 PM   Jose Wise is a 59 year old male who presents for:    Chief Complaint   Patient presents with     Oncology Clinic Visit     [facility] RETURN- TONSIL CA     Initial Vitals: BP (!) 152/92 (BP Location: Right arm, Patient Position: Chair, Cuff Size: Adult Regular)   Pulse 71   Temp 97.1  F (36.2  C) (Oral)   Resp 16   Ht 1.676 m (5' 5.98\")   Wt 70.4 kg (155 lb 4.8 oz)   SpO2 98%   BMI 25.08 kg/m   Estimated body mass index is 25.08 kg/m  as calculated from the following:    Height as of this encounter: 1.676 m (5' 5.98\").    Weight as of this encounter: 70.4 kg (155 lb 4.8 oz). Body surface area is 1.81 meters squared.  No Pain (0) Comment: Data Unavailable   No LMP for male patient.  Allergies reviewed: Yes  Medications reviewed: Yes    Medications: Medication refills not needed today.  Pharmacy name entered into EPIC:    [facility]  [id number] [place] - [address] [facility] [address]  [facility] [place], [place] - [address] [facility] - [place], [place] - [address] [facility] [place] - [place], [place] - [address] [id number]  [place] - [address] [place] [address] AT [address]    Clinical concerns: No new concerns. Demetrius was notified.      Nicholas Toledo LPN            "

## 2019-11-05 NOTE — PROGRESS NOTES
REASON FOR VISIT:     CANCER STAGE: Cancer Staging  MALIG NEOPLASM TONSILLAR FOSSA  Staging form: Pharynx - Oropharynx, AJCC 7th Edition  - Clinical: Stage KWADWO (T2, N2b, M0) - Signed by Janes Romo DO on 11/8/2016        HISTORY OF PRESENT ILLNESS:    - 10/2013 lump in his R neck.  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 There was no LN element so it could be a soft tissue mass.  - 1/9/14 He was referred to Dr. Jayashree Alvarez at [facility] who perfromed a laryngoscopy on  revealed enlarged R tonsil.    - 1/15/14 Direct laryngoscopy and a biopsy of the R tonsil was identified squamous cell carcinoma.  P16+.     - 1/9/14 PET/CT showed a hypermetabolic tonsillar mass and 2 hypermetabolic LNs on the R side in level 2, measured 2.1cm.  No evidence of distant mets, but multiple tiny pulmonary nodules were seen, felt unlikely to be mets.     -1/17/14 he consulted radiation oncology and Dr. Romo in medical oncology.     -1/27/14 started on chemorads with HD cisplatin. He developed ototoxicity and was switched to carboplatin (AUC 6) for day 22.  He did not receive carboplatin day 43 due to thrombocytopenia.    Radiation therapy completed on 3/14/14 (7000cGy).    - His PET/CT after treatment showed a complete response    Interval Hx:  Jose returns in follow up. He is feeling very well. He has minimal complaints today. He continues to work on his swallowing and does still have some difficulty with this. He is able to eat a regular diet, but it does take some time and occasionally has difficulty swallowing some things.  Weight is stabilized in the last few monhts. His dry mouth is manageable. He otherwise is without complaints.     Review Of Systems  10-point review of systems were negative except as noted in HPI.        EXAM:  Wt Readings from Last 4 Encounters:   11/05/19 70.4 kg (155 lb 4.8 oz)   10/28/19 70.3 kg (155 lb)   05/28/19 73.3 kg (161 lb 8 oz)   12/12/18 72.5 kg (159 lb 12.8 oz)       Wt  "Readings from Last 4 Encounters:   11/05/19 70.4 kg (155 lb 4.8 oz)   10/28/19 70.3 kg (155 lb)   05/28/19 73.3 kg (161 lb 8 oz)   12/12/18 72.5 kg (159 lb 12.8 oz)     Blood pressure (!) 152/92, pulse 71, temperature 97.1  F (36.2  C), temperature source Oral, resp. rate 16, height 1.676 m (5' 5.98\"), weight 70.4 kg (155 lb 4.8 oz), SpO2 98 %.  GEN: alert and oriented x 3, nad  HEENT: perrla, eomi, sclera anicteric, oral mucosa moist without thrush  NECK: supple, no palpable LAD  HT: reg rate and rhythm, no murmurs  LUNGS: clear to auscultation bilaterally  ABD: soft, nt, nd, +bs x 4  EXT: no clubbing, cyanosis, or edema  NEURO: CN 2-12 intact, MS 5/5 b/l    Current Outpatient Medications   Medication Sig Dispense Refill     albuterol (VENTOLIN HFA) 108 (90 Base) MCG/ACT inhaler Inhale 2 puffs into the lungs 4 times daily as needed 1 Inhaler 11     calcium carbonate (OS-KAVYA 500 MG [place]. CA) 500 MG tablet Take by mouth 2 times daily       Glucosamine-Chondroitin (GLUCOSAMINE CHONDR COMPLEX PO) Take 1,000 mg by mouth       metroNIDAZOLE (METROCREAM) 0.75 % cream Apply topically 2 times daily as needed 45 g 11     mometasone (ASMANEX, 30 METERED DOSES,) 220 MCG/INH inhaler Inhale 1 puff into the lungs daily 3 Inhaler 3     Multiple Vitamin (MULTI-VITAMIN PO) Take  by mouth. Occasionally         Omega-3 Fatty Acids (FISH OIL) 1200 MG CAPS        OMEGA-3 FATTY ACIDS-VITAMIN E PO Take 1,000 mg by mouth        omeprazole (PRILOSEC) 20 MG DR capsule TAKE 1 CAPSULE BY MOUTH EVERY DAY 90 capsule 3     ranitidine (ZANTAC) 300 MG tablet        UNABLE TO FIND              CT Chest 11/5/2019:  IMPRESSION:   1. There are no new or enlarging pulmonary nodules. Nodules are either  stable or decreased in size compared to prior exam. Recommend  follow-up in 12 months.   2. No evidence of metastatic disease within the chest.    Assessment/Plan  Stage KWADWO HPV+ oropharynx cancer - He is now >5 years out from completion of therapy. He is " doing well. His repeat CT chest looks good. The previously described nodules are better than before or stable. I did discharge him from the oncology clinic as he no longer needs active surveillance.  We did discuss that going forward, he should follow with his PCP regularly with TSH checks and also should continue regular dental care.   I did congratulate him on making it through the treatment and follow up.  I did invite him to come to the yearly head and neck cancer symposium hosted here for patients and families as his testimonials could be helpful.      TSH - has been good.  I discussed with him that he should continue to have this checked once a year with his yearly physicals.     Dysphagia - Improving. He will continue to work on swallowing exercises.  I did discuss with him if this continues to be a problem, we could consider doing an EGD at some point to evaluate if he would benefit from an esophageal dilation.       Janes Romo  Associate Professor of Medicine  Division of Hematology, Oncology, and Transplantation    I spent 30 minutes with the patient.  >50% of the time was spent in counseling and coordination of care.

## 2019-11-05 NOTE — LETTER
11/5/2019       RE: Jose Wise  [address]  [place] [address]     Dear Colleague,    Thank you for referring your patient, Jose Wise, to the [facility]. Please see a copy of my visit note below.    REASON FOR VISIT:     CANCER STAGE: Cancer Staging  MALIG NEOPLASM TONSILLAR FOSSA  Staging form: Pharynx - Oropharynx, AJCC 7th Edition  - Clinical: Stage KWADWO (T2, N2b, M0) - Signed by Janes Romo DO on 11/8/2016    HISTORY OF PRESENT ILLNESS:    - 10/2013 lump in his R neck.  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 There was no LN element so it could be a soft tissue mass.  - 1/9/14 He was referred to Dr. Jayashree Alvarez at [facility] who perfromed a laryngoscopy on  revealed enlarged R tonsil.    - 1/15/14 Direct laryngoscopy and a biopsy of the R tonsil was identified squamous cell carcinoma.  P16+.     - 1/9/14 PET/CT showed a hypermetabolic tonsillar mass and 2 hypermetabolic LNs on the R side in level 2, measured 2.1cm.  No evidence of distant mets, but multiple tiny pulmonary nodules were seen, felt unlikely to be mets.     -1/17/14 he consulted radiation oncology and Dr. Romo in medical oncology.     -1/27/14 started on chemorads with HD cisplatin. He developed ototoxicity and was switched to carboplatin (AUC 6) for day 22.  He did not receive carboplatin day 43 due to thrombocytopenia.    Radiation therapy completed on 3/14/14 (7000cGy).    - His PET/CT after treatment showed a complete response    Interval Hx:  Jose returns in follow up. He is feeling very well. He has minimal complaints today. He continues to work on his swallowing and does still have some difficulty with this. He is able to eat a regular diet, but it does take some time and occasionally has difficulty swallowing some things.  Weight is stabilized in the last few monhts. His dry mouth is manageable. He otherwise is without complaints.     Review Of Systems  10-point review of  "systems were negative except as noted in HPI.        EXAM:  Wt Readings from Last 4 Encounters:   11/05/19 70.4 kg (155 lb 4.8 oz)   10/28/19 70.3 kg (155 lb)   05/28/19 73.3 kg (161 lb 8 oz)   12/12/18 72.5 kg (159 lb 12.8 oz)       Wt Readings from Last 4 Encounters:   11/05/19 70.4 kg (155 lb 4.8 oz)   10/28/19 70.3 kg (155 lb)   05/28/19 73.3 kg (161 lb 8 oz)   12/12/18 72.5 kg (159 lb 12.8 oz)     Blood pressure (!) 152/92, pulse 71, temperature 97.1  F (36.2  C), temperature source Oral, resp. rate 16, height 1.676 m (5' 5.98\"), weight 70.4 kg (155 lb 4.8 oz), SpO2 98 %.  GEN: alert and oriented x 3, nad  HEENT: perrla, eomi, sclera anicteric, oral mucosa moist without thrush  NECK: supple, no palpable LAD  HT: reg rate and rhythm, no murmurs  LUNGS: clear to auscultation bilaterally  ABD: soft, nt, nd, +bs x 4  EXT: no clubbing, cyanosis, or edema  NEURO: CN 2-12 intact, MS 5/5 b/l    Current Outpatient Medications   Medication Sig Dispense Refill     albuterol (VENTOLIN HFA) 108 (90 Base) MCG/ACT inhaler Inhale 2 puffs into the lungs 4 times daily as needed 1 Inhaler 11     calcium carbonate (OS-KAVYA 500 MG [place]. [place]) 500 MG tablet Take by mouth 2 times daily       Glucosamine-Chondroitin (GLUCOSAMINE CHONDR COMPLEX PO) Take 1,000 mg by mouth       metroNIDAZOLE (METROCREAM) 0.75 % cream Apply topically 2 times daily as needed 45 g 11     mometasone (ASMANEX, 30 METERED DOSES,) 220 MCG/INH inhaler Inhale 1 puff into the lungs daily 3 Inhaler 3     Multiple Vitamin (MULTI-VITAMIN PO) Take  by mouth. Occasionally         Omega-3 Fatty Acids (FISH OIL) 1200 MG CAPS        OMEGA-3 FATTY ACIDS-VITAMIN E PO Take 1,000 mg by mouth        omeprazole (PRILOSEC) 20 MG DR capsule TAKE 1 CAPSULE BY MOUTH EVERY DAY 90 capsule 3     ranitidine (ZANTAC) 300 MG tablet        UNABLE TO FIND          CT Chest 11/5/2019:  IMPRESSION:   1. There are no new or enlarging pulmonary nodules. Nodules are either  stable or decreased in " size compared to prior exam. Recommend  follow-up in 12 months.   2. No evidence of metastatic disease within the chest.    Assessment/Plan  Stage KWADWO HPV+ oropharynx cancer - He is now >5 years out from completion of therapy. He is doing well. His repeat CT chest looks good. The previously described nodules are better than before or stable. I did discharge him from the oncology clinic as he no longer needs active surveillance.  We did discuss that going forward, he should follow with his PCP regularly with TSH checks and also should continue regular dental care.   I did congratulate him on making it through the treatment and follow up.  I did invite him to come to the yearly head and neck cancer symposium hosted here for patients and families as his testimonials could be helpful.      TSH - has been good.  I discussed with him that he should continue to have this checked once a year with his yearly physicals.     Dysphagia - Improving. He will continue to work on swallowing exercises.  I did discuss with him if this continues to be a problem, we could consider doing an EGD at some point to evaluate if he would benefit from an esophageal dilation.     Janes Romo  Associate Professor of Medicine  Division of Hematology, Oncology, and Transplantation    I spent 30 minutes with the patient.  >50% of the time was spent in counseling and coordination of care.

## 2019-11-06 ENCOUNTER — TELEPHONE (OUTPATIENT)
Dept: FAMILY MEDICINE | Facility: CLINIC | Age: 59
End: 2019-11-06

## 2019-11-06 ENCOUNTER — ANCILLARY PROCEDURE (OUTPATIENT)
Dept: ULTRASOUND IMAGING | Facility: CLINIC | Age: 59
End: 2019-11-06
Attending: FAMILY MEDICINE
Payer: COMMERCIAL

## 2019-11-06 DIAGNOSIS — N50.819 TESTICLE PAIN: ICD-10-CM

## 2019-11-06 PROCEDURE — 76870 US EXAM SCROTUM: CPT

## 2019-11-07 NOTE — RESULT ENCOUNTER NOTE
Here is the ultrasound report.  Normal.    Follow up as needed based on symptoms.    Nicolás Morejon MD

## 2019-11-29 ENCOUNTER — MYC MEDICAL ADVICE (OUTPATIENT)
Dept: FAMILY MEDICINE | Facility: CLINIC | Age: 59
End: 2019-11-29

## 2019-11-29 DIAGNOSIS — M65.319 TRIGGER FINGER OF THUMB, UNSPECIFIED LATERALITY: Primary | ICD-10-CM

## 2019-11-29 NOTE — TELEPHONE ENCOUNTER
I called and discussed in detail with patient.  He had Phuc fix trigger finger in past.  I did referral so he can see Phuc again for trigger thumb.  Patient to call and schedule.  Nicolás Morejon MD

## 2019-12-12 NOTE — PROGRESS NOTES
SUBJECTIVE:   Jose Wise is a 59 year old male who is seen in consultation at the request of Dr. Morejon for evaluation of right thumb trigger finger that has been present for a couple months. No known injury. He complains today locking in the right thumb.    Orthopedic PMH: Previous trigger finger release on the left by Dr. Friend in the past.    Review of Systems:  Constitutional:  NEGATIVE for fever, chills, change in weight  Integumentary/Skin:  NEGATIVE for worrisome rashes, moles or lesions  Eyes:  NEGATIVE for vision changes or irritation  ENT/Mouth:  NEGATIVE for ear, mouth and throat problems  Resp:  NEGATIVE for significant cough or SOB  CV:  NEGATIVE for chest pain, palpitations or peripheral edema  GI:  NEGATIVE for nausea, abdominal pain, heartburn, or change in bowel habits  :  Negative   Musculoskeletal:  See HPI above  Neuro:  NEGATIVE for weakness, dizziness or paresthesias  Endocrine:  NEGATIVE for temperature intolerance, skin/hair changes  Heme/allergy/immune:  NEGATIVE for bleeding problems  Psychiatric:  NEGATIVE for changes in mood or affect    Past Medical History:   Past Medical History:   Diagnosis Date     Asthma      Benign positional vertigo      Chemical dependency (H)      Communic dis contact NEC      Gastroesophageal reflux disease      History of radiation therapy      Hoarseness      Impacted cerumen 10/14/2013     Impaired fasting glucose 9/4/2012     Lateral epicondylitis  of elbow      Pain in joint, upper arm      Pure hypercholesterolemia      Rosacea 12/5/2012     Seasonal allergies      Spasm of muscle      Substance abuse (H)     POLYSUBSTANCE     Tension headache, chronic 10/17/2012     Tinea versicolor 12/5/2012     Tinnitus      Tonsillar cancer (H) 2013    chemo and radiation     Unspecified asthma(493.90)     takes daily inhaler, never been intubated, no attacks  since 2012     Past Surgical History:   Past Surgical History:   Procedure Laterality Date      ABDOMEN SURGERY  2014    peg and port     BIOPSY  2013    fine needle aspiration in my neck     C HAND/FINGER SURGERY UNLISTED  12/13/10    Lt middle TF relese     COLONOSCOPY  11    Repeat 10 years     HERNIA REPAIR  1960    as an infant     INSERT PORT VASCULAR ACCESS  2014    Procedure: INSERT PORT VASCULAR ACCESS;  Insertion Vascular Port Access, Percutaneous Insertion Gastrostomy Tube ;  Surgeon: Adebayo Alston MD;  Location: [facility] OR     LARYNGOSCOPY, ESOPHAGOSCOPY,  BIOPSY, COMBINED  1/15/2014    Procedure: COMBINED LARYNGOSCOPY, ESOPHAGOSCOPY,  BIOPSY;  Direct Laryngoscopy, Esophagoscopy, Biopsies ;  Surgeon: Jayashree Alvarez MD;  Location: [facility] OR     ORTHOPEDIC SURGERY      fracture repair arm, shoulder     SOFT TISSUE SURGERY      cyst removal     Family History:   Family History   Problem Relation Age of Onset     Circulatory Mother 45        cerebral aneurysm     Alcohol/Drug Mother         sibling     Other - See Comments Mother         aneurysm     Substance Abuse Mother      Cancer Mother      Cardiovascular Father         Heart Attack     Coronary Artery Disease Father         heart attack/ triple bypass     Cancer Father         skin cancer     Skin Cancer Father      Other Cancer Brother         upper thorax     Substance Abuse Brother      Coronary Artery Disease Brother         stent     Cancer Sister         cervical     Cancer Brother         throat cancer     Gastrointestinal Disease Son         crohn's disease/ colostomy; step child     Cancer Sister      Cancer Brother      Melanoma No family hx of      Social History:   Social History     Tobacco Use     Smoking status: Former Smoker     Packs/day: 0.50     Years: 25.00     Pack years: 12.50     Types: Cigarettes     Start date: 1980     Last attempt to quit: 2006     Years since quittin.9     Smokeless tobacco: Never Used     Tobacco comment: 1 pack a day   Substance Use Topics      "Alcohol use: No     Comment: Used to be a heavy drinker- sober 10 years     This document serves as a record of the services and decisions personally performed and made by JULIEN Hair MD. It was created on his behalf by Daniel Suazo, a trained medical scribe. The creation of this document is based the provider's statements to the medical scribe.    Scribe Daniel Lilly Said 9:50 AM 12/17/2019       OBJECTIVE:  Physical Exam:  /73 (BP Location: Left arm, Patient Position: Sitting, Cuff Size: Adult Regular)   Pulse 82   Ht 1.676 m (5' 5.98\")   Wt 70.3 kg (155 lb)   SpO2 98%   BMI 25.03 kg/m    General Appearance: healthy, alert and no distress   Skin: no suspicious lesions or rashes  Neuro: Normal strength and tone, mentation intact and speech normal  Vascular: good pulses, and cappillary refill   Lymph: no lymphadenopathy   Psych:  mentation appears normal and affect normal/bright  Resp: no increased work of breathing     Right Hand Exam:  Demonstrates triggering of the right thumb.  Tender over A1 pulley.  No evidence of palmar fibromatosis on the right side. On the left side, there appears to be some isolated to the area of the 3rd trigger finger release.     ASSESSMENT:   Right thumb trigger finger    PLAN:   We talked about the options: taping/splinting the PIP joint periodically, corticosteroid injection, and trigger finger release. I have explained the nature of the surgical procedure, the risks and recovery time with the patient. He would like to proceed with a trigger thumb release.     The information in this document, created by a scribe for me, accurately reflects the services I personally performed and the decisions made by me. I have reviewed and approved this document for accuracy.     JULIEN Hair MD  Dept. Orthopedic Surgery  [facility]     "

## 2019-12-16 ENCOUNTER — TELEPHONE (OUTPATIENT)
Dept: FAMILY MEDICINE | Facility: CLINIC | Age: 59
End: 2019-12-16

## 2019-12-16 ENCOUNTER — ANCILLARY PROCEDURE (OUTPATIENT)
Dept: CT IMAGING | Facility: CLINIC | Age: 59
End: 2019-12-16
Attending: FAMILY MEDICINE
Payer: COMMERCIAL

## 2019-12-16 DIAGNOSIS — R10.84 ABDOMINAL PAIN, GENERALIZED: ICD-10-CM

## 2019-12-16 DIAGNOSIS — N50.82 SCROTAL PAIN: ICD-10-CM

## 2019-12-16 PROCEDURE — 74177 CT ABD & PELVIS W/CONTRAST: CPT | Mod: TC

## 2019-12-16 RX ORDER — IOPAMIDOL 755 MG/ML
76 INJECTION, SOLUTION INTRAVASCULAR ONCE
Status: COMPLETED | OUTPATIENT
Start: 2019-12-16 | End: 2019-12-16

## 2019-12-16 RX ADMIN — IOPAMIDOL 76 ML: 755 INJECTION, SOLUTION INTRAVASCULAR at 10:45

## 2019-12-17 ENCOUNTER — PREP FOR PROCEDURE (OUTPATIENT)
Dept: ORTHOPEDICS | Facility: CLINIC | Age: 59
End: 2019-12-17

## 2019-12-17 ENCOUNTER — OFFICE VISIT (OUTPATIENT)
Dept: ORTHOPEDICS | Facility: CLINIC | Age: 59
End: 2019-12-17
Payer: COMMERCIAL

## 2019-12-17 ENCOUNTER — ANESTHESIA EVENT (OUTPATIENT)
Dept: SURGERY | Facility: AMBULATORY SURGERY CENTER | Age: 59
End: 2019-12-17

## 2019-12-17 VITALS
DIASTOLIC BLOOD PRESSURE: 73 MMHG | SYSTOLIC BLOOD PRESSURE: 119 MMHG | BODY MASS INDEX: 24.91 KG/M2 | OXYGEN SATURATION: 98 % | HEIGHT: 66 IN | HEART RATE: 82 BPM | WEIGHT: 155 LBS

## 2019-12-17 DIAGNOSIS — M65.311 TRIGGER THUMB OF RIGHT HAND: Primary | ICD-10-CM

## 2019-12-17 DIAGNOSIS — M65.312 TRIGGER THUMB OF LEFT HAND: Primary | ICD-10-CM

## 2019-12-17 PROCEDURE — 99203 OFFICE O/P NEW LOW 30 MIN: CPT | Performed by: ORTHOPAEDIC SURGERY

## 2019-12-17 ASSESSMENT — MIFFLIN-ST. JEOR: SCORE: 1455.51

## 2019-12-17 NOTE — PROGRESS NOTES
Surgery orders need to note that surgery is for the Right side not Left.   Diagnosis needs to list it for Right side surgery.

## 2019-12-17 NOTE — TELEPHONE ENCOUNTER
I tried to call patient with CT results.  Patient not available.  I sent result note  CT normal  Follow up as needed based on symptoms  Nicolás Morejon MD

## 2019-12-17 NOTE — LETTER
12/17/2019         RE: Jose Wise  [address]  [place] [address]        Dear Colleague,    Thank you for referring your patient, Jose Wise, to the [facility]. Please see a copy of my visit note below.    SUBJECTIVE:   Jose Wise is a 59 year old male who is seen in consultation at the request of Dr. Morejon for evaluation of right thumb trigger finger that has been present for a couple months. No known injury. He complains today locking in the right thumb.    Orthopedic PMH: Previous trigger finger release on the left by Dr. Friend in the past.    Review of Systems:  Constitutional:  NEGATIVE for fever, chills, change in weight  Integumentary/Skin:  NEGATIVE for worrisome rashes, moles or lesions  Eyes:  NEGATIVE for vision changes or irritation  ENT/Mouth:  NEGATIVE for ear, mouth and throat problems  Resp:  NEGATIVE for significant cough or SOB  CV:  NEGATIVE for chest pain, palpitations or peripheral edema  GI:  NEGATIVE for nausea, abdominal pain, heartburn, or change in bowel habits  :  Negative   Musculoskeletal:  See HPI above  Neuro:  NEGATIVE for weakness, dizziness or paresthesias  Endocrine:  NEGATIVE for temperature intolerance, skin/hair changes  Heme/allergy/immune:  NEGATIVE for bleeding problems  Psychiatric:  NEGATIVE for changes in mood or affect    Past Medical History:   Past Medical History:   Diagnosis Date     Asthma      Benign positional vertigo      Chemical dependency (H)      Communic dis contact NEC      Gastroesophageal reflux disease      History of radiation therapy      Hoarseness      Impacted cerumen 10/14/2013     Impaired fasting glucose 9/4/2012     Lateral epicondylitis  of elbow      Pain in joint, upper arm      Pure hypercholesterolemia      Rosacea 12/5/2012     Seasonal allergies      Spasm of muscle      Substance abuse (H)     POLYSUBSTANCE     Tension headache, chronic 10/17/2012     Tinea versicolor 12/5/2012     Tinnitus       Tonsillar cancer (H) 2013    chemo and radiation     Unspecified asthma(493.90)     takes daily inhaler, never been intubated, no attacks  since 2012     Past Surgical History:   Past Surgical History:   Procedure Laterality Date     ABDOMEN SURGERY  january 2014    peg and port     BIOPSY  October 2013    fine needle aspiration in my neck     C HAND/FINGER SURGERY UNLISTED  12/13/10    Lt middle TF relese     COLONOSCOPY  1-17-11    Repeat 10 years     HERNIA REPAIR  1960    as an infant     INSERT PORT VASCULAR ACCESS  1/22/2014    Procedure: INSERT PORT VASCULAR ACCESS;  Insertion Vascular Port Access, Percutaneous Insertion Gastrostomy Tube ;  Surgeon: Adebayo Alston MD;  Location: [facility] OR     LARYNGOSCOPY, ESOPHAGOSCOPY,  BIOPSY, COMBINED  1/15/2014    Procedure: COMBINED LARYNGOSCOPY, ESOPHAGOSCOPY,  BIOPSY;  Direct Laryngoscopy, Esophagoscopy, Biopsies ;  Surgeon: Jayashree Alvarez MD;  Location: [facility] OR     ORTHOPEDIC SURGERY      fracture repair arm, shoulder     SOFT TISSUE SURGERY      cyst removal     Family History:   Family History   Problem Relation Age of Onset     Circulatory Mother 45        cerebral aneurysm     Alcohol/Drug Mother         sibling     Other - See Comments Mother         aneurysm     Substance Abuse Mother      Cancer Mother      Cardiovascular Father         Heart Attack     Coronary Artery Disease Father         heart attack/ triple bypass     Cancer Father         skin cancer     Skin Cancer Father      Other Cancer Brother         upper thorax     Substance Abuse Brother      Coronary Artery Disease Brother         stent     Cancer Sister         cervical     Cancer Brother         throat cancer     Gastrointestinal Disease Son         crohn's disease/ colostomy; step child     Cancer Sister      Cancer Brother      Melanoma No family hx of      Social History:   Social History     Tobacco Use     Smoking status: Former Smoker     Packs/day: 0.50      "Years: 25.00     Pack years: 12.50     Types: Cigarettes     Start date: 1980     Last attempt to quit: 2006     Years since quittin.9     Smokeless tobacco: Never Used     Tobacco comment: 1 pack a day   Substance Use Topics     Alcohol use: No     Comment: Used to be a heavy drinker- sober 10 years     This document serves as a record of the services and decisions personally performed and made by JULIEN Hair MD. It was created on his behalf by Daniel Suazo, a trained medical scribe. The creation of this document is based the provider's statements to the medical scribe.    Scribe Daniel Lilly Said 9:50 AM 2019       OBJECTIVE:  Physical Exam:  /73 (BP Location: Left arm, Patient Position: Sitting, Cuff Size: Adult Regular)   Pulse 82   Ht 1.676 m (5' 5.98\")   Wt 70.3 kg (155 lb)   SpO2 98%   BMI 25.03 kg/m     General Appearance: healthy, alert and no distress   Skin: no suspicious lesions or rashes  Neuro: Normal strength and tone, mentation intact and speech normal  Vascular: good pulses, and cappillary refill   Lymph: no lymphadenopathy   Psych:  mentation appears normal and affect normal/bright  Resp: no increased work of breathing     Right Hand Exam:  Demonstrates triggering of the right thumb.  Tender over A1 pulley.  No evidence of palmar fibromatosis on the right side. On the left side, there appears to be some isolated to the area of the 3rd trigger finger release.     ASSESSMENT:   Right thumb trigger finger    PLAN:   We talked about the options: taping/splinting the PIP joint periodically, corticosteroid injection, and trigger finger release. I have explained the nature of the surgical procedure, the risks and recovery time with the patient. He would like to proceed with a trigger thumb release.     The information in this document, created by a scribe for me, accurately reflects the services I personally performed and the decisions made by me. I have reviewed and " approved this document for accuracy.     JULIEN Hair MD  Dept. Orthopedic Surgery  [facility]       Again, thank you for allowing me to participate in the care of your patient.        Sincerely,        Hamlet Hair MD

## 2019-12-18 ENCOUNTER — ANESTHESIA (OUTPATIENT)
Dept: SURGERY | Facility: AMBULATORY SURGERY CENTER | Age: 59
End: 2019-12-18

## 2019-12-18 ENCOUNTER — HOSPITAL ENCOUNTER (OUTPATIENT)
Facility: AMBULATORY SURGERY CENTER | Age: 59
Discharge: HOME | End: 2019-12-18
Attending: ORTHOPAEDIC SURGERY | Admitting: ORTHOPAEDIC SURGERY
Payer: COMMERCIAL

## 2019-12-18 VITALS
SYSTOLIC BLOOD PRESSURE: 130 MMHG | TEMPERATURE: 97.7 F | DIASTOLIC BLOOD PRESSURE: 78 MMHG | OXYGEN SATURATION: 96 % | RESPIRATION RATE: 16 BRPM

## 2019-12-18 DIAGNOSIS — M65.311 TRIGGER THUMB OF RIGHT HAND: ICD-10-CM

## 2019-12-18 DIAGNOSIS — M65.312 TRIGGER THUMB OF LEFT HAND: ICD-10-CM

## 2019-12-18 PROCEDURE — 26055 INCISE FINGER TENDON SHEATH: CPT | Mod: F5

## 2019-12-18 PROCEDURE — G8918 PT W/O PREOP ORDER IV AB PRO: HCPCS

## 2019-12-18 PROCEDURE — G8907 PT DOC NO EVENTS ON DISCHARG: HCPCS

## 2019-12-18 PROCEDURE — 26055 INCISE FINGER TENDON SHEATH: CPT | Mod: F5 | Performed by: ORTHOPAEDIC SURGERY

## 2019-12-18 RX ORDER — HYDROCODONE BITARTRATE AND ACETAMINOPHEN 5; 325 MG/1; MG/1
1-2 TABLET ORAL EVERY 4 HOURS PRN
Qty: 10 TABLET | Refills: 0 | Status: SHIPPED | OUTPATIENT
Start: 2019-12-18 | End: 2019-12-20

## 2019-12-18 NOTE — OP NOTE
OPERATIVE / PROCEDURE    DATE OF SERVICE:  December 18, 2019    PREOPERATIVE DIAGNOSIS   Right trigger thumb.     POSTOPERATIVE DIAGNOSIS   Right trigger thumb.    OPERATIVE PROCEDURE   Right trigger thumb release.     SURGEON  Hamlet Chiu M.D.     FIRST ASSISTANT   PATRICIA Mcintosh.     ANESTHESIA  Local     INDICATIONS  This is a  59 year old year-old male with locking of the right thumb, felt to be  a trigger thumb.  Had failed conservative treatment.  Informed  consent was obtained for the procedure.     PROCEDURE IN DETAIL  The patient was taken to the operating room and placed on the operating table  in supine position. Tourniquet was placed around the proximal forearm. Limb  was prepped and draped in the usual sterile fashion. A combination of 0.25%  Marcaine and 1% lidocaine, both without epinephrine, were injected into the  anticipated incision site. The limb was exsanguinated, and tourniquet  inflated to 250 mmHg. A transverse incision was made in the flexion  crease of the palm over the A1 pulley of the right  thumb flexor tendon.   Once through the dermal layer, spreading technique was   used and retractors inserted to avoid any  neurovascular injury. I then identified the flexor tendon sheath,   and the A1 pulley and incised it with a #11  blade. I used a Pratima to make sure that it had been  completely. We  then had him try to lock up the digit, which he could not. I also observed  that there were some tendinous changes within the flexor tendon, right about the  area of stenosis. The wound was then irrigated and the tourniquet deflated.  Minimal bleeders were encountered.     Then the wound was closed with interrupted 5-0 nylon sutures placed in a  vertical mattress fashion. Sterile dressings were applied. he was then  transferred hospital cart and to the recovery area in stable condition.       HAMLET CIHU MD

## 2019-12-18 NOTE — DISCHARGE INSTRUCTIONS
[facility]  Same-Day Surgery   Adult Discharge Orders & Instructions   For 24 hours after surgery  1. Get plenty of rest.  A responsible adult must stay with you for at least 24 hours after you leave the hospital.   2. Do not drive or use heavy equipment.  If you have weakness or tingling, don't drive or use heavy equipment until this feeling goes away.  3. Do not drink alcohol.  4. Avoid strenuous or risky activities.  Ask for help when climbing stairs.   5. You may feel lightheaded.  IF so, sit for a few minutes before standing.  Have someone help you get up.   6. If you have nausea (feel sick to your stomach): Drink only clear liquids such as apple juice, ginger ale, broth or 7-Up.  Rest may also help.  Be sure to drink enough fluids.  Move to a regular diet as you feel able.  7. You may have a slight fever. Call the doctor if your fever is over 100 F (37.7 C) (taken under the tongue) or lasts longer than 24 hours.  8. You may have a dry mouth, a sore throat, muscle aches or trouble sleeping.  These should go away after 24 hours.  9. Do not make important or legal decisions.   Call your doctor for any of the followin.  Signs of infection (fever, growing tenderness at the surgery site, a large amount of drainage or bleeding, severe pain, foul-smelling drainage, redness, swelling).    2. It has been over 8 to 10 hours since surgery and you are still not able to urinate (pass water).    3.  Headache for over 24 hours.

## 2019-12-26 NOTE — PROGRESS NOTES
Jose Wise is here for his first postop visit, status post 12/18/19 right trigger thumb release.      Exam:  Wounds look good, no evidence of infection.   No significant erythema or swelling. He has very dry skin in the area. Neurovascular exam within normal limits.  ROM: good  Sutures were removed today.  CMS is intact.    Assessment:     He is doing well.    Plan:  Scar management discussed.  Sutures removed today.  Discussed precautions for the next week or so.  Return to clinic: as needed     JULIEN Hair MD  Dept. Orthopedic Surgery  [facility]

## 2020-01-06 ENCOUNTER — OFFICE VISIT (OUTPATIENT)
Dept: ORTHOPEDICS | Facility: CLINIC | Age: 60
End: 2020-01-06
Payer: COMMERCIAL

## 2020-01-06 VITALS
DIASTOLIC BLOOD PRESSURE: 74 MMHG | OXYGEN SATURATION: 99 % | SYSTOLIC BLOOD PRESSURE: 118 MMHG | WEIGHT: 155 LBS | HEIGHT: 67 IN | HEART RATE: 78 BPM | BODY MASS INDEX: 24.33 KG/M2

## 2020-01-06 DIAGNOSIS — M65.311 TRIGGER THUMB OF RIGHT HAND: Primary | ICD-10-CM

## 2020-01-06 PROCEDURE — 99024 POSTOP FOLLOW-UP VISIT: CPT | Performed by: ORTHOPAEDIC SURGERY

## 2020-01-06 ASSESSMENT — MIFFLIN-ST. JEOR: SCORE: 1471.71

## 2020-01-06 NOTE — LETTER
1/6/2020         RE: Jose Wise  [address]  [place] [address]        Dear Colleague,    Thank you for referring your patient, Jose iWse, to the [facility]. Please see a copy of my visit note below.    Jose Wise is here for his first postop visit, status post 12/18/19 right trigger thumb release.      Exam:  Wounds look good, no evidence of infection.   No significant erythema or swelling. He has very dry skin in the area. Neurovascular exam within normal limits.  ROM: good  Sutures were removed today.  CMS is intact.    Assessment:     He is doing well.    Plan:  Scar management discussed.  Sutures removed today.  Discussed precautions for the next week or so.  Return to clinic: as needed     JULIEN Hair MD  Dept. Orthopedic Surgery  [facility]     Again, thank you for allowing me to participate in the care of your patient.        Sincerely,        Hamlet Hair MD

## 2020-05-15 ENCOUNTER — MYC MEDICAL ADVICE (OUTPATIENT)
Dept: FAMILY MEDICINE | Facility: CLINIC | Age: 60
End: 2020-05-15

## 2020-05-15 NOTE — TELEPHONE ENCOUNTER
I called patient and left message for patient to call back regarding scheduling a preop  Hemalatha Parikh CMA

## 2020-05-19 ENCOUNTER — OFFICE VISIT (OUTPATIENT)
Dept: FAMILY MEDICINE | Facility: CLINIC | Age: 60
End: 2020-05-19
Payer: COMMERCIAL

## 2020-05-19 VITALS
DIASTOLIC BLOOD PRESSURE: 78 MMHG | HEART RATE: 81 BPM | WEIGHT: 154 LBS | TEMPERATURE: 98 F | BODY MASS INDEX: 24.12 KG/M2 | SYSTOLIC BLOOD PRESSURE: 120 MMHG

## 2020-05-19 DIAGNOSIS — Z01.818 PREOP GENERAL PHYSICAL EXAM: Primary | ICD-10-CM

## 2020-05-19 DIAGNOSIS — H26.9 CATARACT OF BOTH EYES, UNSPECIFIED CATARACT TYPE: ICD-10-CM

## 2020-05-19 LAB
BASOPHILS # BLD AUTO: 0 10E9/L (ref 0–0.2)
BASOPHILS NFR BLD AUTO: 0.2 %
CREAT SERPL-MCNC: 1 MG/DL (ref 0.66–1.25)
DIFFERENTIAL METHOD BLD: NORMAL
EOSINOPHIL # BLD AUTO: 0.1 10E9/L (ref 0–0.7)
EOSINOPHIL NFR BLD AUTO: 2.2 %
ERYTHROCYTE [DISTWIDTH] IN BLOOD BY AUTOMATED COUNT: 12.8 % (ref 10–15)
GFR SERPL CREATININE-BSD FRML MDRD: 81 ML/MIN/{1.73_M2}
HCT VFR BLD AUTO: 43.4 % (ref 40–53)
HGB BLD-MCNC: 14.7 G/DL (ref 13.3–17.7)
LYMPHOCYTES # BLD AUTO: 1.1 10E9/L (ref 0.8–5.3)
LYMPHOCYTES NFR BLD AUTO: 17.4 %
MCH RBC QN AUTO: 31.1 PG (ref 26.5–33)
MCHC RBC AUTO-ENTMCNC: 33.9 G/DL (ref 31.5–36.5)
MCV RBC AUTO: 92 FL (ref 78–100)
MONOCYTES # BLD AUTO: 0.8 10E9/L (ref 0–1.3)
MONOCYTES NFR BLD AUTO: 12.4 %
NEUTROPHILS # BLD AUTO: 4.3 10E9/L (ref 1.6–8.3)
NEUTROPHILS NFR BLD AUTO: 67.8 %
PLATELET # BLD AUTO: 202 10E9/L (ref 150–450)
POTASSIUM SERPL-SCNC: 4.1 MMOL/L (ref 3.4–5.3)
RBC # BLD AUTO: 4.73 10E12/L (ref 4.4–5.9)
WBC # BLD AUTO: 6.3 10E9/L (ref 4–11)

## 2020-05-19 PROCEDURE — 84132 ASSAY OF SERUM POTASSIUM: CPT | Performed by: FAMILY MEDICINE

## 2020-05-19 PROCEDURE — 99214 OFFICE O/P EST MOD 30 MIN: CPT | Performed by: FAMILY MEDICINE

## 2020-05-19 PROCEDURE — 85025 COMPLETE CBC W/AUTO DIFF WBC: CPT | Performed by: FAMILY MEDICINE

## 2020-05-19 PROCEDURE — 36415 COLL VENOUS BLD VENIPUNCTURE: CPT | Performed by: FAMILY MEDICINE

## 2020-05-19 PROCEDURE — 82565 ASSAY OF CREATININE: CPT | Performed by: FAMILY MEDICINE

## 2020-05-19 ASSESSMENT — PAIN SCALES - GENERAL: PAINLEVEL: NO PAIN (0)

## 2020-05-19 NOTE — PROGRESS NOTES
[address] [id number] [phone]  Dept: [phone]    PRE-OP EVALUATION:  Today's date: 2020    Jose Wise (: 1960) presents for pre-operative evaluation assessment as requested by Dr. Pate.  He requires evaluation and anesthesia risk assessment prior to undergoing surgery/procedure for treatment of cataract .    Proposed Surgery/ Procedure: Bilateral cataract  Date of Surgery/ Procedure:  and 06/15/2020  Time of Surgery/ Procedure:   Hospital/Surgical Facility: [facility]  Fax number for surgical facility: [phone]  Primary Physician: Nicolás Morejon  Type of Anesthesia Anticipated: Local    Patient has a Health Care Directive or Living Will:  YES     1. NO - Do you have a history of heart attack, stroke, stent, bypass or surgery on an artery in the head, neck, heart or legs?  2. NO - Do you ever have any pain or discomfort in your chest?  3. NO - Do you have a history of  Heart Failure?  4. NO - Are you troubled by shortness of breath when: walking on the level, up a slight hill or at night?  5. NO - Do you currently have a cold, bronchitis or other respiratory infection?  6. NO - Do you have a cough, shortness of breath or wheezing?  7. NO - Do you sometimes get pains in the calves of your legs when you walk?  8. NO - Do you or anyone in your family have previous history of blood clots?  9. NO - Do you or does anyone in your family have a serious bleeding problem such as prolonged bleeding following surgeries or cuts?  10. NO - Have you ever had problems with anemia or been told to take iron pills?  11. NO - Have you had any abnormal blood loss such as black, tarry or bloody stools, or abnormal vaginal bleeding?  12. NO - Have you ever had a blood transfusion?  13. NO - Have you or any of your relatives ever had problems with anesthesia?  14. NO - Do you have sleep apnea, excessive snoring or  daytime drowsiness?  15. NO - Do you have any prosthetic heart valves?  16. NO - Do you have prosthetic joints?  17. NO - Is there any chance that you may be pregnant?      HPI:     HPI related to upcoming procedure: 59 year old male with bilateral cataracts.  To have one done June 1 and the next on Zulma 15.           MEDICAL HISTORY:     Patient Active Problem List    Diagnosis Date Noted     Trigger thumb of left hand 12/17/2019     Priority: Medium     Added automatically from request for surgery [id number]       Trigger thumb of right hand 12/17/2019     Priority: Medium     Added automatically from request for surgery [id number]       Dermatitis, seborrheic 10/17/2016     Priority: Medium     Intermittent asthma, uncomplicated 11/23/2015     Priority: Medium     Counseling regarding advanced directives 10/29/2014     Priority: Medium     MALIG NEOPLASM TONSILLAR FOSSA 01/20/2014     Priority: Medium     Tinea versicolor 12/05/2012     Priority: Medium     Rosacea 12/05/2012     Priority: Medium     Dr. Carlson;  [facility].  Yearly visits.       Tension headache, chronic 10/17/2012     Priority: Medium     Impaired fasting glucose 09/04/2012     Priority: Medium     CARDIOVASCULAR SCREENING; LDL GOAL LESS THAN 130 12/30/2011     Priority: Medium     Trigger finger, left long 11/26/2010     Priority: Medium      Past Medical History:   Diagnosis Date     Asthma      Benign positional vertigo      Chemical dependency (H)      Communic dis contact [facility]      Gastroesophageal reflux disease      History of radiation therapy      Hoarseness      Impacted cerumen 10/14/2013     Impaired fasting glucose 9/4/2012     Lateral epicondylitis  of elbow      Pain in joint, upper arm      Pure hypercholesterolemia      Rosacea 12/5/2012     Seasonal allergies      Spasm of muscle      Substance abuse (H)     POLYSUBSTANCE     Tension headache, chronic 10/17/2012     Tinea versicolor 12/5/2012     Tinnitus      Tonsillar  cancer (H) 2013    chemo and radiation     Unspecified asthma(493.90)     takes daily inhaler, never been intubated, no attacks  since 2012     Past Surgical History:   Procedure Laterality Date     ABDOMEN SURGERY  january 2014    peg and port     BIOPSY  October 2013    fine needle aspiration in my neck     C HAND/FINGER SURGERY UNLISTED  12/13/10    Lt middle TF relese     COLONOSCOPY  1-17-11    Repeat 10 years     HERNIA REPAIR  1960    as an infant     INSERT PORT VASCULAR ACCESS  1/22/2014    Procedure: INSERT PORT VASCULAR ACCESS;  Insertion Vascular Port Access, Percutaneous Insertion Gastrostomy Tube ;  Surgeon: Adebayo Alston MD;  Location: [facility] OR     LARYNGOSCOPY, ESOPHAGOSCOPY,  BIOPSY, COMBINED  1/15/2014    Procedure: COMBINED LARYNGOSCOPY, ESOPHAGOSCOPY,  BIOPSY;  Direct Laryngoscopy, Esophagoscopy, Biopsies ;  Surgeon: Jayashree Alvarez MD;  Location: [facility] OR     ORTHOPEDIC SURGERY      fracture repair arm, shoulder     RELEASE TRIGGER FINGER Right 12/18/2019    Procedure: RELEASE, TRIGGER FINGER, right thumb ;  Surgeon: Hamlet Hair MD;  Location: [facility] OR     SOFT TISSUE SURGERY      cyst removal     Current Outpatient Medications   Medication Sig Dispense Refill     albuterol (VENTOLIN HFA) 108 (90 Base) MCG/ACT inhaler Inhale 2 puffs into the lungs 4 times daily as needed 1 Inhaler 11     calcium carbonate (OS-KAVYA 500 MG [place]. [place]) 500 MG tablet Take by mouth 2 times daily       Glucosamine-Chondroitin (GLUCOSAMINE CHONDR COMPLEX PO) Take 1,000 mg by mouth       metroNIDAZOLE (METROCREAM) 0.75 % cream Apply topically 2 times daily as needed 45 g 11     mometasone (ASMANEX, 30 METERED DOSES,) 220 MCG/INH inhaler Inhale 1 puff into the lungs daily 3 Inhaler 3     Multiple Vitamin (MULTI-VITAMIN PO) Take  by mouth. Occasionally         Omega-3 Fatty Acids (FISH OIL) 1200 MG CAPS        OMEGA-3 FATTY ACIDS-VITAMIN E PO Take 1,000 mg by mouth        omeprazole (PRILOSEC) 20 MG   capsule TAKE 1 CAPSULE BY MOUTH EVERY DAY 90 capsule 3     OTC products: occasional aspirin as as needed     Allergies   Allergen Reactions     Animal Dander Itching     Pet hair causes watery eyes and sneezing     Grass Itching     Red tipped grasses, sneezing and watery eyes      Latex Allergy: NO    Social History     Tobacco Use     Smoking status: Former Smoker     Packs/day: 0.50     Years: 25.00     Pack years: 12.50     Types: Cigarettes     Start date: 1980     Last attempt to quit: 2006     Years since quittin.3     Smokeless tobacco: Never Used     Tobacco comment: 1 pack a day   Substance Use Topics     Alcohol use: Not Currently     Comment: Used to be a heavy drinker- sober 10 years     History   Drug Use No     Comment: None currently, used to use crack cocaine- 10 years ago       REVIEW OF SYSTEMS:   Constitutional, neuro, ENT, endocrine, pulmonary, cardiac, gastrointestinal, genitourinary, musculoskeletal, integument and psychiatric systems are negative, except as otherwise noted.    No recent illnesses    Had tonsillar cancer , cured.  No follow up needed at this point    Patient retired last year       EXAM:   /78 (BP Location: Right arm, Patient Position: Chair, Cuff Size: Adult Regular)   Pulse 81   Temp 98  F (36.7  C) (Oral)   Wt 69.9 kg (154 lb)   BMI 24.12 kg/m      GENERAL APPEARANCE: healthy, alert and no distress     EYES: EOMI,  PERRL     HENT: ear canals and TM's normal and nose and mouth without ulcers or lesions     NECK: no adenopathy, no asymmetry, masses, or scars and thyroid normal to palpation     RESP: lungs clear to auscultation - no rales, rhonchi or wheezes     CV: regular rates and rhythm, normal S1 S2, no S3 or S4 and no murmur, click or rub     ABDOMEN:  soft, nontender, no HSM or masses and bowel sounds normal     MS: extremities normal- no gross deformities noted, no evidence of inflammation in joints, FROM in all extremities.     SKIN: no  suspicious lesions or rashes     NEURO: Normal strength and tone, sensory exam grossly normal, mentation intact and speech normal     PSYCH: mentation appears normal. and affect normal/bright     LYMPHATICS: No cervical adenopathy    DIAGNOSTICS:    no ekg needed for cataract    Labs done, pending    Recent Labs   Lab Test 09/25/19  0810 05/28/19  1355  10/12/18  1357  07/01/14  1040   HGB 15.6 14.3   < >  --    < >  --     168   < >  --    < >  --    INR  --   --   --   --   --  1.0    139   < >  --    < >  --    POTASSIUM 4.4 3.8   < >  --    < >  --    CR 1.04 1.00   < >  --    < >  --    A1C 5.5  --   --  5.7*   < >  --     < > = values in this interval not displayed.        IMPRESSION:   Reason for surgery/procedure: bilateral cataracts  Diagnosis/reason for consult: preop clearance    The proposed surgical procedure is considered LOW risk.    REVISED CARDIAC RISK INDEX  The patient has the following serious cardiovascular risks for perioperative complications such as (MI, PE, VFib and 3  AV Block):  No serious cardiac risks  INTERPRETATION: 0 risks: Class I (very low risk - 0.4% complication rate)    The patient has the following additional risks for perioperative complications:  No identified additional risks      ICD-10-CM    1. Preop general physical exam  Z01.818        RECOMMENDATIONS:        --Patient is to take all scheduled medications on the day of surgery EXCEPT for modifications listed below.    No meds needed morning of surgery    Hold aspirin for a week prior and omega 3 also    APPROVAL GIVEN to proceed with proposed procedure, without further diagnostic evaluation, providing labs are okay    Patient has no cardiac history or symptoms    No history of anesthesia problems    No bleeding or clotting problems       Signed Electronically by: Nicolás Morejon MD    Copy of this evaluation report is provided to requesting physician.    [facility] Preop Guidelines    Revised Cardiac Risk  Index

## 2020-05-19 NOTE — PATIENT INSTRUCTIONS
Before Your Surgery      Call your surgeon if there is any change in your health. This includes signs of a cold or flu (such as a sore throat, runny nose, cough, rash or fever).    Do not smoke, drink alcohol or take over the counter medicine (unless your surgeon or primary care doctor tells you to) for the 24 hours before and after surgery.    If you take prescribed drugs: Follow your doctor s orders about which medicines to take and which to stop until after surgery.    Eating and drinking prior to surgery: follow the instructions from your surgeon    Take a shower or bath the night before surgery. Use the soap your surgeon gave you to gently clean your skin. If you do not have soap from your surgeon, use your regular soap. Do not shave or scrub the surgery site.  Wear clean pajamas and have clean sheets on your bed.                    Hold aspirin and fish oil/ omega 3 for one week prior to each procedure; we will send you lab results            Call with problems / questions

## 2020-05-20 ASSESSMENT — ASTHMA QUESTIONNAIRES: ACT_TOTALSCORE: 25

## 2020-05-20 NOTE — RESULT ENCOUNTER NOTE
Your preop labs are fine.    Nicolás Morejon MD    TC - please fax to [facility]  Thanks  Nicolás Morejon MD

## 2020-10-14 ENCOUNTER — MYC MEDICAL ADVICE (OUTPATIENT)
Dept: FAMILY MEDICINE | Facility: CLINIC | Age: 60
End: 2020-10-14

## 2020-11-09 ENCOUNTER — MYC MEDICAL ADVICE (OUTPATIENT)
Dept: FAMILY MEDICINE | Facility: CLINIC | Age: 60
End: 2020-11-09

## 2020-11-09 NOTE — TELEPHONE ENCOUNTER
LDL Cholesterol Calculated   Date Value Ref Range Status   09/25/2019 133 (H) <100 mg/dL Final     Comment:     Above desirable:  100-129 mg/dl  Borderline High:  130-159 mg/dL  High:             160-189 mg/dL  Very high:       >189 mg/dl       Routed response to patient advising lab is due so he could come in fasting.    Angeles Pereira RN  [facility]

## 2020-11-11 ENCOUNTER — OFFICE VISIT (OUTPATIENT)
Dept: FAMILY MEDICINE | Facility: CLINIC | Age: 60
End: 2020-11-11
Payer: COMMERCIAL

## 2020-11-11 VITALS
HEART RATE: 69 BPM | BODY MASS INDEX: 24.03 KG/M2 | WEIGHT: 153.13 LBS | HEIGHT: 67 IN | DIASTOLIC BLOOD PRESSURE: 86 MMHG | TEMPERATURE: 98 F | SYSTOLIC BLOOD PRESSURE: 136 MMHG

## 2020-11-11 DIAGNOSIS — Z13.1 SCREENING FOR DIABETES MELLITUS: ICD-10-CM

## 2020-11-11 DIAGNOSIS — K21.9 GASTROESOPHAGEAL REFLUX DISEASE, UNSPECIFIED WHETHER ESOPHAGITIS PRESENT: ICD-10-CM

## 2020-11-11 DIAGNOSIS — Z00.00 ROUTINE GENERAL MEDICAL EXAMINATION AT A HEALTH CARE FACILITY: Primary | ICD-10-CM

## 2020-11-11 DIAGNOSIS — J45.20 INTERMITTENT ASTHMA, UNCOMPLICATED: ICD-10-CM

## 2020-11-11 DIAGNOSIS — Z13.29 SCREENING FOR HYPOTHYROIDISM: ICD-10-CM

## 2020-11-11 DIAGNOSIS — Z12.5 SCREENING FOR PROSTATE CANCER: ICD-10-CM

## 2020-11-11 DIAGNOSIS — Z13.6 CARDIOVASCULAR SCREENING; LDL GOAL LESS THAN 100: ICD-10-CM

## 2020-11-11 LAB — HBA1C MFR BLD: 5.6 % (ref 0–5.6)

## 2020-11-11 PROCEDURE — 83036 HEMOGLOBIN GLYCOSYLATED A1C: CPT | Performed by: FAMILY MEDICINE

## 2020-11-11 PROCEDURE — G0103 PSA SCREENING: HCPCS | Performed by: FAMILY MEDICINE

## 2020-11-11 PROCEDURE — 99396 PREV VISIT EST AGE 40-64: CPT | Performed by: FAMILY MEDICINE

## 2020-11-11 PROCEDURE — 84443 ASSAY THYROID STIM HORMONE: CPT | Performed by: FAMILY MEDICINE

## 2020-11-11 PROCEDURE — 80061 LIPID PANEL: CPT | Performed by: FAMILY MEDICINE

## 2020-11-11 ASSESSMENT — MIFFLIN-ST. JEOR: SCORE: 1450.81

## 2020-11-11 ASSESSMENT — PAIN SCALES - GENERAL: PAINLEVEL: NO PAIN (0)

## 2020-11-11 NOTE — PROGRESS NOTES
3  SUBJECTIVE:   CC: Jose Wise is an 60 year old male who presents for preventive health visit.        Patient has been advised of split billing requirements and indicates understanding: Yes  Healthy Habits:    Do you get at least three servings of calcium containing foods daily (dairy, green leafy vegetables, etc.)? yes    Amount of exercise or daily activities, outside of work: 3 day(s) per week    Problems taking medications regularly No    Medication side effects: No    Have you had an eye exam in the past two years? yes    Do you see a dentist twice per year? yes    Do you have sleep apnea, excessive snoring or daytime drowsiness?no           Today's PHQ-2 Score:   PHQ-2 (  [employer]) 2020   Q1: Little interest or pleasure in doing things 0 0   Q2: Feeling down, depressed or hopeless 0 0   PHQ-2 Score 0 0   Q1: Little interest or pleasure in doing things - -   Q2: Feeling down, depressed or hopeless - -   PHQ-2 Score - -       Abuse: Current or Past(Physical, Sexual or Emotional)- No  Do you feel safe in your environment? Yes        Social History     Tobacco Use     Smoking status: Former Smoker     Packs/day: 0.50     Years: 25.00     Pack years: 12.50     Types: Cigarettes     Start date: 1980     Quit date: 2006     Years since quittin.8     Smokeless tobacco: Never Used     Tobacco comment: 1 pack a day   Substance Use Topics     Alcohol use: Not Currently     Comment: Used to be a heavy drinker- sober 10 years     If you drink alcohol do you typically have >3 drinks per day or >7 drinks per week? Not Applicable                      Last PSA:   PSA   Date Value Ref Range Status   2019 0.55 0 - 4 ug/L Final     Comment:     Assay Method:  Chemiluminescence using Siemens Vista analyzer       Reviewed orders with patient. Reviewed health maintenance and updated orders accordingly - Yes       Reviewed and updated as needed this visit by clinical staff  Tobacco   "Allergies  Meds   Med Hx  Surg Hx  Fam Hx  Soc Hx        Reviewed and updated as needed this visit by Provider                     ROS:  no illnesses  No change  In meds    Hardly ever needs albuterol    About every other day on the ppi  Prescription    Only occasionally uses the metronidazole cream    asmanex  Still taking    physioball in basement    No cardio    Walk 2x  Daily    Retired  Last summer    Good result from  Cataract surgery          OBJECTIVE:   /86 (BP Location: Right arm, Patient Position: Chair, Cuff Size: Adult Regular)   Pulse 69   Temp 98  F (36.7  C) (Oral)   Ht 1.69 m (5' 6.54\")   Wt 69.5 kg (153 lb 2 oz)   BMI 24.32 kg/m    EXAM:  GENERAL: healthy, alert and no distress  EYES: Eyes grossly normal to inspection, PERRL and conjunctivae and sclerae normal  HENT: ear canals and TM's normal, nose and mouth without ulcers or lesions  NECK: no adenopathy, no asymmetry, masses, or scars and thyroid normal to palpation  RESP: lungs clear to auscultation - no rales, rhonchi or wheezes  CV: regular rate and rhythm, normal S1 S2, no S3 or S4, no murmur, click or rub, no peripheral edema and peripheral pulses strong  ABDOMEN: soft, nontender, no hepatosplenomegaly, no masses and bowel sounds normal  MS: no gross musculoskeletal defects noted, no edema  SKIN: no suspicious lesions or rashes  NEURO: Normal strength and tone, mentation intact and speech normal  PSYCH: mentation appears normal, affect normal/bright    Diagnostic Test Results:  Labs reviewed in Epic    ASSESSMENT/PLAN:   Jose was seen today for physical and health maintenance.    Diagnoses and all orders for this visit:    Routine general medical examination at a health care facility    Intermittent asthma, uncomplicated  -     mometasone (ASMANEX, 30 METERED DOSES,) 220 MCG/INH inhaler; USE 1 INHALATION DAILY    Gastroesophageal reflux disease, unspecified whether esophagitis present  -     omeprazole (PRILOSEC) 20 MG DR" "capsule; TAKE 1 CAPSULE BY MOUTH EVERY DAY    Screening for prostate cancer  -     Prostate spec antigen screen    Screening for diabetes mellitus  -     Hemoglobin A1c    CARDIOVASCULAR SCREENING; LDL GOAL LESS THAN 100  -     Lipid panel reflex to direct LDL Fasting    Screening for hypothyroidism  -     TSH with free T4 reflex    Overall patient stable  Check labs  Refill meds  Up to date on colonoscopy; due 2021  Keep working on healthy diet/exercise     Patient has been advised of split billing requirements and indicates understanding: Yes  COUNSELING:  Reviewed preventive health counseling, as reflected in patient instructions       Regular exercise       Healthy diet/nutrition       Vision screening       Safe sex practices/STD prevention       Colon cancer screening       Prostate cancer screening    Estimated body mass index is 24.32 kg/m  as calculated from the following:    Height as of this encounter: 1.69 m (5' 6.54\").    Weight as of this encounter: 69.5 kg (153 lb 2 oz).        He reports that he quit smoking about 14 years ago. His smoking use included cigarettes. He started smoking about 40 years ago. He has a 12.50 pack-year smoking history. He has never used smokeless tobacco.      Counseling Resources:  ATP IV Guidelines  Pooled Cohorts Equation Calculator  FRAX Risk Assessment  ICSI Preventive Guidelines  Dietary Guidelines for Americans, 2010  [place]'s MyPlate  ASA Prophylaxis  Lung CA Screening    Nicolás Morejon MD  [facility]  "

## 2020-11-11 NOTE — LETTER
My Asthma Action Plan    Name: Jose Wise   YOB: 1960  Date: 11/11/2020   My doctor: Nicolás Morejon MD   My clinic: [facility]        My Rescue Medicine:   Albuterol inhaler (Proair/Ventolin/Proventil HFA)  2-4 puffs EVERY 4 HOURS as needed. Use a spacer if recommended by your provider.   My Asthma Severity:   Intermittent / Exercise Induced  Know your asthma triggers: see list             GREEN ZONE   Good Control    I feel good    No cough or wheeze    Can work, sleep and play without asthma symptoms       Take your asthma control medicine every day.     1. If exercise triggers your asthma, take your rescue medication    15 minutes before exercise or sports, and    During exercise if you have asthma symptoms  2. Spacer to use with inhaler: If you have a spacer, make sure to use it with your inhaler             YELLOW ZONE Getting Worse  I have ANY of these:    I do not feel good    Cough or wheeze    Chest feels tight    Wake up at night   1. Keep taking your Green Zone medications  2. Start taking your rescue medicine:    every 20 minutes for up to 1 hour. Then every 4 hours for 24-48 hours.  3. If you stay in the Yellow Zone for more than 12-24 hours, contact your doctor.  4. If you do not return to the Green Zone in 12-24 hours or you get worse, start taking your oral steroid medicine if prescribed by your provider.           RED ZONE Medical Alert - Get Help  I have ANY of these:    I feel awful    Medicine is not helping    Breathing getting harder    Trouble walking or talking    Nose opens wide to breathe       1. Take your rescue medicine NOW  2. If your provider has prescribed an oral steroid medicine, start taking it NOW  3. Call your doctor NOW  4. If you are still in the Red Zone after 20 minutes and you have not reached your doctor:    Take your rescue medicine again and    Call 911 or go to the emergency room right away    See your regular  doctor within 2 weeks of an Emergency Room or Urgent Care visit for follow-up treatment.          Annual Reminders:  Meet with Asthma Educator,  Flu Shot in the Fall, consider Pneumonia Vaccination for patients with asthma (aged 19 and older).    Pharmacy:    [facility]  [id number] - [place] - [address]  [facility] - [address] [facility] - [place] - [address] [facility] [place], [place] - [address] [id number]  [place], [place] - [address]  [facility] - [address]    Electronically signed by Nicolás Morejon MD   Date: 11/11/20                    Asthma Triggers  How To Control Things That Make Your Asthma Worse    Triggers are things that make your asthma worse.  Look at the list below to help you find your triggers and   what you can do about them. You can help prevent asthma flare-ups by staying away from your triggers.      Trigger                                                          What you can do   Cigarette Smoke  Tobacco smoke can make asthma worse. Do not allow smoking in your home, car or around you.  Be sure no one smokes at a child s day care or school.  If you smoke, ask your health care provider for ways to help you quit.  Ask family members to quit too.  Ask your health care provider for a referral to Quit Plan to help you quit smoking, or call 1-092-605-PLAN.     Colds, Flu, Bronchitis  These are common triggers of asthma. Wash your hands often.  Don t touch your eyes, nose or mouth.  Get a flu shot every year.     Dust Mites  These are tiny bugs that live in cloth or carpet. They are too small to see. Wash sheets and blankets in hot water every week.   Encase pillows and mattress in dust mite proof covers.  Avoid having carpet if you can. If  you have carpet, vacuum weekly.   Use a dust mask and HEPA vacuum.   Pollen and Outdoor Mold  Some people are allergic to trees, grass, or weed pollen, or molds. Try to keep your windows closed.  Limit time out doors when pollen count is high.   Ask you health care provider about taking medicine during allergy season.     Animal Dander  Some people are allergic to skin flakes, urine or saliva from pets with fur or feathers. Keep pets with fur or feathers out of your home.    If you can t keep the pet outdoors, then keep the pet out of your bedroom.  Keep the bedroom door closed.  Keep pets off cloth furniture and away from stuffed toys.     Mice, Rats, and Cockroaches  Some people are allergic to the waste from these pests.   Cover food and garbage.  Clean up spills and food crumbs.  Store grease in the refrigerator.   Keep food out of the bedroom.   Indoor Mold  This can be a trigger if your home has high moisture. Fix leaking faucets, pipes, or other sources of water.   Clean moldy surfaces.  Dehumidify basement if it is damp and smelly.   Smoke, Strong Odors, and Sprays  These can reduce air quality. Stay away from strong odors and sprays, such as perfume, powder, hair spray, paints, smoke incense, paint, cleaning products, candles and new carpet.   Exercise or Sports  Some people with asthma have this trigger. Be active!  Ask your doctor about taking medicine before sports or exercise to prevent symptoms.    Warm up for 5-10 minutes before and after sports or exercise.     Other Triggers of Asthma  Cold air:  Cover your nose and mouth with a scarf.  Sometimes laughing or crying can be a trigger.  Some medicines and food can trigger asthma.

## 2020-11-12 LAB
CHOLEST SERPL-MCNC: 188 MG/DL
HDLC SERPL-MCNC: 51 MG/DL
LDLC SERPL CALC-MCNC: 124 MG/DL
NONHDLC SERPL-MCNC: 137 MG/DL
PSA SERPL-ACNC: 0.5 UG/L (ref 0–4)
TRIGL SERPL-MCNC: 65 MG/DL
TSH SERPL DL<=0.005 MIU/L-ACNC: 3.53 MU/L (ref 0.4–4)

## 2020-11-12 ASSESSMENT — ASTHMA QUESTIONNAIRES: ACT_TOTALSCORE: 25

## 2020-11-13 NOTE — RESULT ENCOUNTER NOTE
"The LDL \"bad\" cholesterol is mildly elevated.  A bit better than last year.  Keep working on healthy diet/exercise.    Other labs here are fine.    Nicolás Morejon MD  "

## 2020-12-13 ENCOUNTER — HEALTH MAINTENANCE LETTER (OUTPATIENT)
Age: 60
End: 2020-12-13

## 2021-01-28 DIAGNOSIS — J45.20 INTERMITTENT ASTHMA, UNCOMPLICATED: ICD-10-CM

## 2021-02-01 ENCOUNTER — MYC MEDICAL ADVICE (OUTPATIENT)
Dept: FAMILY MEDICINE | Facility: CLINIC | Age: 61
End: 2021-02-01

## 2021-05-18 ENCOUNTER — TELEPHONE (OUTPATIENT)
Dept: FAMILY MEDICINE | Facility: CLINIC | Age: 61
End: 2021-05-18

## 2021-05-18 NOTE — TELEPHONE ENCOUNTER
Patient Quality Outreach      Summary:    Patient has the following on his problem list/HM:   Immunizations     No immunizations due        Patient is due/failing the following:   Immunizations    Type of outreach:    patient had flu shot. I updated in chart    Questions for provider review:    None                                                                                                                                     Hemalatha Parikh [facility]       Chart routed to chart closed.

## 2021-05-19 ENCOUNTER — TRANSFERRED RECORDS (OUTPATIENT)
Dept: HEALTH INFORMATION MANAGEMENT | Facility: CLINIC | Age: 61
End: 2021-05-19

## 2021-09-26 ENCOUNTER — HEALTH MAINTENANCE LETTER (OUTPATIENT)
Age: 61
End: 2021-09-26

## 2021-09-27 ENCOUNTER — OFFICE VISIT (OUTPATIENT)
Dept: FAMILY MEDICINE | Facility: CLINIC | Age: 61
End: 2021-09-27
Payer: COMMERCIAL

## 2021-09-27 VITALS
SYSTOLIC BLOOD PRESSURE: 132 MMHG | OXYGEN SATURATION: 100 % | HEIGHT: 67 IN | HEART RATE: 64 BPM | TEMPERATURE: 97.8 F | DIASTOLIC BLOOD PRESSURE: 86 MMHG | BODY MASS INDEX: 23.9 KG/M2 | WEIGHT: 152.25 LBS

## 2021-09-27 DIAGNOSIS — Z00.00 ROUTINE GENERAL MEDICAL EXAMINATION AT A HEALTH CARE FACILITY: Primary | ICD-10-CM

## 2021-09-27 DIAGNOSIS — E78.5 HYPERLIPIDEMIA LDL GOAL <100: ICD-10-CM

## 2021-09-27 DIAGNOSIS — K21.9 GASTROESOPHAGEAL REFLUX DISEASE, UNSPECIFIED WHETHER ESOPHAGITIS PRESENT: ICD-10-CM

## 2021-09-27 DIAGNOSIS — R73.01 IMPAIRED FASTING GLUCOSE: ICD-10-CM

## 2021-09-27 DIAGNOSIS — Z12.5 SCREENING FOR PROSTATE CANCER: ICD-10-CM

## 2021-09-27 DIAGNOSIS — R53.83 FATIGUE, UNSPECIFIED TYPE: ICD-10-CM

## 2021-09-27 DIAGNOSIS — J45.20 INTERMITTENT ASTHMA, UNCOMPLICATED: ICD-10-CM

## 2021-09-27 LAB
ALBUMIN SERPL-MCNC: 4 G/DL (ref 3.4–5)
ALP SERPL-CCNC: 86 U/L (ref 40–150)
ALT SERPL W P-5'-P-CCNC: 24 U/L (ref 0–70)
ANION GAP SERPL CALCULATED.3IONS-SCNC: 2 MMOL/L (ref 3–14)
AST SERPL W P-5'-P-CCNC: 15 U/L (ref 0–45)
BASOPHILS # BLD AUTO: 0 10E3/UL (ref 0–0.2)
BASOPHILS NFR BLD AUTO: 0 %
BILIRUB SERPL-MCNC: 0.9 MG/DL (ref 0.2–1.3)
BUN SERPL-MCNC: 17 MG/DL (ref 7–30)
CALCIUM SERPL-MCNC: 9.5 MG/DL (ref 8.5–10.1)
CHLORIDE BLD-SCNC: 101 MMOL/L (ref 94–109)
CHOLEST SERPL-MCNC: 204 MG/DL
CO2 SERPL-SCNC: 33 MMOL/L (ref 20–32)
CREAT SERPL-MCNC: 1.09 MG/DL (ref 0.66–1.25)
EOSINOPHIL # BLD AUTO: 0.2 10E3/UL (ref 0–0.7)
EOSINOPHIL NFR BLD AUTO: 4 %
ERYTHROCYTE [DISTWIDTH] IN BLOOD BY AUTOMATED COUNT: 13.2 % (ref 10–15)
FASTING STATUS PATIENT QL REPORTED: YES
GFR SERPL CREATININE-BSD FRML MDRD: 72 ML/MIN/1.73M2
GLUCOSE BLD-MCNC: 107 MG/DL (ref 70–99)
HBA1C MFR BLD: 5.6 % (ref 0–5.6)
HCT VFR BLD AUTO: 42.9 % (ref 40–53)
HDLC SERPL-MCNC: 58 MG/DL
HGB BLD-MCNC: 14.8 G/DL (ref 13.3–17.7)
LDLC SERPL CALC-MCNC: 134 MG/DL
LYMPHOCYTES # BLD AUTO: 1.1 10E3/UL (ref 0.8–5.3)
LYMPHOCYTES NFR BLD AUTO: 24 %
MCH RBC QN AUTO: 32 PG (ref 26.5–33)
MCHC RBC AUTO-ENTMCNC: 34.5 G/DL (ref 31.5–36.5)
MCV RBC AUTO: 93 FL (ref 78–100)
MONOCYTES # BLD AUTO: 0.6 10E3/UL (ref 0–1.3)
MONOCYTES NFR BLD AUTO: 13 %
NEUTROPHILS # BLD AUTO: 2.8 10E3/UL (ref 1.6–8.3)
NEUTROPHILS NFR BLD AUTO: 59 %
NONHDLC SERPL-MCNC: 146 MG/DL
PLATELET # BLD AUTO: 171 10E3/UL (ref 150–450)
POTASSIUM BLD-SCNC: 4.2 MMOL/L (ref 3.4–5.3)
PROT SERPL-MCNC: 6.9 G/DL (ref 6.8–8.8)
PSA SERPL-MCNC: 0.45 UG/L (ref 0–4)
RBC # BLD AUTO: 4.62 10E6/UL (ref 4.4–5.9)
SODIUM SERPL-SCNC: 136 MMOL/L (ref 133–144)
TRIGL SERPL-MCNC: 62 MG/DL
WBC # BLD AUTO: 4.7 10E3/UL (ref 4–11)

## 2021-09-27 RX ORDER — ALBUTEROL SULFATE 90 UG/1
2 AEROSOL, METERED RESPIRATORY (INHALATION) 4 TIMES DAILY PRN
Qty: 18 G | Refills: 5 | Status: SHIPPED | OUTPATIENT
Start: 2021-09-27

## 2021-09-27 ASSESSMENT — ENCOUNTER SYMPTOMS
HEMATOCHEZIA: 0
JOINT SWELLING: 0
EYE PAIN: 0
FREQUENCY: 0
HEADACHES: 0
NAUSEA: 0
COUGH: 0
ARTHRALGIAS: 0
HEARTBURN: 0
NERVOUS/ANXIOUS: 0
PALPITATIONS: 0
PARESTHESIAS: 0
FEVER: 0
WEAKNESS: 0
DYSURIA: 0
SORE THROAT: 0
SHORTNESS OF BREATH: 0
CONSTIPATION: 0
HEMATURIA: 0
CHILLS: 0
DIZZINESS: 0
MYALGIAS: 0
DIARRHEA: 0
ABDOMINAL PAIN: 0

## 2021-09-27 ASSESSMENT — MIFFLIN-ST. JEOR: SCORE: 1441.84

## 2021-09-27 ASSESSMENT — PAIN SCALES - GENERAL: PAINLEVEL: NO PAIN (0)

## 2021-09-27 NOTE — PROGRESS NOTES
SUBJECTIVE:   CC: Jose Wise is an 61 year old male who presents for preventative health visit.        Patient has been advised of split billing requirements and indicates understanding: Yes  Healthy Habits:     Getting at least 3 servings of Calcium per day:  Yes    Bi-annual eye exam:  Yes    Dental care twice a year:  Yes    Sleep apnea or symptoms of sleep apnea:  None    Diet:  Regular (no restrictions)    Frequency of exercise:  2-3 days/week    Duration of exercise:  Less than 15 minutes    Taking medications regularly:  No    Medication side effects:  None    PHQ-2 Total Score: 0    Additional concerns today:  No               Today's PHQ-2 Score:   PHQ-2 ( 1999 [employer]) 9/27/2021   Q1: Little interest or pleasure in doing things 0   Q2: Feeling down, depressed or hopeless 0   PHQ-2 Score 0   Q1: Little interest or pleasure in doing things Not at all   Q2: Feeling down, depressed or hopeless Not at all   PHQ-2 Score 0       Abuse: Current or Past(Physical, Sexual or Emotional)- No  Do you feel safe in your environment? 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 Not applicable    Alcohol Use 9/27/2021   Prescreen: >3 drinks/day or >7 drinks/week? Not Applicable   Prescreen: >3 drinks/day or >7 drinks/week? -   No flowsheet data found.    Last PSA:   PSA   Date Value Ref Range Status   11/11/2020 0.50 0 - 4 ug/L Final     Comment:     Assay Method:  Chemiluminescence using Siemens Vista analyzer       Reviewed orders with patient. Reviewed health  "maintenance and updated orders accordingly - Yes       Reviewed and updated as needed this visit by clinical staff  Tobacco  Allergies  Meds   Med Hx  Surg Hx  Fam Hx  Soc Hx        Reviewed and updated as needed this visit by Provider                    Review of Systems   Constitutional: Negative for chills and fever.   HENT: Negative for congestion, ear pain, hearing loss and sore throat.    Eyes: Negative for pain and visual disturbance.   Respiratory: Negative for cough and shortness of breath.    Cardiovascular: Negative for chest pain, palpitations and peripheral edema.   Gastrointestinal: Negative for abdominal pain, constipation, diarrhea, heartburn, hematochezia and nausea.   Genitourinary: Negative for discharge, dysuria, frequency, genital sores, hematuria, impotence and urgency.   Musculoskeletal: Negative for arthralgias, joint swelling and myalgias.   Skin: Negative for rash.   Neurological: Negative for dizziness, weakness, headaches and paresthesias.   Psychiatric/Behavioral: Negative for mood changes. The patient is not nervous/anxious.       kids in good health    5 grandkids    Retired 2 years, getting hand of it    Rosacea started up again late summer    Hardly ever needs albuterol      Had colonoscopy may 2021, reviewed results in detail    Short nap during day sometimes        OBJECTIVE:   /86 (BP Location: Right arm, Patient Position: Chair, Cuff Size: Adult Regular)   Pulse 64   Temp 97.8  F (36.6  C) (Temporal)   Ht 1.69 m (5' 6.54\")   Wt 69.1 kg (152 lb 4 oz)   SpO2 100%   BMI 24.18 kg/m      Physical Exam  GENERAL: healthy, alert and no distress  EYES: Eyes grossly normal to inspection, PERRL and conjunctivae and sclerae normal  HENT: ear canals and TM's normal, nose and mouth without ulcers or lesions  NECK: no adenopathy, no asymmetry, masses, or scars and thyroid normal to palpation  RESP: lungs clear to auscultation - no rales, rhonchi or wheezes  CV: regular rate " and rhythm, normal S1 S2, no S3 or S4, no murmur, click or rub, no peripheral edema and peripheral pulses strong  ABDOMEN: soft, nontender, no hepatosplenomegaly, no masses and bowel sounds normal  MS: no gross musculoskeletal defects noted, no edema  SKIN: no suspicious lesions or rashes  NEURO: Normal strength and tone, mentation intact and speech normal  PSYCH: mentation appears normal, affect normal/bright    Diagnostic Test Results:  Labs reviewed in Epic    ASSESSMENT/PLAN:   Jose was seen today for physical and health maintenance.    Diagnoses and all orders for this visit:    Routine general medical examination at a health care facility    Intermittent asthma, uncomplicated  -     albuterol (VENTOLIN HFA) 108 (90 Base) MCG/ACT inhaler; Inhale 2 puffs into the lungs 4 times daily as needed for wheezing  -     mometasone (ASMANEX, 30 METERED DOSES,) 220 MCG/INH inhaler; USE 1 INHALATION DAILY    Gastroesophageal reflux disease, unspecified whether esophagitis present  -     omeprazole (PRILOSEC) 20 MG DR capsule; TAKE 1 CAPSULE BY MOUTH EVERY DAY    Screening for prostate cancer  -     Prostate Specific Antigen Screen; Future  -     Prostate Specific Antigen Screen    Hyperlipidemia LDL goal <100  -     Comprehensive metabolic panel; Future  -     Lipid panel reflex to direct LDL Fasting; Future  -     Lipid panel reflex to direct LDL Fasting  -     Comprehensive metabolic panel    Fatigue, unspecified type  -     CBC with Platelets & Differential; Future  -     CBC with Platelets & Differential    Impaired fasting glucose  -     Hemoglobin A1c; Future  -     Hemoglobin A1c    Other orders  -     REVIEW OF HEALTH MAINTENANCE PROTOCOL ORDERS    Discussed multiple issues with patient  Overall very stable  Refill meds  Keep working on healthy diet/exercise   Up to date on colonoscopy  Refill needed meds      Patient has been advised of split billing requirements and indicates understanding: Yes  COUNSELING:  "  Reviewed preventive health counseling, as reflected in patient instructions       Regular exercise       Healthy diet/nutrition       Vision screening       Colon cancer screening       Prostate cancer screening    Estimated body mass index is 24.18 kg/m  as calculated from the following:    Height as of this encounter: 1.69 m (5' 6.54\").    Weight as of this encounter: 69.1 kg (152 lb 4 oz).         He reports that he quit smoking about 15 years ago. His smoking use included cigarettes. He started smoking about 41 years ago. He has a 12.50 pack-year smoking history. He has never used smokeless tobacco.      Counseling Resources:  ATP IV Guidelines  Pooled Cohorts Equation Calculator  FRAX Risk Assessment  ICSI Preventive Guidelines  Dietary Guidelines for Americans, 2010  USDA's MyPlate  ASA Prophylaxis  Lung CA Screening    Nicolás Morejon MD  [facility]  "

## 2021-09-27 NOTE — PATIENT INSTRUCTIONS
Keep working on healthy diet/exercise     We will send you lab results    Stay on same medications

## 2021-09-28 ASSESSMENT — ASTHMA QUESTIONNAIRES: ACT_TOTALSCORE: 25

## 2021-09-28 NOTE — RESULT ENCOUNTER NOTE
Cholesterol mildly elevated; keep working on healthy diet/exercise     Other labs are fine    Normal hemoglobin a1c means no diabetes    The low anion gap is not worrisome ( only worrisome if high )    Nicolás Morejon MD

## 2022-01-26 ENCOUNTER — TELEPHONE (OUTPATIENT)
Dept: FAMILY MEDICINE | Facility: CLINIC | Age: 62
End: 2022-01-26
Payer: COMMERCIAL

## 2022-01-26 NOTE — TELEPHONE ENCOUNTER
Patient Quality Outreach    Patient is due for the following:   Immunizations  -  Influenza    NEXT STEPS:   flu shot pulled from [facility]    Type of outreach:    Chart review performed, no outreach needed.    Next Steps:  Reach out within 90 days via NA.    Max number of attempts reached: Yes. Will try again in 90 days if patient still on fail list.    Questions for provider review:    None     Hemalatha Parikh, [facility]  Chart routed to chart closed.

## 2022-06-30 ENCOUNTER — OFFICE VISIT (OUTPATIENT)
Dept: FAMILY MEDICINE | Facility: CLINIC | Age: 62
End: 2022-06-30
Payer: COMMERCIAL

## 2022-06-30 VITALS
HEIGHT: 67 IN | BODY MASS INDEX: 23.86 KG/M2 | HEART RATE: 68 BPM | TEMPERATURE: 97.6 F | OXYGEN SATURATION: 100 % | DIASTOLIC BLOOD PRESSURE: 85 MMHG | WEIGHT: 152 LBS | SYSTOLIC BLOOD PRESSURE: 137 MMHG

## 2022-06-30 DIAGNOSIS — K20.0 EOSINOPHILIC ESOPHAGITIS: ICD-10-CM

## 2022-06-30 DIAGNOSIS — K21.9 GASTROESOPHAGEAL REFLUX DISEASE, UNSPECIFIED WHETHER ESOPHAGITIS PRESENT: ICD-10-CM

## 2022-06-30 DIAGNOSIS — R05.9 COUGH: Primary | ICD-10-CM

## 2022-06-30 DIAGNOSIS — Z91.09 ENVIRONMENTAL ALLERGIES: ICD-10-CM

## 2022-06-30 PROCEDURE — 99214 OFFICE O/P EST MOD 30 MIN: CPT | Performed by: FAMILY MEDICINE

## 2022-06-30 RX ORDER — FAMOTIDINE 20 MG/1
20 TABLET, FILM COATED ORAL 2 TIMES DAILY
Qty: 60 TABLET | Refills: 5 | Status: SHIPPED | OUTPATIENT
Start: 2022-06-30 | End: 2022-11-14

## 2022-06-30 RX ORDER — LORATADINE 10 MG/1
10 TABLET ORAL DAILY
Qty: 30 TABLET | Refills: 5 | Status: SHIPPED | OUTPATIENT
Start: 2022-06-30 | End: 2022-10-17

## 2022-06-30 ASSESSMENT — ASTHMA QUESTIONNAIRES
QUESTION_1 LAST FOUR WEEKS HOW MUCH OF THE TIME DID YOUR ASTHMA KEEP YOU FROM GETTING AS MUCH DONE AT WORK, SCHOOL OR AT HOME: NONE OF THE TIME
ACT_TOTALSCORE: 25
QUESTION_3 LAST FOUR WEEKS HOW OFTEN DID YOUR ASTHMA SYMPTOMS (WHEEZING, COUGHING, SHORTNESS OF BREATH, CHEST TIGHTNESS OR PAIN) WAKE YOU UP AT NIGHT OR EARLIER THAN USUAL IN THE MORNING: NOT AT ALL
QUESTION_4 LAST FOUR WEEKS HOW OFTEN HAVE YOU USED YOUR RESCUE INHALER OR NEBULIZER MEDICATION (SUCH AS ALBUTEROL): NOT AT ALL
QUESTION_2 LAST FOUR WEEKS HOW OFTEN HAVE YOU HAD SHORTNESS OF BREATH: NOT AT ALL
QUESTION_5 LAST FOUR WEEKS HOW WOULD YOU RATE YOUR ASTHMA CONTROL: COMPLETELY CONTROLLED
ACT_TOTALSCORE: 25

## 2022-06-30 ASSESSMENT — ENCOUNTER SYMPTOMS: COUGH: 1

## 2022-06-30 ASSESSMENT — PAIN SCALES - GENERAL: PAINLEVEL: NO PAIN (0)

## 2022-06-30 NOTE — PROGRESS NOTES
"       Letty Garcia is a 62 year old, presenting for the following health issues:  Cough and Recheck Medication      Cough    History of Present Illness       Reason for visit:  On going cough  Symptom onset:  More than a month  Symptoms include:  Cough, allergy-like symptoms  Symptom intensity:  Mild  Symptom progression:  Staying the same  Had these symptoms before:  No  What makes it worse:  No  What makes it better:  Allergy meds    He eats 2-3 servings of fruits and vegetables daily.He consumes 1 sweetened beverage(s) daily.He exercises with enough effort to increase his heart rate 9 or less minutes per day.  He exercises with enough effort to increase his heart rate 3 or less days per week.   He is taking medications regularly.        Review of Systems   Respiratory: Positive for cough.       Persistent   Started March    Occasional phlegm    Clear/colorless    Some allergy pills, occasionally helps eyes/nose/cough    Runny  Nose may  Be  dripping  Down  back     No fever/chills    Occasional short of breath    Had covid shots x  3    Worse after eating    Bad in am    No change in acid    Still on omeprazole    Takes over the counter antihistamine chlorpheniramine    Getting a little less          Objective    BP (!) 144/94 (BP Location: Left arm, Patient Position: Chair, Cuff Size: Adult Regular)   Pulse 68   Temp 97.6  F (36.4  C) (Temporal)   Ht 1.69 m (5' 6.54\")   Wt 68.9 kg (152 lb)   SpO2 100%   BMI 24.14 kg/m    Body mass index is 24.14 kg/m .  Physical Exam  Constitutional:       Appearance: He is well-developed.   HENT:      Head: Normocephalic and atraumatic.   Eyes:      Conjunctiva/sclera: Conjunctivae normal.   Neck:      Vascular: No carotid bruit.   Cardiovascular:      Rate and Rhythm: Normal rate and regular rhythm.      Heart sounds: Normal heart sounds.   Pulmonary:      Effort: Pulmonary effort is normal. No respiratory distress.      Breath sounds: Normal breath sounds. "   Neurological:      Mental Status: He is alert and oriented to person, place, and time.      Cranial Nerves: No cranial nerve deficit.   Psychiatric:         Speech: Speech normal.         Behavior: Behavior normal.           cxr normal  No edema  Radials okay    .   ASSESSMENT / PLAN:  (R05.9) Cough  (primary encounter diagnosis)  Comment: xray and exam normal here ; no coughing  Plan: XR Chest 2 Views             (Z91.09) Environmental allergies  Comment: may be allergy contributor  Plan: loratadine (CLARITIN) 10 MG tablet        Trial of daily loratadine    (K21.9) Gastroesophageal reflux disease, unspecified whether esophagitis present  Comment: trial of h2 instead of ppi  Plan: famotidine (PEPCID) 20 MG tablet             easosinophilic esophagitis: this was seen on egd biopsy.  No precancer.  Another reason to consider the h1 and h2 blockers.      Follow up prn symptoms     Be seen promptly if symptoms acutely worsen     I reviewed the patient's medications, allergies, medical history, family history, and social history.    Nicolás Morejon MD                    .  ..

## 2022-06-30 NOTE — PATIENT INSTRUCTIONS
Stop the prilosec    Start the famotidine 20 mg 2x daily; could later go to once daily if symptoms well controlled    Start loratadine instead of the other antihistamine    Follow up as needed based on symptoms

## 2022-07-01 ENCOUNTER — APPOINTMENT (OUTPATIENT)
Dept: URBAN - METROPOLITAN AREA CLINIC 252 | Age: 62
Setting detail: DERMATOLOGY
End: 2022-07-01

## 2022-07-01 VITALS — WEIGHT: 152 LBS | HEIGHT: 67.5 IN

## 2022-07-01 DIAGNOSIS — L20.89 OTHER ATOPIC DERMATITIS: ICD-10-CM

## 2022-07-01 DIAGNOSIS — L21.8 OTHER SEBORRHEIC DERMATITIS: ICD-10-CM

## 2022-07-01 DIAGNOSIS — L72.8 OTHER FOLLICULAR CYSTS OF THE SKIN AND SUBCUTANEOUS TISSUE: ICD-10-CM

## 2022-07-01 DIAGNOSIS — L71.8 OTHER ROSACEA: ICD-10-CM

## 2022-07-01 DIAGNOSIS — L72.0 EPIDERMAL CYST: ICD-10-CM

## 2022-07-01 PROCEDURE — OTHER PRESCRIPTION MEDICATION MANAGEMENT: OTHER

## 2022-07-01 PROCEDURE — OTHER COUNSELING: OTHER

## 2022-07-01 PROCEDURE — 99204 OFFICE O/P NEW MOD 45 MIN: CPT

## 2022-07-01 PROCEDURE — OTHER PRESCRIPTION: OTHER

## 2022-07-01 RX ORDER — METRONIDAZOLE 7.5 MG/G
0.75% CREAM TOPICAL BID
Qty: 45 | Refills: 11 | Status: ERX | COMMUNITY
Start: 2022-07-01

## 2022-07-01 ASSESSMENT — LOCATION SIMPLE DESCRIPTION DERM
LOCATION SIMPLE: RIGHT FOREHEAD
LOCATION SIMPLE: RIGHT ANTERIOR NECK
LOCATION SIMPLE: LEFT CHEEK
LOCATION SIMPLE: LEFT TEMPLE
LOCATION SIMPLE: LEFT ANTERIOR NECK
LOCATION SIMPLE: RIGHT TEMPLE
LOCATION SIMPLE: RIGHT CHEEK
LOCATION SIMPLE: NOSE

## 2022-07-01 ASSESSMENT — LOCATION DETAILED DESCRIPTION DERM
LOCATION DETAILED: LEFT INFERIOR LATERAL NECK
LOCATION DETAILED: LEFT CENTRAL MALAR CHEEK
LOCATION DETAILED: RIGHT INFERIOR MEDIAL MALAR CHEEK
LOCATION DETAILED: RIGHT INFERIOR LATERAL NECK
LOCATION DETAILED: RIGHT INFERIOR TEMPLE
LOCATION DETAILED: RIGHT INFERIOR MEDIAL FOREHEAD
LOCATION DETAILED: NASAL DORSUM
LOCATION DETAILED: RIGHT CENTRAL MALAR CHEEK
LOCATION DETAILED: RIGHT LATERAL MALAR CHEEK
LOCATION DETAILED: RIGHT SUPERIOR LATERAL BUCCAL CHEEK
LOCATION DETAILED: LEFT INFERIOR TEMPLE

## 2022-07-01 ASSESSMENT — LOCATION ZONE DERM
LOCATION ZONE: FACE
LOCATION ZONE: NOSE
LOCATION ZONE: NECK

## 2022-07-01 NOTE — PROCEDURE: PRESCRIPTION MEDICATION MANAGEMENT
Continue Regimen: Continue TMC 0.1% BID PRN with flares, for no longer than 14 days per month. Pt should be treating until rash is resolved. Reviewed proper topical steroid use. Will refill TMC 0.1% as needed for up to one year. Start Eucerin Advanced Repair cream PRN for maintenance. Samples provided today.
Render In Strict Bullet Format?: No
Detail Level: Zone
Initiate Treatment: Start TMC 0.1% BID PRN with itchy flares, for no longer than 14 days per month.

## 2022-07-01 NOTE — HPI: RASH
Is This A New Presentation, Or A Follow-Up?: Rash
Additional History: Treated with triamcinolone 0.1% and this resolves the itch. Uses qd for generally 2 days then stops for 2 days.  Uses aftershave.
Additional History: Uses metronidazole and this helps prevent itch.  Use to use bid and not only using bid. Worsened back strong in may. Now using 4 days per week just because he was running out.

## 2022-09-20 ENCOUNTER — RX ONLY (RX ONLY)
Age: 62
End: 2022-09-20

## 2022-09-20 RX ORDER — KETOCONAZOLE 20 MG/G
CREAM TOPICAL
Qty: 30 | Refills: 2 | Status: ERX | COMMUNITY
Start: 2022-09-20

## 2022-10-11 ENCOUNTER — APPOINTMENT (OUTPATIENT)
Dept: URBAN - METROPOLITAN AREA CLINIC 252 | Age: 62
Setting detail: DERMATOLOGY
End: 2022-10-12

## 2022-10-11 VITALS — HEIGHT: 60 IN | WEIGHT: 141 LBS

## 2022-10-11 DIAGNOSIS — L20.89 OTHER ATOPIC DERMATITIS: ICD-10-CM

## 2022-10-11 DIAGNOSIS — L71.8 OTHER ROSACEA: ICD-10-CM

## 2022-10-11 PROCEDURE — OTHER PRESCRIPTION: OTHER

## 2022-10-11 PROCEDURE — 99214 OFFICE O/P EST MOD 30 MIN: CPT | Mod: 25

## 2022-10-11 PROCEDURE — OTHER INTRALESIONAL KENALOG: OTHER

## 2022-10-11 PROCEDURE — OTHER COUNSELING: OTHER

## 2022-10-11 PROCEDURE — OTHER MIPS QUALITY: OTHER

## 2022-10-11 PROCEDURE — 11900 INJECT SKIN LESIONS </W 7: CPT

## 2022-10-11 RX ORDER — TACROLIMUS 1 MG/G
0.1% OINTMENT TOPICAL BID
Qty: 30 | Refills: 2 | Status: ERX | COMMUNITY
Start: 2022-10-11

## 2022-10-11 ASSESSMENT — LOCATION DETAILED DESCRIPTION DERM
LOCATION DETAILED: RIGHT CENTRAL MALAR CHEEK
LOCATION DETAILED: LEFT CENTRAL MALAR CHEEK
LOCATION DETAILED: NASAL DORSUM
LOCATION DETAILED: RIGHT INFERIOR LATERAL NECK

## 2022-10-11 ASSESSMENT — LOCATION ZONE DERM
LOCATION ZONE: NOSE
LOCATION ZONE: NECK
LOCATION ZONE: FACE

## 2022-10-11 ASSESSMENT — LOCATION SIMPLE DESCRIPTION DERM
LOCATION SIMPLE: RIGHT CHEEK
LOCATION SIMPLE: LEFT CHEEK
LOCATION SIMPLE: NOSE
LOCATION SIMPLE: RIGHT ANTERIOR NECK

## 2022-10-11 NOTE — HPI: RASH (ROSACEA)
Is This A New Presentation, Or A Follow-Up?: Rosacea
Additional History: Was doing well with metronidazole 0.75% bid and stopped 2 weeks ago birth control he was doing week

## 2022-10-11 NOTE — HPI: RASH
Is This A New Presentation, Or A Follow-Up?: Rash
Additional History: Used triamcinolone 0.1% on neck and was gone after 5-7 days and used for 14 days.  Ketonpconazole 2% does not help. Uses eucerin q week.

## 2022-10-11 NOTE — PROCEDURE: INTRALESIONAL KENALOG
Consent: - Verbal and written consent was obtained prior to injection today. \\n- Discussed the potential risks and benefits of intralesional Kenalog injections including but not limited to risk of skin atrophy/indentation, unsuccessful treatment, and recurrence.\\n- Patient expressed understanding, and the consent form was signed prior to the injection(s) today. 3.31

## 2022-10-17 DIAGNOSIS — Z91.09 ENVIRONMENTAL ALLERGIES: ICD-10-CM

## 2022-10-17 RX ORDER — LORATADINE 10 MG/1
TABLET ORAL
Qty: 90 TABLET | Refills: 1 | Status: SHIPPED | OUTPATIENT
Start: 2022-10-17 | End: 2022-11-14

## 2022-11-07 ASSESSMENT — ENCOUNTER SYMPTOMS
NAUSEA: 0
ARTHRALGIAS: 1
PARESTHESIAS: 0
EYE PAIN: 0
CONSTIPATION: 0
MYALGIAS: 0
SHORTNESS OF BREATH: 0
DIARRHEA: 0
HEMATURIA: 0
SORE THROAT: 0
FEVER: 0
WEAKNESS: 0
JOINT SWELLING: 0
HEMATOCHEZIA: 0
DIZZINESS: 0
CHILLS: 0
ABDOMINAL PAIN: 0
HEARTBURN: 0
NERVOUS/ANXIOUS: 0
PALPITATIONS: 0
HEADACHES: 0
COUGH: 0
DYSURIA: 0
FREQUENCY: 0

## 2022-11-11 ENCOUNTER — LAB (OUTPATIENT)
Dept: LAB | Facility: CLINIC | Age: 62
End: 2022-11-11
Payer: COMMERCIAL

## 2022-11-11 DIAGNOSIS — Z12.5 SCREENING FOR PROSTATE CANCER: ICD-10-CM

## 2022-11-11 DIAGNOSIS — R53.83 FATIGUE, UNSPECIFIED TYPE: ICD-10-CM

## 2022-11-11 DIAGNOSIS — E78.5 HYPERLIPIDEMIA LDL GOAL <100: ICD-10-CM

## 2022-11-11 DIAGNOSIS — R73.01 IMPAIRED FASTING GLUCOSE: ICD-10-CM

## 2022-11-11 LAB
ALBUMIN SERPL-MCNC: 4 G/DL (ref 3.4–5)
ALP SERPL-CCNC: 75 U/L (ref 40–150)
ALT SERPL W P-5'-P-CCNC: 27 U/L (ref 0–70)
ANION GAP SERPL CALCULATED.3IONS-SCNC: 4 MMOL/L (ref 3–14)
AST SERPL W P-5'-P-CCNC: 17 U/L (ref 0–45)
BASOPHILS # BLD AUTO: 0 10E3/UL (ref 0–0.2)
BASOPHILS NFR BLD AUTO: 1 %
BILIRUB SERPL-MCNC: 0.7 MG/DL (ref 0.2–1.3)
BUN SERPL-MCNC: 15 MG/DL (ref 7–30)
CALCIUM SERPL-MCNC: 9 MG/DL (ref 8.5–10.1)
CHLORIDE BLD-SCNC: 104 MMOL/L (ref 94–109)
CHOLEST SERPL-MCNC: 219 MG/DL
CO2 SERPL-SCNC: 29 MMOL/L (ref 20–32)
CREAT SERPL-MCNC: 1.09 MG/DL (ref 0.66–1.25)
EOSINOPHIL # BLD AUTO: 0.3 10E3/UL (ref 0–0.7)
EOSINOPHIL NFR BLD AUTO: 5 %
ERYTHROCYTE [DISTWIDTH] IN BLOOD BY AUTOMATED COUNT: 13.3 % (ref 10–15)
FASTING STATUS PATIENT QL REPORTED: YES
GFR SERPL CREATININE-BSD FRML MDRD: 76 ML/MIN/1.73M2
GLUCOSE BLD-MCNC: 114 MG/DL (ref 70–99)
HBA1C MFR BLD: 5.7 % (ref 0–5.6)
HCT VFR BLD AUTO: 44.4 % (ref 40–53)
HDLC SERPL-MCNC: 57 MG/DL
HGB BLD-MCNC: 15.3 G/DL (ref 13.3–17.7)
LDLC SERPL CALC-MCNC: 144 MG/DL
LYMPHOCYTES # BLD AUTO: 1.5 10E3/UL (ref 0.8–5.3)
LYMPHOCYTES NFR BLD AUTO: 27 %
MCH RBC QN AUTO: 32.2 PG (ref 26.5–33)
MCHC RBC AUTO-ENTMCNC: 34.5 G/DL (ref 31.5–36.5)
MCV RBC AUTO: 94 FL (ref 78–100)
MONOCYTES # BLD AUTO: 0.7 10E3/UL (ref 0–1.3)
MONOCYTES NFR BLD AUTO: 12 %
NEUTROPHILS # BLD AUTO: 3 10E3/UL (ref 1.6–8.3)
NEUTROPHILS NFR BLD AUTO: 56 %
NONHDLC SERPL-MCNC: 162 MG/DL
PLATELET # BLD AUTO: 191 10E3/UL (ref 150–450)
POTASSIUM BLD-SCNC: 4.2 MMOL/L (ref 3.4–5.3)
PROT SERPL-MCNC: 7.2 G/DL (ref 6.8–8.8)
PSA SERPL-MCNC: 0.57 UG/L (ref 0–4)
RBC # BLD AUTO: 4.75 10E6/UL (ref 4.4–5.9)
SODIUM SERPL-SCNC: 137 MMOL/L (ref 133–144)
T4 FREE SERPL-MCNC: 0.82 NG/DL (ref 0.76–1.46)
TRIGL SERPL-MCNC: 91 MG/DL
TSH SERPL DL<=0.005 MIU/L-ACNC: 4.65 MU/L (ref 0.4–4)
WBC # BLD AUTO: 5.4 10E3/UL (ref 4–11)

## 2022-11-11 PROCEDURE — 80061 LIPID PANEL: CPT

## 2022-11-11 PROCEDURE — 80050 GENERAL HEALTH PANEL: CPT

## 2022-11-11 PROCEDURE — 83036 HEMOGLOBIN GLYCOSYLATED A1C: CPT

## 2022-11-11 PROCEDURE — 36415 COLL VENOUS BLD VENIPUNCTURE: CPT

## 2022-11-11 PROCEDURE — 84439 ASSAY OF FREE THYROXINE: CPT

## 2022-11-11 PROCEDURE — G0103 PSA SCREENING: HCPCS

## 2022-11-14 ENCOUNTER — OFFICE VISIT (OUTPATIENT)
Dept: FAMILY MEDICINE | Facility: CLINIC | Age: 62
End: 2022-11-14
Payer: COMMERCIAL

## 2022-11-14 VITALS
HEART RATE: 90 BPM | TEMPERATURE: 97.2 F | SYSTOLIC BLOOD PRESSURE: 121 MMHG | OXYGEN SATURATION: 99 % | BODY MASS INDEX: 24.95 KG/M2 | DIASTOLIC BLOOD PRESSURE: 85 MMHG | HEIGHT: 66 IN | WEIGHT: 155.25 LBS

## 2022-11-14 DIAGNOSIS — Z00.00 ROUTINE GENERAL MEDICAL EXAMINATION AT A HEALTH CARE FACILITY: Primary | ICD-10-CM

## 2022-11-14 DIAGNOSIS — K21.9 GASTROESOPHAGEAL REFLUX DISEASE, UNSPECIFIED WHETHER ESOPHAGITIS PRESENT: ICD-10-CM

## 2022-11-14 DIAGNOSIS — M21.372 LEFT FOOT DROP: ICD-10-CM

## 2022-11-14 DIAGNOSIS — R29.898 LEFT LEG WEAKNESS: ICD-10-CM

## 2022-11-14 PROCEDURE — 99396 PREV VISIT EST AGE 40-64: CPT | Performed by: FAMILY MEDICINE

## 2022-11-14 RX ORDER — FAMOTIDINE 20 MG/1
20 TABLET, FILM COATED ORAL 2 TIMES DAILY
Qty: 180 TABLET | Refills: 3 | Status: SHIPPED | OUTPATIENT
Start: 2022-11-14 | End: 2023-10-30

## 2022-11-14 RX ORDER — TACROLIMUS 1 MG/G
OINTMENT TOPICAL
COMMUNITY
Start: 2022-10-11

## 2022-11-14 ASSESSMENT — ENCOUNTER SYMPTOMS
ABDOMINAL PAIN: 0
JOINT SWELLING: 0
NERVOUS/ANXIOUS: 0
NAUSEA: 0
SORE THROAT: 0
DIZZINESS: 0
CHILLS: 0
HEMATOCHEZIA: 0
FEVER: 0
PALPITATIONS: 0
WEAKNESS: 0
ARTHRALGIAS: 1
MYALGIAS: 0
HEARTBURN: 0
DYSURIA: 0
HEADACHES: 0
CONSTIPATION: 0
FREQUENCY: 0
SHORTNESS OF BREATH: 0
EYE PAIN: 0
HEMATURIA: 0
COUGH: 0
DIARRHEA: 0
PARESTHESIAS: 0

## 2022-11-14 ASSESSMENT — PAIN SCALES - GENERAL: PAINLEVEL: NO PAIN (0)

## 2022-11-14 NOTE — PATIENT INSTRUCTIONS
See physical therapy    Try to increase walking/ weights / bands as able    Continue meds as is    Be seen promptly if symptoms acutely worsen    If you need asmanex refill let us know                Preventive Health Recommendations  Male Ages 50 - 64    Yearly exam:             See your health care provider every year in order to  o   Review health changes.   o   Discuss preventive care.    o   Review your medicines if your doctor has prescribed any.   Have a cholesterol test every 5 years, or more frequently if you are at risk for high cholesterol/heart disease.   Have a diabetes test (fasting glucose) every three years. If you are at risk for diabetes, you should have this test more often.   Have a colonoscopy at age 50, or have a yearly FIT test (stool test). These exams will check for colon cancer.    Talk with your health care provider about whether or not a prostate cancer screening test (PSA) is right for you.  You should be tested each year for STDs (sexually transmitted diseases), if you re at risk.     Shots: Get a flu shot each year. Get a tetanus shot every 10 years.     Nutrition:  Eat at least 5 servings of fruits and vegetables daily.   Eat whole-grain bread, whole-wheat pasta and brown rice instead of white grains and rice.   Get adequate Calcium and Vitamin D.     Lifestyle  Exercise for at least 150 minutes a week (30 minutes a day, 5 days a week). This will help you control your weight and prevent disease.   Limit alcohol to one drink per day.   No smoking.   Wear sunscreen to prevent skin cancer.   See your dentist every six months for an exam and cleaning.   See your eye doctor every 1 to 2 years.

## 2022-11-14 NOTE — LETTER
My Asthma Action Plan    Name: Jose Wise   YOB: 1960  Date: 11/14/2022   My doctor: Nicolás Morejon MD   My clinic: [facility]        My Rescue Medicine:   Albuterol inhaler (Proair/Ventolin/Proventil HFA)  2-4 puffs EVERY 4 HOURS as needed. Use a spacer if recommended by your provider.   My Asthma Severity:   Intermittent / Exercise Induced  Know your asthma triggers: see list             GREEN ZONE   Good Control    I feel good    No cough or wheeze    Can work, sleep and play without asthma symptoms       Take your asthma control medicine every day.     1. If exercise triggers your asthma, take your rescue medication    15 minutes before exercise or sports, and    During exercise if you have asthma symptoms  2. Spacer to use with inhaler: If you have a spacer, make sure to use it with your inhaler             YELLOW ZONE Getting Worse  I have ANY of these:    I do not feel good    Cough or wheeze    Chest feels tight    Wake up at night   1. Keep taking your Green Zone medications  2. Start taking your rescue medicine:    every 20 minutes for up to 1 hour. Then every 4 hours for 24-48 hours.  3. If you stay in the Yellow Zone for more than 12-24 hours, contact your doctor.  4. If you do not return to the Green Zone in 12-24 hours or you get worse, start taking your oral steroid medicine if prescribed by your provider.           RED ZONE Medical Alert - Get Help  I have ANY of these:    I feel awful    Medicine is not helping    Breathing getting harder    Trouble walking or talking    Nose opens wide to breathe       1. Take your rescue medicine NOW  2. If your provider has prescribed an oral steroid medicine, start taking it NOW  3. Call your doctor NOW  4. If you are still in the Red Zone after 20 minutes and you have not reached your doctor:    Take your rescue medicine again and    Call 911 or go to the emergency room right away    See your regular doctor within  2 weeks of an Emergency Room or Urgent Care visit for follow-up treatment.          Annual Reminders:  Meet with Asthma Educator,  Flu Shot in the Fall, consider Pneumonia Vaccination for patients with asthma (aged 19 and older).    Pharmacy:    [facility]  [id number] - [place] - [address]  [facility] - [place], [place] - [address] [facility] [place] [address] [facility] [place] [address] [id number] - [place], [place] [address]  [facility] - [facility] [address]  [id number] - [facility] [address]    Electronically signed by Nicolás Morejon MD   Date: 11/14/22                    Asthma Triggers  How To Control Things That Make Your Asthma Worse    Triggers are things that make your asthma worse.  Look at the list below to help you find your triggers and   what you can do about them. You can help prevent asthma flare-ups by staying away from your triggers.      Trigger                                                          What you can do   Cigarette Smoke  Tobacco smoke can make asthma worse. Do not allow smoking in your home, car or around you.  Be sure no one smokes at a child s day care or school.  If you smoke, ask your health care provider for ways to help you quit.  Ask family members to quit too.  Ask your health care provider for a referral to Quit Plan to help you quit smoking, or call 6-310-870-PLAN.     Colds, Flu, Bronchitis  These are common triggers of asthma. Wash your hands often.  Don t touch your eyes, nose or mouth.  Get a flu shot every year.     Dust Mites  These are tiny bugs that live in cloth or carpet. They are too small to see. Wash sheets and blankets in hot water every week.   Encase pillows and mattress in dust mite proof  covers.  Avoid having carpet if you can. If you have carpet, vacuum weekly.   Use a dust mask and HEPA vacuum.   Pollen and Outdoor Mold  Some people are allergic to trees, grass, or weed pollen, or molds. Try to keep your windows closed.  Limit time out doors when pollen count is high.   Ask you health care provider about taking medicine during allergy season.     Animal Dander  Some people are allergic to skin flakes, urine or saliva from pets with fur or feathers. Keep pets with fur or feathers out of your home.    If you can t keep the pet outdoors, then keep the pet out of your bedroom.  Keep the bedroom door closed.  Keep pets off cloth furniture and away from stuffed toys.     Mice, Rats, and Cockroaches  Some people are allergic to the waste from these pests.   Cover food and garbage.  Clean up spills and food crumbs.  Store grease in the refrigerator.   Keep food out of the bedroom.   Indoor Mold  This can be a trigger if your home has high moisture. Fix leaking faucets, pipes, or other sources of water.   Clean moldy surfaces.  Dehumidify basement if it is damp and smelly.   Smoke, Strong Odors, and Sprays  These can reduce air quality. Stay away from strong odors and sprays, such as perfume, powder, hair spray, paints, smoke incense, paint, cleaning products, candles and new carpet.   Exercise or Sports  Some people with asthma have this trigger. Be active!  Ask your doctor about taking medicine before sports or exercise to prevent symptoms.    Warm up for 5-10 minutes before and after sports or exercise.     Other Triggers of Asthma  Cold air:  Cover your nose and mouth with a scarf.  Sometimes laughing or crying can be a trigger.  Some medicines and food can trigger asthma.

## 2023-01-04 ENCOUNTER — THERAPY VISIT (OUTPATIENT)
Dept: PHYSICAL THERAPY | Facility: CLINIC | Age: 63
End: 2023-01-04
Attending: FAMILY MEDICINE
Payer: COMMERCIAL

## 2023-01-04 DIAGNOSIS — R29.898 LEFT LEG WEAKNESS: Primary | ICD-10-CM

## 2023-01-04 DIAGNOSIS — M21.372 LEFT FOOT DROP: ICD-10-CM

## 2023-01-04 PROCEDURE — 97110 THERAPEUTIC EXERCISES: CPT | Mod: GP | Performed by: PHYSICAL THERAPIST

## 2023-01-04 PROCEDURE — 97161 PT EVAL LOW COMPLEX 20 MIN: CPT | Mod: GP | Performed by: PHYSICAL THERAPIST

## 2023-01-04 NOTE — PROGRESS NOTES
01/04/23 0700   Quick Adds   Quick Adds Certification   Type of Visit Initial OP PT Evaluation   General Information   Start of Care Date 01/04/23   Referring Physician Nicolás Morejon MD   Orders Evaluate and Treat as Indicated   Order Date 11/14/22   Medical Diagnosis L foot drop, L foot weakness   Onset of illness/injury or Date of Surgery 07/01/22   Surgical/Medical history reviewed Yes  (GERD, asthma, malignant neoplasm of tonsillar fossa)   Pertinent history of current problem (include personal factors and/or comorbidities that impact the POC) Hx of L leg weakness and foot drop. MRI and EMG were normal ([facility], could not find records in [facility]). Saw Chiropractor in July and thought a pinched nerve, 2 weeks of treatment and this did seem to improve things. He also had stubbed L toe around this time. Cannot stand on one foot on the L side, without shoes toe will catch and get stuck under, cannot extend the toes or foot, cramps in the foot and HS, tingling in the leg at times most of the time. Overall strength is better but he is still weak through L side.   Pertinent Visual History  wears glasses   Prior level of function comment Doing some stretching for low back; has not been working L leg very hard; walking most days for 1.5 miles (was in [place] prior to this so did more walking outside)   Living environment House/townhome   Assistive Devices Comments does not use   Patient/Family Goals Statement improve his L leg strength   Fall Risk Screen   Fall screen completed by PT   Have you fallen 2 or more times in the past year? No   Have you fallen and had an injury in the past year? No   Timed Up and Go score (seconds) not tested   Is patient a fall risk? No   Pain   Pain comments mild back pain, feels like L foot and HS like to cramp   Cognitive Status Examination   Orientation orientation to person, place and time   Level of Consciousness alert   Follows Commands and Answers Questions 100% of the time    Personal Safety and Judgment intact   Memory intact   Posture   Posture Comments mild forward shoulder posture   Range of Motion (ROM)   ROM Comment gastroc and soleus tightness through LLE, GT extension L to 30 degrees with joint and muscle stiffness appreciated   Strength   Strength Comments 3/5  DF, 3/5 eversion, 4/5 inversion, 2+/5 GT, 4/5 L hip ABD, 4/5 L glute   Musculoskeletal Special Tests   Musculoskeletal Special Tests (+) SLR test on LLE   Bed Mobility   Bed Mobility Comments IND   Transfer Skills   Transfer Comments sit to stand IND   Gait   Gait Comments ambulates with steady gait wearing shoes, no major foot drop noted but shoes were more stiff in nature, decreased push off through LLE   Balance   Balance Comments decreased time in SLS on L foot, see below   Balance Special Tests   Balance Special Tests Single leg stance right;Single leg stance left   Balance Special Tests Single Leg Stance Right,   Right, seconds 10 Seconds   Balance Special Tests Single Leg Stance Left   Left, seconds 3 Seconds   Comments ankle instability during performance   Sensory Examination   Sensory Perception Comments numbness in L toe, notices tingling in L foot most of the time   Planned Therapy Interventions   Planned Therapy Interventions IADL retraining;balance training;gait training;neuromuscular re-education;ROM;strengthening;stretching   Clinical Impression   Criteria for Skilled Therapeutic Interventions Met yes, treatment indicated   PT Diagnosis LLE weakness   Influenced by the following impairments decreased LLE strength, foot drop with gait, pain and cramping through LLE   Functional limitations due to impairments decrease ease of mobility, decreased walking tolerance and tripping   Clinical Presentation Stable/Uncomplicated   Clinical Presentation Rationale stable medically, PT impairments, clinical judgement   Clinical Decision Making (Complexity) Low complexity   Therapy Frequency 1 time/week   Predicted  Duration of Therapy Intervention (days/wks) up to 6-8 weeks decreasing frequency as able   Risk & Benefits of therapy have been explained Yes   Patient, Family & other staff in agreement with plan of care Yes   Clinical Impression Comments Patient presents with hx of LLE weakness since July 2022. Imaging performed recently (MRI and EMG) were negative for any pathology but s/s consistent pinched nerve in low back in the summer. Focused today on strengthening LLE since pain has improved and EMG was normal. Will benefit from skilled PT to improve LLE function.   GOALS   PT Eval Goals 1;2;3   Goal 1   Goal Identifier ankle strength   Goal Description Jose will improve ankle DF and eversion to 4/5 or > in order to show less foot drop when walking and improved stability through this side.   Target Date 03/04/23   Goal 2   Goal Identifier single leg stance   Goal Description Jose will improve SLS time on LLE from 3 secs to 8 or > in order to improve his stability on his feet.   Target Date 03/04/23   Goal 3   Goal Identifier HEP   Goal Description Jose will participate in daily HEP for LLE strengthening and lengthening order to improve his balance, strength and confidence with mobility.   Target Date 03/04/23   Total Evaluation Time   PT Janusz Low Complexity Minutes ([id number]) 25

## 2023-01-20 ENCOUNTER — APPOINTMENT (OUTPATIENT)
Dept: URBAN - METROPOLITAN AREA CLINIC 252 | Age: 63
Setting detail: DERMATOLOGY
End: 2023-01-20

## 2023-01-20 VITALS — HEIGHT: 66.5 IN | WEIGHT: 150 LBS

## 2023-01-20 DIAGNOSIS — L71.8 OTHER ROSACEA: ICD-10-CM

## 2023-01-20 PROCEDURE — 99214 OFFICE O/P EST MOD 30 MIN: CPT

## 2023-01-20 PROCEDURE — OTHER PRESCRIPTION: OTHER

## 2023-01-20 PROCEDURE — OTHER COUNSELING: OTHER

## 2023-01-20 RX ORDER — DOXYCYCLINE 50 MG/1
50 MG CAPSULE ORAL BID
Qty: 60 | Refills: 0 | Status: ERX | COMMUNITY
Start: 2023-01-20

## 2023-01-20 RX ORDER — METRONIDAZOLE 7.5 MG/G
0.75% CREAM TOPICAL BID
Qty: 45 | Refills: 11 | Status: ERX | COMMUNITY
Start: 2023-01-20

## 2023-01-20 ASSESSMENT — LOCATION SIMPLE DESCRIPTION DERM
LOCATION SIMPLE: RIGHT CHEEK
LOCATION SIMPLE: LEFT CHEEK

## 2023-01-20 ASSESSMENT — LOCATION DETAILED DESCRIPTION DERM
LOCATION DETAILED: RIGHT SUPERIOR MEDIAL MALAR CHEEK
LOCATION DETAILED: LEFT MEDIAL MALAR CHEEK

## 2023-01-20 ASSESSMENT — LOCATION ZONE DERM: LOCATION ZONE: FACE

## 2023-01-20 NOTE — PROCEDURE: COUNSELING
Detail Level: Simple
Patient Specific Counseling (Will Not Stick From Patient To Patient): ** Discussed treatment options of topical only vs. topical with oral abx. Advised metronidazole cream is for maintenance, and is to be used for as long as he would like to treat problem. Reassured lesions appear benign today, as appear like pustules from a rosacea flare. Rec a probiotic when taking doxycyline. D/c doxycyline as soon as clear, and will try to maintain with topical only.
Sunscreen Recommendations: Rec continuing sunscreen daily

## 2023-01-20 NOTE — HPI: SKIN LESION
How Severe Is Your Skin Lesion?: moderate
Is This A New Presentation, Or A Follow-Up?: Skin Lesion
Additional History: Has not tried anything yet. Briones not recall he last time he used the metronidazole.

## 2023-01-25 ENCOUNTER — THERAPY VISIT (OUTPATIENT)
Dept: PHYSICAL THERAPY | Facility: CLINIC | Age: 63
End: 2023-01-25
Payer: COMMERCIAL

## 2023-01-25 DIAGNOSIS — R29.898 LEFT LEG WEAKNESS: Primary | ICD-10-CM

## 2023-01-25 PROCEDURE — 97110 THERAPEUTIC EXERCISES: CPT | Mod: GP | Performed by: PHYSICAL THERAPIST

## 2023-01-25 PROCEDURE — 97112 NEUROMUSCULAR REEDUCATION: CPT | Mod: GP | Performed by: PHYSICAL THERAPIST

## 2023-02-01 ENCOUNTER — THERAPY VISIT (OUTPATIENT)
Dept: PHYSICAL THERAPY | Facility: CLINIC | Age: 63
End: 2023-02-01
Payer: COMMERCIAL

## 2023-02-01 DIAGNOSIS — R29.898 LEFT LEG WEAKNESS: Primary | ICD-10-CM

## 2023-02-01 PROCEDURE — 97110 THERAPEUTIC EXERCISES: CPT | Mod: GP | Performed by: PHYSICAL THERAPIST

## 2023-02-01 PROCEDURE — 97112 NEUROMUSCULAR REEDUCATION: CPT | Mod: GP | Performed by: PHYSICAL THERAPIST

## 2023-02-01 NOTE — PROGRESS NOTES
[facility]    Outpatient Physical Therapy Discharge Note  Patient: Jose Wise  : 1960    Beginning/End Dates of Reporting Period:  23 to 23    Referring Provider: Sydney Morejon MD    Therapy Diagnosis: LLE weakness     Client Self Report: Reports plantar foot pain is annoying before bed but doesnt impact day. Confident in HEP, plans to return to pickleball with greater confidence. Has not yet trialed lift but plans to order one for increased lift into DF with recreational activities. Moving to [place] next week for a few months; states will return this summer if has continued need.                      Goals:  Goal Identifier ankle strength   Goal Description Jose will improve ankle DF and eversion to 4/5 or > in order to show less foot drop when walking and improved stability through this side.   Target Date 23   Date Met      Progress (detail required for progress note): minimal progress, not likely to see change     Goal Identifier single leg stance   Goal Description Jose will improve SLS time on LLE from 3 secs to 8 or > in order to improve his stability on his feet.   Target Date     Date Met  23   Progress (detail required for progress note): goal met with 16 seconds     Goal Identifier HEP   Goal Description Jose will participate in daily HEP for LLE strengthening and lengthening order to improve his balance, strength and confidence with mobility.   Target Date     Date Met  23   Progress (detail required for progress note): met         Plan:  Discharge from therapy.    Discharge:    Reason for Discharge: Patient has met all goals. Moving out of the state.      Discharge Plan: Patient to continue home program.

## 2023-04-23 ENCOUNTER — HEALTH MAINTENANCE LETTER (OUTPATIENT)
Age: 63
End: 2023-04-23

## 2023-06-19 ENCOUNTER — RX ONLY (RX ONLY)
Age: 63
End: 2023-06-19

## 2023-06-19 RX ORDER — METRONIDAZOLE 7.5 MG/G
CREAM TOPICAL
Qty: 45 | Refills: 5 | Status: ERX | COMMUNITY
Start: 2023-06-19

## 2023-08-18 NOTE — TELEPHONE ENCOUNTER
I tried to call patient with ultrasound result.  Patient not available.    I did my chart result note.      Result is normal.     Follow up prn symptoms.    Nicolás Morejon MD    
pacu, floor

## 2023-10-25 ASSESSMENT — ENCOUNTER SYMPTOMS
DIARRHEA: 0
NAUSEA: 0
FEVER: 0
NERVOUS/ANXIOUS: 0
MYALGIAS: 0
PALPITATIONS: 0
PARESTHESIAS: 0
CONSTIPATION: 0
FREQUENCY: 0
EYE PAIN: 0
DYSURIA: 0
SHORTNESS OF BREATH: 1
HEMATOCHEZIA: 0
JOINT SWELLING: 0
HEARTBURN: 0
ABDOMINAL PAIN: 0
DIZZINESS: 0
HEADACHES: 1
WEAKNESS: 0
CHILLS: 0
SORE THROAT: 0
HEMATURIA: 0
COUGH: 0
ARTHRALGIAS: 1

## 2023-10-25 ASSESSMENT — ASTHMA QUESTIONNAIRES: ACT_TOTALSCORE: 22

## 2023-10-27 ENCOUNTER — LAB (OUTPATIENT)
Dept: LAB | Facility: CLINIC | Age: 63
End: 2023-10-27
Payer: COMMERCIAL

## 2023-10-27 DIAGNOSIS — R73.01 IMPAIRED FASTING GLUCOSE: ICD-10-CM

## 2023-10-27 DIAGNOSIS — R53.83 FATIGUE, UNSPECIFIED TYPE: ICD-10-CM

## 2023-10-27 DIAGNOSIS — E78.5 HYPERLIPIDEMIA LDL GOAL <100: ICD-10-CM

## 2023-10-27 LAB
ALBUMIN SERPL BCG-MCNC: 4.4 G/DL (ref 3.5–5.2)
ALP SERPL-CCNC: 66 U/L (ref 40–129)
ALT SERPL W P-5'-P-CCNC: 15 U/L (ref 0–70)
ANION GAP SERPL CALCULATED.3IONS-SCNC: 11 MMOL/L (ref 7–15)
AST SERPL W P-5'-P-CCNC: 23 U/L (ref 0–45)
BASOPHILS # BLD AUTO: 0.1 10E3/UL (ref 0–0.2)
BASOPHILS NFR BLD AUTO: 1 %
BILIRUB SERPL-MCNC: 0.7 MG/DL
BUN SERPL-MCNC: 17.6 MG/DL (ref 8–23)
CALCIUM SERPL-MCNC: 9.8 MG/DL (ref 8.8–10.2)
CHLORIDE SERPL-SCNC: 101 MMOL/L (ref 98–107)
CHOLEST SERPL-MCNC: 210 MG/DL
CREAT SERPL-MCNC: 1.03 MG/DL (ref 0.67–1.17)
DEPRECATED HCO3 PLAS-SCNC: 27 MMOL/L (ref 22–29)
EGFRCR SERPLBLD CKD-EPI 2021: 81 ML/MIN/1.73M2
EOSINOPHIL # BLD AUTO: 0.2 10E3/UL (ref 0–0.7)
EOSINOPHIL NFR BLD AUTO: 4 %
ERYTHROCYTE [DISTWIDTH] IN BLOOD BY AUTOMATED COUNT: 12.3 % (ref 10–15)
GLUCOSE SERPL-MCNC: 103 MG/DL (ref 70–99)
HBA1C MFR BLD: 5.7 % (ref 0–5.6)
HCT VFR BLD AUTO: 46.8 % (ref 40–53)
HDLC SERPL-MCNC: 55 MG/DL
HGB BLD-MCNC: 16.1 G/DL (ref 13.3–17.7)
IMM GRANULOCYTES # BLD: 0 10E3/UL
IMM GRANULOCYTES NFR BLD: 0 %
LDLC SERPL CALC-MCNC: 143 MG/DL
LYMPHOCYTES # BLD AUTO: 1.2 10E3/UL (ref 0.8–5.3)
LYMPHOCYTES NFR BLD AUTO: 25 %
MCH RBC QN AUTO: 31.6 PG (ref 26.5–33)
MCHC RBC AUTO-ENTMCNC: 34.4 G/DL (ref 31.5–36.5)
MCV RBC AUTO: 92 FL (ref 78–100)
MONOCYTES # BLD AUTO: 0.6 10E3/UL (ref 0–1.3)
MONOCYTES NFR BLD AUTO: 12 %
NEUTROPHILS # BLD AUTO: 2.8 10E3/UL (ref 1.6–8.3)
NEUTROPHILS NFR BLD AUTO: 58 %
NONHDLC SERPL-MCNC: 155 MG/DL
PLATELET # BLD AUTO: 175 10E3/UL (ref 150–450)
POTASSIUM SERPL-SCNC: 4.6 MMOL/L (ref 3.4–5.3)
PROT SERPL-MCNC: 7 G/DL (ref 6.4–8.3)
RBC # BLD AUTO: 5.09 10E6/UL (ref 4.4–5.9)
SODIUM SERPL-SCNC: 139 MMOL/L (ref 135–145)
T4 FREE SERPL-MCNC: 1.14 NG/DL (ref 0.9–1.7)
TRIGL SERPL-MCNC: 60 MG/DL
TSH SERPL DL<=0.005 MIU/L-ACNC: 5.05 UIU/ML (ref 0.3–4.2)
WBC # BLD AUTO: 4.9 10E3/UL (ref 4–11)

## 2023-10-27 PROCEDURE — 36415 COLL VENOUS BLD VENIPUNCTURE: CPT

## 2023-10-27 PROCEDURE — 80061 LIPID PANEL: CPT

## 2023-10-27 PROCEDURE — 85025 COMPLETE CBC W/AUTO DIFF WBC: CPT

## 2023-10-27 PROCEDURE — 80053 COMPREHEN METABOLIC PANEL: CPT

## 2023-10-27 PROCEDURE — 83036 HEMOGLOBIN GLYCOSYLATED A1C: CPT

## 2023-10-27 PROCEDURE — 84443 ASSAY THYROID STIM HORMONE: CPT

## 2023-10-27 PROCEDURE — 84439 ASSAY OF FREE THYROXINE: CPT

## 2023-10-30 ENCOUNTER — OFFICE VISIT (OUTPATIENT)
Dept: FAMILY MEDICINE | Facility: CLINIC | Age: 63
End: 2023-10-30
Payer: COMMERCIAL

## 2023-10-30 VITALS
TEMPERATURE: 97.3 F | WEIGHT: 152.38 LBS | DIASTOLIC BLOOD PRESSURE: 85 MMHG | SYSTOLIC BLOOD PRESSURE: 131 MMHG | BODY MASS INDEX: 24.49 KG/M2 | RESPIRATION RATE: 16 BRPM | HEIGHT: 66 IN | OXYGEN SATURATION: 100 % | HEART RATE: 73 BPM

## 2023-10-30 DIAGNOSIS — Z00.00 ROUTINE GENERAL MEDICAL EXAMINATION AT A HEALTH CARE FACILITY: Primary | ICD-10-CM

## 2023-10-30 DIAGNOSIS — L71.9 ROSACEA: ICD-10-CM

## 2023-10-30 DIAGNOSIS — K21.9 GASTROESOPHAGEAL REFLUX DISEASE, UNSPECIFIED WHETHER ESOPHAGITIS PRESENT: ICD-10-CM

## 2023-10-30 DIAGNOSIS — J45.20 INTERMITTENT ASTHMA, UNCOMPLICATED: ICD-10-CM

## 2023-10-30 PROCEDURE — 99396 PREV VISIT EST AGE 40-64: CPT | Performed by: FAMILY MEDICINE

## 2023-10-30 RX ORDER — FAMOTIDINE 20 MG/1
20 TABLET, FILM COATED ORAL 2 TIMES DAILY
Qty: 180 TABLET | Refills: 3 | Status: SHIPPED | OUTPATIENT
Start: 2023-10-30

## 2023-10-30 ASSESSMENT — ENCOUNTER SYMPTOMS
ARTHRALGIAS: 1
SHORTNESS OF BREATH: 1
DIARRHEA: 0
NERVOUS/ANXIOUS: 0
DYSURIA: 0
MYALGIAS: 0
EYE PAIN: 0
HEADACHES: 1
HEARTBURN: 0
ABDOMINAL PAIN: 0
HEMATOCHEZIA: 0
PALPITATIONS: 0
CONSTIPATION: 0
PARESTHESIAS: 0
NAUSEA: 0
COUGH: 0
JOINT SWELLING: 0
WEAKNESS: 0
CHILLS: 0
SORE THROAT: 0
DIZZINESS: 0
FEVER: 0
HEMATURIA: 0
FREQUENCY: 0

## 2023-10-30 ASSESSMENT — PAIN SCALES - GENERAL: PAINLEVEL: NO PAIN (0)

## 2023-10-30 NOTE — PATIENT INSTRUCTIONS
Check to see if preop needed prior to eye procedure    Minimize saturated fats and avoid trans fats    Increase exercise     Overall labs good            Preventive Health Recommendations  Male Ages 50 - 64    Yearly exam:             See your health care provider every year in order to  o   Review health changes.   o   Discuss preventive care.    o   Review your medicines if your doctor has prescribed any.   Have a cholesterol test every 5 years, or more frequently if you are at risk for high cholesterol/heart disease.   Have a diabetes test (fasting glucose) every three years. If you are at risk for diabetes, you should have this test more often.   Have a colonoscopy at age 45, or have a yearly FIT test (stool test). These exams will check for colon cancer.    Talk with your health care provider about whether or not a prostate cancer screening test (PSA) is right for you.  You should be tested each year for STDs (sexually transmitted diseases), if you re at risk.     Shots: Get a flu shot each year. Get a tetanus shot every 10 years.     Nutrition:  Eat at least 5 servings of fruits and vegetables daily.   Eat whole-grain bread, whole-wheat pasta and brown rice instead of white grains and rice.   Get adequate Calcium and Vitamin D.     Lifestyle  Exercise for at least 150 minutes a week (30 minutes a day, 5 days a week). This will help you control your weight and prevent disease.   Limit alcohol to one drink per day.   No smoking.   Wear sunscreen to prevent skin cancer.   See your dentist every six months for an exam and cleaning.   See your eye doctor every 1 to 2 years.

## 2023-10-30 NOTE — PROGRESS NOTES
SUBJECTIVE:   CC: Jose is an 63 year old who presents for preventative health visit.     Healthy Habits:     Getting at least 3 servings of Calcium per day:  Yes    Bi-annual eye exam:  Yes    Dental care twice a year:  Yes    Sleep apnea or symptoms of sleep apnea:  None    Diet:  Regular (no restrictions)    Frequency of exercise:  None    Taking medications regularly:  Yes    Barriers to taking medications:  None    Medication side effects:  None    Additional concerns today:  Yes      Today's PHQ-2 Score:       10/30/2023     9:25 AM   PHQ-2 (  [employer])   Q1: Little interest or pleasure in doing things 0   Q2: Feeling down, depressed or hopeless 0   PHQ-2 Score 0   Q1: Little interest or pleasure in doing things Not at all   Q2: Feeling down, depressed or hopeless Not at all   PHQ-2 Score 0                       Social History     Tobacco Use    Smoking status: Former     Packs/day: 0.50     Years: 25.00     Additional pack years: 0.00     Total pack years: 12.50     Types: Cigarettes     Start date: 1980     Quit date: 2006     Years since quittin.8    Smokeless tobacco: Never    Tobacco comments:     1 pack a day   Substance Use Topics    Alcohol use: Not Currently     Comment: 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 Not Applicable          No data to display                Last PSA:   PSA   Date Value Ref Range Status   2020 0.50 0 - 4 ug/L Final     Comment:     Assay Method:  Chemiluminescence using Siemens Vista analyzer     Prostate Specific Antigen Screen   Date Value Ref Range Status   2022 0.57 0.00 - 4.00 ug/L Final       Reviewed orders with patient. Reviewed health maintenance and updated orders accordingly - No      Reviewed and updated as needed this visit by clinical staff   Tobacco  Allergies  Meds              Reviewed and updated as needed this visit by Provider      "                Review of Systems   Constitutional:  Negative for chills and fever.   HENT:  Positive for hearing loss. Negative for congestion, ear pain and sore throat.    Eyes:  Negative for pain and visual disturbance.   Respiratory:  Positive for shortness of breath. Negative for cough.    Cardiovascular:  Negative for chest pain, palpitations and peripheral edema.   Gastrointestinal:  Negative for abdominal pain, constipation, diarrhea, heartburn, hematochezia and nausea.   Genitourinary:  Negative for dysuria, frequency, genital sores, hematuria, impotence, penile discharge and urgency.   Musculoskeletal:  Positive for arthralgias. Negative for joint swelling and myalgias.   Skin:  Negative for rash.   Neurological:  Positive for headaches. Negative for dizziness, weakness and paresthesias.   Psychiatric/Behavioral:  Negative for mood changes. The patient is not nervous/anxious.      Had cyst on neck excised by general surgeon    No change in hearing    Had some dyspnea two weeks ago, caught cold    Headaches not bad    To have eye procedure    Kids healthy    No exercise    Not much walking recently     Stubbed big toe, occasional tingly    Leg not tingling anymore          OBJECTIVE:   /85 (BP Location: Right arm, Patient Position: Chair, Cuff Size: Adult Regular)   Pulse 73   Temp 97.3  F (36.3  C) (Temporal)   Resp 16   Ht 1.665 m (5' 5.57\")   Wt 69.1 kg (152 lb 6 oz)   SpO2 100%   BMI 24.92 kg/m      Physical Exam  GENERAL: healthy, alert and no distress  EYES: Eyes grossly normal to inspection, PERRL and conjunctivae and sclerae normal  HENT: ear canals and TM's normal, nose and mouth without ulcers or lesions  NECK: no adenopathy, no asymmetry, masses, or scars and thyroid normal to palpation  RESP: lungs clear to auscultation - no rales, rhonchi or wheezes  CV: regular rate and rhythm, normal S1 S2, no S3 or S4, no murmur, click or rub, no peripheral edema and peripheral pulses " "strong  ABDOMEN: soft, nontender, no hepatosplenomegaly, no masses and bowel sounds normal  MS: no gross musculoskeletal defects noted, no edema  SKIN: no suspicious lesions or rashes  NEURO: Normal strength and tone, mentation intact and speech normal  PSYCH: mentation appears normal, affect normal/bright  Well healing scar from cyst excision back of neck  Blackhead mid back     Diagnostic Test Results:  Labs reviewed in Epic    ASSESSMENT/PLAN:   Jose was seen today for physical.    Diagnoses and all orders for this visit:    Routine general medical examination at a health care facility    Intermittent asthma, uncomplicated  -     mometasone (ASMANEX, 30 METERED DOSES,) 220 MCG/ACT inhaler; USE 1 INHALATION DAILY    Gastroesophageal reflux disease, unspecified whether esophagitis present  -     famotidine (PEPCID) 20 MG tablet; Take 1 tablet (20 mg) by mouth 2 times daily    Rosacea  -     metroNIDAZOLE (METROCREAM) 0.75 % external cream; Apply topically 2 times daily as needed Patient to call when refill needed    Other orders  -     PRIMARY CARE FOLLOW-UP SCHEDULING; Future    Went over recent labs in great detail  Refill needed meds  Keep working on healthy diet/exercise   He had CT chest in 2019 which showed no coronary calcifications; hold off on statin for now  He will check if preop needed before the eye yag procedure    Patient has been advised of split billing requirements and indicates understanding: Yes      COUNSELING:   Reviewed preventive health counseling, as reflected in patient instructions       Regular exercise       Healthy diet/nutrition       Vision screening       Safe sex practices/STD prevention       Colorectal cancer screening       Prostate cancer screening      BMI:   Estimated body mass index is 24.92 kg/m  as calculated from the following:    Height as of this encounter: 1.665 m (5' 5.57\").    Weight as of this encounter: 69.1 kg (152 lb 6 oz).         He reports that he quit " smoking about 17 years ago. His smoking use included cigarettes. He started smoking about 43 years ago. He has a 12.50 pack-year smoking history. He has never used smokeless tobacco.            Nicolás Morejon MD  [facility]

## 2024-06-24 ENCOUNTER — MYC MEDICAL ADVICE (OUTPATIENT)
Dept: FAMILY MEDICINE | Facility: CLINIC | Age: 64
End: 2024-06-24
Payer: COMMERCIAL

## 2024-06-24 DIAGNOSIS — J45.30 MILD PERSISTENT ASTHMA WITHOUT COMPLICATION: Primary | ICD-10-CM

## 2024-06-24 PROBLEM — M65.312 TRIGGER THUMB OF LEFT HAND: Status: RESOLVED | Noted: 2019-12-17 | Resolved: 2024-06-24

## 2024-06-24 RX ORDER — FLUTICASONE PROPIONATE 220 UG/1
1 AEROSOL, METERED RESPIRATORY (INHALATION) 2 TIMES DAILY
Qty: 36 G | Refills: 1 | Status: SHIPPED | OUTPATIENT
Start: 2024-06-24

## 2024-11-27 SDOH — HEALTH STABILITY: PHYSICAL HEALTH: ON AVERAGE, HOW MANY DAYS PER WEEK DO YOU ENGAGE IN MODERATE TO STRENUOUS EXERCISE (LIKE A BRISK WALK)?: 3 DAYS

## 2024-11-27 SDOH — HEALTH STABILITY: PHYSICAL HEALTH: ON AVERAGE, HOW MANY MINUTES DO YOU ENGAGE IN EXERCISE AT THIS LEVEL?: 40 MIN

## 2024-11-27 ASSESSMENT — SOCIAL DETERMINANTS OF HEALTH (SDOH): HOW OFTEN DO YOU GET TOGETHER WITH FRIENDS OR RELATIVES?: TWICE A WEEK

## 2024-11-27 ASSESSMENT — ASTHMA QUESTIONNAIRES
QUESTION_1 LAST FOUR WEEKS HOW MUCH OF THE TIME DID YOUR ASTHMA KEEP YOU FROM GETTING AS MUCH DONE AT WORK, SCHOOL OR AT HOME: NONE OF THE TIME
QUESTION_5 LAST FOUR WEEKS HOW WOULD YOU RATE YOUR ASTHMA CONTROL: WELL CONTROLLED
ACT_TOTALSCORE: 24
ACT_TOTALSCORE: 24
QUESTION_3 LAST FOUR WEEKS HOW OFTEN DID YOUR ASTHMA SYMPTOMS (WHEEZING, COUGHING, SHORTNESS OF BREATH, CHEST TIGHTNESS OR PAIN) WAKE YOU UP AT NIGHT OR EARLIER THAN USUAL IN THE MORNING: NOT AT ALL
QUESTION_4 LAST FOUR WEEKS HOW OFTEN HAVE YOU USED YOUR RESCUE INHALER OR NEBULIZER MEDICATION (SUCH AS ALBUTEROL): NOT AT ALL
QUESTION_2 LAST FOUR WEEKS HOW OFTEN HAVE YOU HAD SHORTNESS OF BREATH: NOT AT ALL

## 2024-11-30 ENCOUNTER — LAB (OUTPATIENT)
Dept: LAB | Facility: CLINIC | Age: 64
End: 2024-11-30
Payer: COMMERCIAL

## 2024-11-30 DIAGNOSIS — R53.83 FATIGUE, UNSPECIFIED TYPE: ICD-10-CM

## 2024-11-30 DIAGNOSIS — Z12.5 SCREENING FOR PROSTATE CANCER: ICD-10-CM

## 2024-11-30 DIAGNOSIS — E78.5 HYPERLIPIDEMIA LDL GOAL <100: ICD-10-CM

## 2024-11-30 DIAGNOSIS — R73.01 IMPAIRED FASTING GLUCOSE: ICD-10-CM

## 2024-11-30 LAB
ALBUMIN SERPL BCG-MCNC: 4.3 G/DL (ref 3.5–5.2)
ALP SERPL-CCNC: 70 U/L (ref 40–150)
ALT SERPL W P-5'-P-CCNC: 16 U/L (ref 0–70)
ANION GAP SERPL CALCULATED.3IONS-SCNC: 8 MMOL/L (ref 7–15)
AST SERPL W P-5'-P-CCNC: 20 U/L (ref 0–45)
BASOPHILS # BLD AUTO: 0 10E3/UL (ref 0–0.2)
BASOPHILS NFR BLD AUTO: 0 %
BILIRUB SERPL-MCNC: 0.6 MG/DL
BUN SERPL-MCNC: 13.2 MG/DL (ref 8–23)
CALCIUM SERPL-MCNC: 9.5 MG/DL (ref 8.8–10.4)
CHLORIDE SERPL-SCNC: 103 MMOL/L (ref 98–107)
CHOLEST SERPL-MCNC: 213 MG/DL
CREAT SERPL-MCNC: 1.03 MG/DL (ref 0.67–1.17)
EGFRCR SERPLBLD CKD-EPI 2021: 81 ML/MIN/1.73M2
EOSINOPHIL # BLD AUTO: 0.1 10E3/UL (ref 0–0.7)
EOSINOPHIL NFR BLD AUTO: 2 %
ERYTHROCYTE [DISTWIDTH] IN BLOOD BY AUTOMATED COUNT: 12.4 % (ref 10–15)
EST. AVERAGE GLUCOSE BLD GHB EST-MCNC: 117 MG/DL
FASTING STATUS PATIENT QL REPORTED: YES
FASTING STATUS PATIENT QL REPORTED: YES
GLUCOSE SERPL-MCNC: 102 MG/DL (ref 70–99)
HBA1C MFR BLD: 5.7 % (ref 0–5.6)
HCO3 SERPL-SCNC: 28 MMOL/L (ref 22–29)
HCT VFR BLD AUTO: 44.2 % (ref 40–53)
HDLC SERPL-MCNC: 54 MG/DL
HGB BLD-MCNC: 15 G/DL (ref 13.3–17.7)
IMM GRANULOCYTES # BLD: 0 10E3/UL
IMM GRANULOCYTES NFR BLD: 0 %
LDLC SERPL CALC-MCNC: 145 MG/DL
LYMPHOCYTES # BLD AUTO: 1.5 10E3/UL (ref 0.8–5.3)
LYMPHOCYTES NFR BLD AUTO: 26 %
MCH RBC QN AUTO: 31.4 PG (ref 26.5–33)
MCHC RBC AUTO-ENTMCNC: 33.9 G/DL (ref 31.5–36.5)
MCV RBC AUTO: 93 FL (ref 78–100)
MONOCYTES # BLD AUTO: 0.5 10E3/UL (ref 0–1.3)
MONOCYTES NFR BLD AUTO: 9 %
NEUTROPHILS # BLD AUTO: 3.6 10E3/UL (ref 1.6–8.3)
NEUTROPHILS NFR BLD AUTO: 62 %
NONHDLC SERPL-MCNC: 159 MG/DL
PLATELET # BLD AUTO: 187 10E3/UL (ref 150–450)
POTASSIUM SERPL-SCNC: 4.2 MMOL/L (ref 3.4–5.3)
PROT SERPL-MCNC: 6.7 G/DL (ref 6.4–8.3)
PSA SERPL DL<=0.01 NG/ML-MCNC: 0.59 NG/ML (ref 0–4.5)
RBC # BLD AUTO: 4.77 10E6/UL (ref 4.4–5.9)
SODIUM SERPL-SCNC: 139 MMOL/L (ref 135–145)
T4 FREE SERPL-MCNC: 1 NG/DL (ref 0.9–1.7)
TRIGL SERPL-MCNC: 71 MG/DL
TSH SERPL DL<=0.005 MIU/L-ACNC: 4.83 UIU/ML (ref 0.3–4.2)
WBC # BLD AUTO: 5.8 10E3/UL (ref 4–11)

## 2024-11-30 PROCEDURE — 84439 ASSAY OF FREE THYROXINE: CPT

## 2024-11-30 PROCEDURE — 83036 HEMOGLOBIN GLYCOSYLATED A1C: CPT

## 2024-11-30 PROCEDURE — 80053 COMPREHEN METABOLIC PANEL: CPT

## 2024-11-30 PROCEDURE — G0103 PSA SCREENING: HCPCS

## 2024-11-30 PROCEDURE — 85025 COMPLETE CBC W/AUTO DIFF WBC: CPT

## 2024-11-30 PROCEDURE — 36415 COLL VENOUS BLD VENIPUNCTURE: CPT

## 2024-11-30 PROCEDURE — 80061 LIPID PANEL: CPT

## 2024-11-30 PROCEDURE — 84443 ASSAY THYROID STIM HORMONE: CPT

## 2024-12-03 ENCOUNTER — OFFICE VISIT (OUTPATIENT)
Dept: FAMILY MEDICINE | Facility: CLINIC | Age: 64
End: 2024-12-03
Attending: FAMILY MEDICINE
Payer: COMMERCIAL

## 2024-12-03 VITALS
RESPIRATION RATE: 14 BRPM | BODY MASS INDEX: 23.7 KG/M2 | SYSTOLIC BLOOD PRESSURE: 129 MMHG | TEMPERATURE: 97.3 F | HEART RATE: 77 BPM | DIASTOLIC BLOOD PRESSURE: 77 MMHG | OXYGEN SATURATION: 99 % | WEIGHT: 151 LBS | HEIGHT: 67 IN

## 2024-12-03 DIAGNOSIS — J45.20 INTERMITTENT ASTHMA, UNCOMPLICATED: ICD-10-CM

## 2024-12-03 DIAGNOSIS — K21.9 GASTROESOPHAGEAL REFLUX DISEASE, UNSPECIFIED WHETHER ESOPHAGITIS PRESENT: ICD-10-CM

## 2024-12-03 DIAGNOSIS — H10.13 ALLERGIC CONJUNCTIVITIS, BILATERAL: ICD-10-CM

## 2024-12-03 DIAGNOSIS — E78.5 HYPERLIPIDEMIA LDL GOAL <100: ICD-10-CM

## 2024-12-03 DIAGNOSIS — R73.01 IMPAIRED FASTING GLUCOSE: ICD-10-CM

## 2024-12-03 DIAGNOSIS — Z00.00 ENCOUNTER FOR MEDICARE ANNUAL WELLNESS EXAM: Primary | ICD-10-CM

## 2024-12-03 DIAGNOSIS — E03.8 SUBCLINICAL HYPOTHYROIDISM: ICD-10-CM

## 2024-12-03 DIAGNOSIS — L98.9 SKIN LESIONS: ICD-10-CM

## 2024-12-03 PROCEDURE — G0402 INITIAL PREVENTIVE EXAM: HCPCS | Performed by: FAMILY MEDICINE

## 2024-12-03 PROCEDURE — 99213 OFFICE O/P EST LOW 20 MIN: CPT | Mod: 25 | Performed by: FAMILY MEDICINE

## 2024-12-03 RX ORDER — ALBUTEROL SULFATE 90 UG/1
2 INHALANT RESPIRATORY (INHALATION) 4 TIMES DAILY PRN
Qty: 18 G | Refills: 5 | Status: SHIPPED | OUTPATIENT
Start: 2024-12-03

## 2024-12-03 RX ORDER — KETOTIFEN FUMARATE 0.35 MG/ML
1 SOLUTION/ DROPS OPHTHALMIC 2 TIMES DAILY
Qty: 10 ML | Refills: 11 | Status: SHIPPED | OUTPATIENT
Start: 2024-12-03

## 2024-12-03 ASSESSMENT — PAIN SCALES - GENERAL: PAINLEVEL_OUTOF10: NO PAIN (0)

## 2024-12-03 NOTE — PATIENT INSTRUCTIONS
Get flu and covid shots at pharmacy    Could later shingles shot if desired at pharmacy    See if QVAR covered instead of flovent     Trial of ketotifen eye drop                 Patient Education   Preventive Care Advice   This is general advice given by our system to help you stay healthy. However, your care team may have specific advice just for you. Please talk to your care team about your preventive care needs.  Nutrition  Eat 5 or more servings of fruits and vegetables each day.  Try wheat bread, brown rice and whole grain pasta (instead of white bread, rice, and pasta).  Get enough calcium and vitamin D. Check the label on foods and aim for 100% of the RDA (recommended daily allowance).  Lifestyle  Exercise at least 150 minutes each week  (30 minutes a day, 5 days a week).  Do muscle strengthening activities 2 days a week. These help control your weight and prevent disease.  No smoking.  Wear sunscreen to prevent skin cancer.  Have a dental exam and cleaning every 6 months.  Yearly exams  See your health care team every year to talk about:  Any changes in your health.  Any medicines your care team has prescribed.  Preventive care, family planning, and ways to prevent chronic diseases.  Shots (vaccines)   HPV shots (up to age 26), if you've never had them before.  Hepatitis B shots (up to age 59), if you've never had them before.  COVID-19 shot: Get this shot when it's due.  Flu shot: Get a flu shot every year.  Tetanus shot: Get a tetanus shot every 10 years.  Pneumococcal, hepatitis A, and RSV shots: Ask your care team if you need these based on your risk.  Shingles shot (for age 50 and up)  General health tests  Diabetes screening:  Starting at age 35, Get screened for diabetes at least every 3 years.  If you are younger than age 35, ask your care team if you should be screened for diabetes.  Cholesterol test: At age 39, start having a cholesterol test every 5 years, or more often if advised.  Bone density  scan (DEXA): At age 50, ask your care team if you should have this scan for osteoporosis (brittle bones).  Hepatitis C: Get tested at least once in your life.  STIs (sexually transmitted infections)  Before age 24: Ask your care team if you should be screened for STIs.  After age 24: Get screened for STIs if you're at risk. You are at risk for STIs (including HIV) if:  You are sexually active with more than one person.  You don't use condoms every time.  You or a partner was diagnosed with a sexually transmitted infection.  If you are at risk for HIV, ask about PrEP medicine to prevent HIV.  Get tested for HIV at least once in your life, whether you are at risk for HIV or not.  Cancer screening tests  Cervical cancer screening: If you have a cervix, begin getting regular cervical cancer screening tests starting at age 21.  Breast cancer scan (mammogram): If you've ever had breasts, begin having regular mammograms starting at age 40. This is a scan to check for breast cancer.  Colon cancer screening: It is important to start screening for colon cancer at age 45.  Have a colonoscopy test every 10 years (or more often if you're at risk) Or, ask your provider about stool tests like a FIT test every year or Cologuard test every 3 years.  To learn more about your testing options, visit:   .  For help making a decision, visit:   https://bit.ly/mj73864.  Prostate cancer screening test: If you have a prostate, ask your care team if a prostate cancer screening test (PSA) at age 55 is right for you.  Lung cancer screening: If you are a current or former smoker ages 50 to 80, ask your care team if ongoing lung cancer screenings are right for you.  For informational purposes only. Not to replace the advice of your health care provider. Copyright   2023 [employer]. All rights reserved. Clinically reviewed by the  [employer] [place] [facility]. [employer] 143806 - REV 01/24.  Hearing Loss: Care  Instructions  Overview     Hearing loss is a sudden or slow decrease in how well you hear. It can range from slight to profound. Permanent hearing loss can occur with aging. It also can happen when you are exposed long-term to loud noise. Examples include listening to loud music, riding motorcycles, or being around other loud machines.  Hearing loss can affect your work and home life. It can make you feel lonely or depressed. You may feel that you have lost your independence. But hearing aids and other devices can help you hear better and feel connected to others.  Follow-up care is a key part of your treatment and safety. Be sure to make and go to all appointments, and call your doctor if you are having problems. It's also a good idea to know your test results and keep a list of the medicines you take.  How can you care for yourself at home?  Avoid loud noises whenever possible. This helps keep your hearing from getting worse.  Always wear hearing protection around loud noises.  Wear a hearing aid as directed.  A professional can help you pick a hearing aid that will work best for you.  You can also get hearing aids over the counter for mild to moderate hearing loss.  Have hearing tests as your doctor suggests. They can show whether your hearing has changed. Your hearing aid may need to be adjusted.  Use other devices as needed. These may include:  Telephone amplifiers and hearing aids that can connect to a television, stereo, radio, or microphone.  Devices that use lights or vibrations. These alert you to the doorbell, a ringing telephone, or a baby monitor.  Television closed-captioning. This shows the words at the bottom of the screen. Most new TVs can do this.  TTY (text telephone). This lets you type messages back and forth on the telephone instead of talking or listening. These devices are also called TDD. When messages are typed on the keyboard, they are sent over the phone line to a receiving TTY. The  "message is shown on a monitor.  Use text messaging, social media, and email if it is hard for you to communicate by telephone.  Try to learn a listening technique called speechreading. It is not lipreading. You pay attention to people's gestures, expressions, posture, and tone of voice. These clues can help you understand what a person is saying. Face the person you are talking to, and have them face you. Make sure the lighting is good. You need to see the other person's face clearly.  Think about counseling if you need help to adjust to your hearing loss.  When should you call for help?  Watch closely for changes in your health, and be sure to contact your doctor if:    You think your hearing is getting worse.     You have new symptoms, such as dizziness or nausea.   Where can you learn more?  Go to https://www.[employer].net/patiented  Enter R798 in the search box to learn more about \"Hearing Loss: Care Instructions.\"  Current as of: September 27, 2023  Content Version: 14.2 2024 [facility] [employer].   Care instructions adapted under license by your healthcare professional. If you have questions about a medical condition or this instruction, always ask your healthcare professional. [employer] disclaims any warranty or liability for your use of this information.       "

## 2024-12-03 NOTE — PROGRESS NOTES
Preventive Care Visit  [facility], Family Medicine  Dec 3, 2024          Letty Garcia is a 64 year old, presenting for the following:  Wellness Visit (Non-fasting. He already had his labs drawn)        12/3/2024    10:23 AM   Additional Questions   Roomed by Hemalatha WANG  Feeling good    Wondered about trigger finger; some pain but not catching     Eyes water a lot    Uses artificial tears some    Not needing the albuterol recently  Only rarely    One daily on flovent  ? Insurance change     Health Care Directive  Patient does not have a Health Care Directive: Patient states has Advance Directive and will bring in a copy to clinic.      11/27/2024   General Health   How would you rate your overall physical health? Good            11/27/2024   Nutrition   Diet: Regular (no restrictions)            11/27/2024   Exercise   Days per week of moderate/strenous exercise 3 days   Average minutes spent exercising at this level 40 min            11/27/2024   Social Factors   Frequency of gathering with friends or relatives Twice a week   Worry food won't last until get money to buy more No   Food not last or not have enough money for food? No   Do you have housing? (Housing is defined as stable permanent housing and does not include staying ouside in a car, in a tent, in an abandoned building, in an overnight shelter, or couch-surfing.) Yes   Are you worried about losing your housing? No   Lack of transportation? No   Unable to get utilities (heat,electricity)? No            11/27/2024   Fall Risk   Fallen 2 or more times in the past year? No     No    Trouble with walking or balance? No     No        Patient-reported    Multiple values from one day are sorted in reverse-chronological order          11/27/2024   Activities of Daily Living- Home Safety   Needs help with the following daily activites None of the above   Safety concerns in the home None of the  above            2024   Dental   Dentist two times every year? Yes            2024   Hearing Screening   Hearing concerns? (!) I NEED TO ASK PEOPLE TO SPEAK UP OR REPEAT THEMSELVES.    (!) IT'S HARDER TO UNDERSTAND WOMEN'S VOICES THAN MEN'S VOICES.    (!) IT'S HARD TO FOLLOW A CONVERSATION IN A NOISY RESTAURANT OR CROWDED ROOM.    (!) TROUBLE UNDERSTANDING SOFT OR WHISPERED SPEECH.       Multiple values from one day are sorted in reverse-chronological order         2024   Driving Risk Screening   Patient/family members have concerns about driving No            2024   General Alertness/Fatigue Screening   Have you been more tired than usual lately? No            2024   Urinary Incontinence Screening   Bothered by leaking urine in past 6 months No            2024   TB Screening   Were you born outside of the [place]? No            Today's PHQ-2 Score:       12/3/2024    10:09 AM   PHQ-2 (  [employer])   Q1: Little interest or pleasure in doing things 0    Q2: Feeling down, depressed or hopeless 0    PHQ-2 Score 0    Q1: Little interest or pleasure in doing things Not at all   Q2: Feeling down, depressed or hopeless Not at all   PHQ-2 Score 0       Patient-reported           2024   Substance Use   Alcohol more than 3/day or more than 7/wk Not Applicable   Do you have a current opioid prescription? No   How severe/bad is pain from 1 to 10? 0/10 (No Pain)   Do you use any other substances recreationally? 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 No      Last PSA:   PSA   Date Value Ref Range Status   11/11/2020 0.50 0 - 4 ug/L Final     Comment:     Assay Method:  Chemiluminescence using Siemens Vista analyzer     Prostate Specific Antigen Screen   Date Value Ref Range Status   11/30/2024 0.59 0.00 - 4.50 ng/mL Final   11/11/2022 0.57 0.00 - 4.00 ug/L Final     ASCVD Risk   The 10-year ASCVD risk score (Jarocho HAHN, et al., 2019) is: 12.9%    Values used to calculate the score:      Age: 64 years      Sex: Male      Is Non- : No      Diabetic: No      Tobacco smoker: No      Systolic Blood Pressure: 129 mmHg      Is BP treated: No      HDL Cholesterol: 54 mg/dL      Total Cholesterol: 213 mg/dL            Reviewed and updated as needed this visit by Provider                      Current providers sharing in care for this patient include:  Patient Care Team:  Nicolás Morejon MD as PCP - General (Family Practice)  Janes Romo DO (Oncology)  Jayashree Hopper, RN as Continuity Care Coordinator (Nurse)  Brandon Frausto MD as MD (Dermatology)  Nicolás Morejon MD as Assigned PCP    The following health maintenance items are reviewed in Epic and correct as of today:  Health Maintenance   Topic Date Due    HIV SCREENING  Never done    ZOSTER IMMUNIZATION (2 of 3) 12/12/2012    RSV VACCINE (1 - Risk 60-74 years 1-dose series) Never done    ANNUAL REVIEW OF HM ORDERS  09/27/2022    ASTHMA ACTION PLAN  11/14/2023    INFLUENZA VACCINE (1) 09/01/2024    COVID-19 Vaccine (3 - 2024-25 season) 09/01/2024    MEDICARE ANNUAL WELLNESS VISIT  10/30/2024    Pneumococcal Vaccine: 65+ Years (3 of 3 - PPSV23 or PCV20) 06/24/2024    ASTHMA CONTROL TEST  06/03/2025    FALL RISK ASSESSMENT  12/03/2025    GLUCOSE  11/30/2027    DTAP/TDAP/TD IMMUNIZATION (3 - Td or  "Tdap) 10/12/2028    ADVANCE CARE PLANNING  06/24/2029    LIPID  11/30/2029    COLORECTAL CANCER SCREENING  05/19/2031    HEPATITIS C SCREENING  Completed    PHQ-2 (once per calendar year)  Completed    AORTIC ANEURYSM SCREENING (SYSTEM ASSIGNED)  Completed    HPV IMMUNIZATION  Aged Out    MENINGITIS IMMUNIZATION  Aged Out    RSV MONOCLONAL ANTIBODY  Aged Out            Objective    Exam  /77 (BP Location: Right arm, Patient Position: Chair, Cuff Size: Adult Regular)   Pulse 77   Temp 97.3  F (36.3  C) (Temporal)   Resp 14   Ht 1.69 m (5' 6.54\")   Wt 68.5 kg (151 lb)   SpO2 99%   BMI 23.98 kg/m     Estimated body mass index is 23.98 kg/m  as calculated from the following:    Height as of this encounter: 1.69 m (5' 6.54\").    Weight as of this encounter: 68.5 kg (151 lb).    Physical Exam  GENERAL: alert and no distress  EYES: Eyes grossly normal to inspection, PERRL and conjunctivae and sclerae normal  HENT: ear canals and TM's normal, nose and mouth without ulcers or lesions  NECK: no adenopathy, no asymmetry, masses, or scars  RESP: lungs clear to auscultation - no rales, rhonchi or wheezes  CV: regular rate and rhythm, normal S1 S2, no S3 or S4, no murmur, click or rub, no peripheral edema  ABDOMEN: soft, nontender, no hepatosplenomegaly, no masses and bowel sounds normal  MS: no gross musculoskeletal defects noted, no edema  SKIN: possible actinic keratosis forehead area  NEURO: Normal strength and tone, mentation intact and speech normal  PSYCH: mentation appears normal, affect normal/bright        12/3/2024   Mini Cog   Clock Draw Score 2 Normal   3 Item Recall 3 objects recalled   Mini Cog Total Score 5            Vision Screen  Reason Vision Screen Not Completed: Screening Recommend: Patient/Guardian Declined (patient had eye exam last [facility])           1. Encounter for Medicare annual wellness exam    2. Allergic conjunctivitis, bilateral    3. Intermittent asthma, " uncomplicated    4. Skin lesions    5. Gastroesophageal reflux disease, unspecified whether esophagitis present    6. Impaired fasting glucose    7. Hyperlipidemia LDL goal <100    8. Subclinical hypothyroidism      Went over multiple issues with patient  Went over recent labs in detail and gave patient paper copy  Discussed shots to get at pharmacy  Keep working on healthy diet/exercise   Patient to schedule with dermatology for skin check   Change from flovent to qvar for insurance reasons  Trial of ketotifen eye drop              Signed Electronically by: Nicolás Morejon MD

## 2024-12-17 ENCOUNTER — APPOINTMENT (OUTPATIENT)
Dept: URBAN - METROPOLITAN AREA CLINIC 252 | Age: 64
Setting detail: DERMATOLOGY
End: 2024-12-18

## 2024-12-17 VITALS — HEIGHT: 66 IN | WEIGHT: 151 LBS

## 2024-12-17 DIAGNOSIS — L71.8 OTHER ROSACEA: ICD-10-CM

## 2024-12-17 DIAGNOSIS — L72.0 EPIDERMAL CYST: ICD-10-CM

## 2024-12-17 DIAGNOSIS — L82.0 INFLAMED SEBORRHEIC KERATOSIS: ICD-10-CM

## 2024-12-17 PROCEDURE — OTHER LIQUID NITROGEN: OTHER

## 2024-12-17 PROCEDURE — 99213 OFFICE O/P EST LOW 20 MIN: CPT | Mod: 25

## 2024-12-17 PROCEDURE — 17110 DESTRUCT B9 LESION 1-14: CPT

## 2024-12-17 PROCEDURE — OTHER MIPS QUALITY: OTHER

## 2024-12-17 PROCEDURE — OTHER PATIENT SPECIFIC COUNSELING: OTHER

## 2024-12-17 PROCEDURE — OTHER COUNSELING: OTHER

## 2024-12-17 PROCEDURE — OTHER ADDITIONAL NOTES: OTHER

## 2024-12-17 ASSESSMENT — LOCATION ZONE DERM
LOCATION ZONE: FACE
LOCATION ZONE: NOSE

## 2024-12-17 ASSESSMENT — LOCATION SIMPLE DESCRIPTION DERM
LOCATION SIMPLE: RIGHT TEMPLE
LOCATION SIMPLE: RIGHT CHEEK
LOCATION SIMPLE: SUPERIOR FOREHEAD
LOCATION SIMPLE: LEFT CHEEK
LOCATION SIMPLE: NOSE

## 2024-12-17 ASSESSMENT — LOCATION DETAILED DESCRIPTION DERM
LOCATION DETAILED: RIGHT CENTRAL MALAR CHEEK
LOCATION DETAILED: NASAL SUPRATIP
LOCATION DETAILED: SUPERIOR MID FOREHEAD
LOCATION DETAILED: LEFT CENTRAL MALAR CHEEK
LOCATION DETAILED: RIGHT MEDIAL TEMPLE

## 2024-12-17 NOTE — PROCEDURE: PATIENT SPECIFIC COUNSELING
Detail Level: Zone
-Patient declined refills today & will plan to send metronidazole 0.75% topical cream when patient requests.

## 2024-12-17 NOTE — HPI: RASH (ROSACEA)
How Severe Is Your Rosacea?: moderate
Is This A New Presentation, Or A Follow-Up?: Follow Up Rosacea
Additional History: Was using metronidazole 0.75%. This works well but no refill needed.

## 2024-12-17 NOTE — PROCEDURE: ADDITIONAL NOTES
Render Risk Assessment In Note?: no
Detail Level: Simple
Additional Notes: -Informed patient that lesion is benign and appears as an SK. \\n-Discussed treatment options to freeze with LN2. Pros and cons discussed. \\n-Patient opted to proceed with treatment.

## 2024-12-17 NOTE — PROCEDURE: LIQUID NITROGEN
Render Post-Care Instructions In Note?: yes
Detail Level: Detailed
Consent: - Consent was obtained and risks were reviewed prior to procedure today. All questions were answered prior to procedure today.\\n- Risks discussed include but are not limited to pain, crusting, scabbing, blistering, scarring, temporary or permanent darker or lighter pigmentary change, recurrence, incomplete resolution, and infection.
Application Tool (Optional): Liquid Nitrogen Sprayer
Medical Necessity Information: It is in your best interest to select a reason for this procedure from the list below. All of these items fulfill various CMS LCD requirements except the new and changing color options.
Post-Care Instructions: - Avoid picking at any of the treated lesions.\\n- Blisters should not be popped. However should a blister rupture, cover it with Vaseline ointment or Aquaphor and a bandage until healed.
Medical Necessity Clause: This procedure was medically necessary because the lesions that were treated were:
Add 52 Modifier (Optional): no
Spray Paint Text: The liquid nitrogen was applied to the skin utilizing a spray paint frosting technique.

## 2024-12-17 NOTE — HPI: SKIN LESION
Is This A New Presentation, Or A Follow-Up?: Skin Lesion
Additional History: Gets inflamed at times as well. Symptoms wax and wanes. It is usually not causing symptoms.

## 2025-01-01 DIAGNOSIS — K21.9 GASTROESOPHAGEAL REFLUX DISEASE, UNSPECIFIED WHETHER ESOPHAGITIS PRESENT: ICD-10-CM

## 2025-01-02 RX ORDER — FAMOTIDINE 20 MG/1
20 TABLET, FILM COATED ORAL 2 TIMES DAILY
Qty: 180 TABLET | Refills: 3 | Status: SHIPPED | OUTPATIENT
Start: 2025-01-02

## 2025-06-05 ENCOUNTER — MYC REFILL (OUTPATIENT)
Dept: FAMILY MEDICINE | Facility: CLINIC | Age: 65
End: 2025-06-05
Payer: COMMERCIAL

## 2025-06-05 DIAGNOSIS — L71.9 ROSACEA: ICD-10-CM

## 2025-07-09 NOTE — PROCEDURE: COUNSELING
Detail Level: Simple
Yes
Patient Specific Counseling (Will Not Stick From Patient To Patient): Continue metronidazole 0.75 % bid for maintenance or as needed as he reports as needed works well for him.
Patient Specific Counseling (Will Not Stick From Patient To Patient): - Patient to discontinue use of ketoconazole. \\n- Discussed restarting use of triamcinolone vs start of a non-steroidal topical.\\n- Patient to continue use of Eucerin Advanced Repair for maintenance.\\n- Discussed treatment option of steroid injection.\\n- Will consider skin biopsy if not 100% itch-free in 1 month.

## (undated) DEVICE — GLOVE PROTEXIS W/NEU-THERA 8.5  2D73TE85

## (undated) DEVICE — BLADE KNIFE SURG 11 371111

## (undated) DEVICE — GLOVE PROTEXIS W/NEU-THERA 8.0  2D73TE80

## (undated) DEVICE — ESU GROUND PAD ADULT W/CORD E7507

## (undated) DEVICE — BNDG KLING 2" 2231

## (undated) DEVICE — SOL WATER IRRIG 1000ML BOTTLE 07139-09

## (undated) DEVICE — GLOVE PROTEXIS W/NEU-THERA 7.5  2D73TE75

## (undated) DEVICE — DRAPE STERI TOWEL SM 1000

## (undated) DEVICE — BLADE KNIFE SURG 15 371115

## (undated) DEVICE — PREP CHLORAPREP 26ML TINTED ORANGE  260815

## (undated) DEVICE — ESU ELEC NDL 1" COATED/INSULATED E1465

## (undated) DEVICE — PACK HAND WRIST SOP15HWFSP

## (undated) DEVICE — NDL 19GA 1.5"

## (undated) DEVICE — SU ETHILON 5-0 P-3 18" BLACK 698G

## (undated) DEVICE — GLOVE PROTEXIS POWDER FREE 8.5 ORTHOPEDIC 2D73ET85